# Patient Record
Sex: MALE | Race: WHITE | NOT HISPANIC OR LATINO | Employment: OTHER | URBAN - NONMETROPOLITAN AREA
[De-identification: names, ages, dates, MRNs, and addresses within clinical notes are randomized per-mention and may not be internally consistent; named-entity substitution may affect disease eponyms.]

---

## 2018-04-21 ENCOUNTER — HOSPITAL ENCOUNTER (EMERGENCY)
Facility: HOSPITAL | Age: 48
Discharge: HOME | End: 2018-04-21
Attending: EMERGENCY MEDICINE
Payer: COMMERCIAL

## 2018-04-21 VITALS
BODY MASS INDEX: 22.07 KG/M2 | DIASTOLIC BLOOD PRESSURE: 88 MMHG | RESPIRATION RATE: 18 BRPM | WEIGHT: 162.7 LBS | OXYGEN SATURATION: 100 % | TEMPERATURE: 97.5 F | SYSTOLIC BLOOD PRESSURE: 150 MMHG | HEART RATE: 81 BPM

## 2018-04-21 DIAGNOSIS — S61.219A FINGER LACERATION INVOLVING TENDON: Primary | ICD-10-CM

## 2018-04-21 PROCEDURE — 90471 IMMUNIZATION ADMIN: CPT

## 2018-04-21 PROCEDURE — 99283 EMERGENCY DEPT VISIT LOW MDM: CPT

## 2018-04-21 PROCEDURE — 90715 TDAP VACCINE 7 YRS/> IM: CPT | Performed by: EMERGENCY MEDICINE

## 2018-04-21 RX ORDER — CEPHALEXIN 250 MG/1
500 CAPSULE ORAL ONCE
Status: COMPLETED | OUTPATIENT
Start: 2018-04-21 | End: 2018-04-21

## 2018-04-21 RX ORDER — CEPHALEXIN 500 MG/1
500 CAPSULE ORAL EVERY 6 HOURS SCHEDULED
Qty: 40 CAPSULE | Refills: 0 | Status: SHIPPED | OUTPATIENT
Start: 2018-04-21 | End: 2018-05-01

## 2018-04-21 RX ADMIN — TETANUS TOXOID, REDUCED DIPHTHERIA TOXOID AND ACELLULAR PERTUSSIS VACCINE, ADSORBED 0.5 ML: 5; 2.5; 8; 8; 2.5 SUSPENSION INTRAMUSCULAR at 07:19

## 2018-04-21 RX ADMIN — CEPHALEXIN 500 MG: 250 CAPSULE ORAL at 07:19

## 2018-04-21 NOTE — DISCHARGE INSTRUCTIONS
Finger Laceration   WHAT YOU NEED TO KNOW:   A finger laceration is a deep cut in your skin  It is often caused by a sharp object, such as a knife, or blunt force to your finger  Your blood vessels, bones, joints, tendons, or nerves may also be injured  DISCHARGE INSTRUCTIONS:   Return to the emergency department if:   · Your wound comes apart  · Blood soaks through your bandage  · You have severe pain in your finger or hand  · Your finger is pale and cold  · You have sudden trouble moving your finger  · Your swelling suddenly gets worse  · You have red streaks on your skin coming from your wound  Contact your healthcare provider or hand specialist if:   · You have new numbness or tingling  · Your finger feels warm, looks swollen or red, and is draining pus  · You have a fever  · You have questions or concerns about your condition or care  Medicines: You may  need any of the following:  · Antibiotics  help prevent a bacterial infection  · Acetaminophen  decreases pain and fever  It is available without a doctor's order  Ask how much to take and how often to take it  Follow directions  Read the labels of all other medicines you are using to see if they also contain acetaminophen, or ask your doctor or pharmacist  Acetaminophen can cause liver damage if not taken correctly  Do not use more than 4 grams (4,000 milligrams) total of acetaminophen in one day  · Prescription pain medicine  may be given  Ask your healthcare provider how to take this medicine safely  Some prescription pain medicines contain acetaminophen  Do not take other medicines that contain acetaminophen without talking to your healthcare provider  Too much acetaminophen may cause liver damage  Prescription pain medicine may cause constipation  Ask your healthcare provider how to prevent or treat constipation  · Take your medicine as directed    Contact your healthcare provider if you think your medicine is not helping or if you have side effects  Tell him or her if you are allergic to any medicine  Keep a list of the medicines, vitamins, and herbs you take  Include the amounts, and when and why you take them  Bring the list or the pill bottles to follow-up visits  Carry your medicine list with you in case of an emergency  Self-care:   · Apply ice  on your finger for 15 to 20 minutes every hour or as directed  Use an ice pack, or put crushed ice in a plastic bag  Cover it with a towel before you apply it to your skin  Ice helps prevent tissue damage and decreases swelling and pain  · Elevate  your hand above the level of your heart as often as you can  This will help decrease swelling and pain  Prop your hand on pillows or blankets to keep it elevated comfortably  · Wear your splint as directed  A splint will decrease movement and stress on your wound  The splint may help your wound heal faster  Ask your healthcare provider how to apply and remove a splint  · Apply ointments to decrease scarring  Do not apply ointments until your healthcare provider says it is okay  You may need to wait until your wound is healed  Ask which ointment to buy and how often to use it  Wound care:   · Do not get your wound wet until your healthcare provider says it is okay  Do not soak your hand in water  Do not go swimming until your healthcare provider says it is okay  When your healthcare provider says it is okay, carefully wash around the wound with soap and water  Let soap and water run over your wound  Gently pat the area dry or allow it to air dry  · Change your bandages when they get wet, dirty, or after washing  Apply new, clean bandages as directed  Do not apply elastic bandages or tape too tightly  Do not put powders or lotions on your wound  · Apply antibiotic ointment as directed  Your healthcare provider may give you antibiotic ointment to put over your wound if you have stitches   If you have Strips-Strips over your wound, let them dry up and fall off on their own  If they do not fall off within 14 days, gently remove them  If you have glue over your wound, do not remove or pick at it  If your glue comes off, do not replace it with glue that you have at home  · Check your wound every day for signs of infection  Signs of infection include swelling, redness, or pus  Follow up with your healthcare provider or hand specialist in 2 days:  Write down your questions so you remember to ask them during your visits  © 2017 [facility] Information is for End User's use only and may not be sold, redistributed or otherwise used for commercial purposes  All illustrations and images included in CareNotes® are the copyrighted property of [employer]  or Anam [employer]  The above information is an  only  It is not intended as medical advice for individual conditions or treatments  Talk to your doctor, nurse or pharmacist before following any medical regimen to see if it is safe and effective for you  Keep wound clean and dry  Wear splint to rest joint at finger tip to allow tendon to heal  5 stitches out in 10 days    Topical ointment twice daily  Return with any increased redness pain swelling or drainage

## 2018-04-21 NOTE — ED PROVIDER NOTES
History  Chief Complaint   Patient presents with    Finger Injury     rt ring finger injury injury , lacerated with a  around 2 a m      49-year-old right-hand-dominant male was sharpening some tools when his right ring finger hit the edge of the  wheel he sustained a laceration to the dorsum of his right ring finger he does have a prior surgery for a forearm fractre  but has noted no change in strength of the hand movement  He has had no numbness or paresthesias  He is not sure of his last tetanus  Bleeding was controlled with direct pressure  Prior to Admission Medications   Prescriptions Last Dose Informant Patient Reported? Taking? LORazepam (ATIVAN) 0 5 mg tablet   Yes No   Sig: Take 0 5 mg by mouth 3 (three) times a day  LOSARTAN POTASSIUM PO   Yes No   Sig: Take 1 tablet by mouth daily  TOOK 1 1/2 TABLETS TODAY   buPROPion (WELLBUTRIN) 100 mg tablet   Yes No   Sig: Take 100 mg by mouth daily  fentaNYL (DURAGESIC) 25 mcg/hr   Yes No   Sig: Place 1 patch on the skin every 3 (three) days  oxyCODONE (ROXICODONE) 30 MG immediate release tablet   Yes No   Sig: Take 30 mg by mouth every 6 (six) hours as needed for moderate pain  traZODone (DESYREL) 100 mg tablet   Yes No   Sig: Take 100 mg by mouth daily at bedtime  Facility-Administered Medications: None       Past Medical History:   Diagnosis Date    Chronic pain     BACK    Diabetes mellitus ([facility] [address] )     Hypertension     Psychiatric disorder     ANXIETY       Past Surgical History:   Procedure Laterality Date    BARIATRIC SURGERY      ORIF FOREARM FRACTURE Left        Family History   Problem Relation Age of Onset    Stroke Mother     Diabetes Mother     Heart disease Mother     Hypertension Mother     Diabetes Father     Heart disease Father     Hypertension Father      I have reviewed and agree with the history as documented      Social History   Substance Use Topics    Smoking status: Former Smoker    Smokeless tobacco: Not on file    Alcohol use Yes      Comment: OCCASSIONAL        Review of Systems   Skin: Positive for wound (dorsum rt ring finger)  Neurological: Negative for weakness and numbness  All other systems reviewed and are negative  Physical Exam  ED Triage Vitals [04/21/18 0619]   Temperature Pulse Respirations Blood Pressure SpO2   97 5 °F (36 4 °C) 81 18 150/88 100 %      Temp Source Heart Rate Source Patient Position - Orthostatic VS BP Location FiO2 (%)   Temporal -- -- -- --      Pain Score       No Pain           Orthostatic Vital Signs  Vitals:    04/21/18 0619   BP: 150/88   Pulse: 81       Physical Exam   Constitutional: He is oriented to person, place, and time  He appears well-developed  No distress  Musculoskeletal:        Hands:  Rt hand: 2+ radial pulse, able to oppose thumb to right ring finger; lumbricals are intact patient is able to extend the digit against resistance FDP and FDS were isolated found to be intact against resistance  there is no evidence of subungual hematoma less than 4 millimeter two-point discrimination is intact to the radial ulnar aspect of the distal phalanx  Exploring the wound there is partial tear of extensor tendon at level of DIP;    Neurological: He is alert and oriented to person, place, and time  No cranial nerve deficit or sensory deficit  He exhibits normal muscle tone  Coordination normal    Gait steady   Skin: Skin is warm and dry  He is not diaphoretic  Psychiatric: He has a normal mood and affect  Vitals reviewed        ED Medications  Medications   cephalexin (KEFLEX) capsule 500 mg (500 mg Oral Given 4/21/18 0719)   tetanus-diphtheria-acellular pertussis (BOOSTRIX) IM injection 0 5 mL (0 5 mL Intramuscular Given 4/21/18 0719)       Diagnostic Studies  Results Reviewed     None                 No orders to display              Procedures  Lac Repair  Date/Time: 4/21/2018 6:40 AM  Performed by: Narcisa Deal  Authorized by: Rolo Hogan   Consent: Verbal consent obtained  Risks and benefits: risks, benefits and alternatives were discussed  Consent given by: patient  Patient understanding: patient states understanding of the procedure being performed  Patient identity confirmed: verbally with patient  Body area: upper extremity  Location details: right ring finger  Laceration length: 4 cm  Contamination: The wound is contaminated  (graphite material)  Tendon involvement: superficial (partial tear of extensor tendon )  Nerve involvement: none  Vascular damage: no  Anesthesia: digital block    Anesthesia:  Local Anesthetic: bupivacaine 0 5% without epinephrine  Anesthetic total: 5 mL    Wound Dehiscence:  Superficial Wound Dehiscence: simple closure      Procedure Details:  Preparation: Patient was prepped and draped in the usual sterile fashion  Irrigation solution: saline  Irrigation method: syringe  Amount of cleaning: extensive (scrubbed with chlorohexidine brush and irrigated with 500ml NS)  Debridement: none  Degree of undermining: none  Skin closure: 5-0 nylon  Number of sutures: 5  Technique: simple and horizontal mattress  Approximation: close  Approximation difficulty: simple  Dressing: xeroform dressing  Patient tolerance: Patient tolerated the procedure well with no immediate complications  Comments: u shaped aluminum splint fashioned to place DIP in extension   CMS intact after placement             Phone Contacts  ED Phone Contact    ED Course  ED Course                                MDM  Number of Diagnoses or Management Options  Finger laceration involving tendon:   Diagnosis management comments: Mdm: reviewed possibility of fracture, bony involvement with , patient declines and just wants to be stitched up    CritCare Time    Disposition  Final diagnoses:   Finger laceration involving tendon - rt ring dorsum involving partial tear of extensor tendon DIP, 4cm simple repair     Time reflects when diagnosis was documented in both MDM as applicable and the Disposition within this note     Time User Action Codes Description Comment    4/21/2018  7:05 AM Zoieross Agrawal Add [X51 058I,  M32 618V] Finger laceration involving tendon     4/21/2018  7:06 AM Zoie Tanja Modify [R26 700R,  B37 724K] Finger laceration involving tendon rt ring dorsum involving partial tear of extensor tendon DIP, 4cm simple repair      ED Disposition     ED Disposition Condition Comment    Discharge  Cameron Richards III discharge to home/self care  Condition at discharge: Good        Follow-up Information     Follow up With Specialties Details Why Contact Info    Kita Bach MD Orthopedic Surgery Call in 2 days recheck of wound next week [address] [facility] [address]  [phone]          Discharge Medication List as of 4/21/2018  7:09 AM      START taking these medications    Details   cephalexin (KEFLEX) 500 mg capsule Take 1 capsule (500 mg total) by mouth every 6 (six) hours for 10 days, Starting Sat 4/21/2018, Until Tue 5/1/2018, Print         CONTINUE these medications which have NOT CHANGED    Details   buPROPion (WELLBUTRIN) 100 mg tablet Take 100 mg by mouth daily  , Until Discontinued, Historical Med      fentaNYL (DURAGESIC) 25 mcg/hr Place 1 patch on the skin every 3 (three) days  , Until Discontinued, Historical Med      LORazepam (ATIVAN) 0 5 mg tablet Take 0 5 mg by mouth 3 (three) times a day , Until Discontinued, Historical Med      LOSARTAN POTASSIUM PO Take 1 tablet by mouth daily  TOOK 1 1/2 TABLETS TODAY, Until Discontinued, Historical Med      oxyCODONE (ROXICODONE) 30 MG immediate release tablet Take 30 mg by mouth every 6 (six) hours as needed for moderate pain , Until Discontinued, Historical Med      traZODone (DESYREL) 100 mg tablet Take 100 mg by mouth daily at bedtime  , Until Discontinued, Historical Med           No discharge procedures on file      ED Provider  Electronically Signed by           David Hawthorne Albino Estimable, MD  04/21/18 0956

## 2018-05-08 ENCOUNTER — TELEPHONE (OUTPATIENT)
Dept: INTERNAL MEDICINE CLINIC | Facility: CLINIC | Age: 48
End: 2018-05-08

## 2018-05-08 DIAGNOSIS — L81.9 DISCOLORATION OF SKIN OF HAND: Primary | ICD-10-CM

## 2018-05-08 DIAGNOSIS — E78.5 HYPERLIPIDEMIA, UNSPECIFIED HYPERLIPIDEMIA TYPE: ICD-10-CM

## 2018-05-08 NOTE — TELEPHONE ENCOUNTER
His visit should be half hour since not here in extended time - not sure what time he is scheduled but can be 7 30 or moved to end of session if not half hour now    Cbc,iron  b12,c reactive protein  Lipid panel,cmp  Sed rate  cpk  tsh

## 2018-05-15 RX ORDER — BIOTIN 1 MG
TABLET ORAL
COMMUNITY
End: 2019-03-01 | Stop reason: ALTCHOICE

## 2018-05-15 RX ORDER — TADALAFIL 20 MG/1
1 TABLET ORAL
COMMUNITY
Start: 2016-07-01 | End: 2018-05-19 | Stop reason: SDDI

## 2018-05-16 ENCOUNTER — TRANSCRIBE ORDERS (OUTPATIENT)
Dept: ADMINISTRATIVE | Facility: HOSPITAL | Age: 48
End: 2018-05-16

## 2018-05-16 ENCOUNTER — APPOINTMENT (OUTPATIENT)
Dept: LAB | Facility: MEDICAL CENTER | Age: 48
End: 2018-05-16
Payer: COMMERCIAL

## 2018-05-16 DIAGNOSIS — E78.5 HYPERLIPIDEMIA, UNSPECIFIED HYPERLIPIDEMIA TYPE: ICD-10-CM

## 2018-05-16 DIAGNOSIS — L81.9 DISCOLORATION OF SKIN OF HAND: ICD-10-CM

## 2018-05-16 LAB
ALBUMIN SERPL BCP-MCNC: 3.4 G/DL (ref 3.5–5)
ALP SERPL-CCNC: 131 U/L (ref 46–116)
ALT SERPL W P-5'-P-CCNC: 36 U/L (ref 12–78)
ANION GAP SERPL CALCULATED.3IONS-SCNC: 5 MMOL/L (ref 4–13)
AST SERPL W P-5'-P-CCNC: 33 U/L (ref 5–45)
BASOPHILS # BLD AUTO: 0.13 THOUSANDS/ΜL (ref 0–0.1)
BASOPHILS NFR BLD AUTO: 2 % (ref 0–1)
BILIRUB SERPL-MCNC: 0.67 MG/DL (ref 0.2–1)
BUN SERPL-MCNC: 19 MG/DL (ref 5–25)
CALCIUM SERPL-MCNC: 8.6 MG/DL (ref 8.3–10.1)
CHLORIDE SERPL-SCNC: 106 MMOL/L (ref 100–108)
CHOLEST SERPL-MCNC: 176 MG/DL (ref 50–200)
CK SERPL-CCNC: 116 U/L (ref 39–308)
CO2 SERPL-SCNC: 30 MMOL/L (ref 21–32)
CREAT SERPL-MCNC: 0.63 MG/DL (ref 0.6–1.3)
CRP SERPL QL: 5.6 MG/L
EOSINOPHIL # BLD AUTO: 0.19 THOUSAND/ΜL (ref 0–0.61)
EOSINOPHIL NFR BLD AUTO: 2 % (ref 0–6)
ERYTHROCYTE [DISTWIDTH] IN BLOOD BY AUTOMATED COUNT: 18.8 % (ref 11.6–15.1)
ERYTHROCYTE [SEDIMENTATION RATE] IN BLOOD: 28 MM/HOUR (ref 0–10)
GFR SERPL CREATININE-BSD FRML MDRD: 116 ML/MIN/1.73SQ M
GLUCOSE P FAST SERPL-MCNC: 92 MG/DL (ref 65–99)
HCT VFR BLD AUTO: 34 % (ref 36.5–49.3)
HDLC SERPL-MCNC: 52 MG/DL (ref 40–60)
HGB BLD-MCNC: 9.8 G/DL (ref 12–17)
IMM GRANULOCYTES # BLD AUTO: 0.02 THOUSAND/UL (ref 0–0.2)
IMM GRANULOCYTES NFR BLD AUTO: 0 % (ref 0–2)
IRON SERPL-MCNC: 26 UG/DL (ref 65–175)
LDLC SERPL CALC-MCNC: 111 MG/DL (ref 0–100)
LYMPHOCYTES # BLD AUTO: 1.55 THOUSANDS/ΜL (ref 0.6–4.47)
LYMPHOCYTES NFR BLD AUTO: 19 % (ref 14–44)
MCH RBC QN AUTO: 20.5 PG (ref 26.8–34.3)
MCHC RBC AUTO-ENTMCNC: 28.8 G/DL (ref 31.4–37.4)
MCV RBC AUTO: 71 FL (ref 82–98)
MONOCYTES # BLD AUTO: 0.96 THOUSAND/ΜL (ref 0.17–1.22)
MONOCYTES NFR BLD AUTO: 12 % (ref 4–12)
NEUTROPHILS # BLD AUTO: 5.21 THOUSANDS/ΜL (ref 1.85–7.62)
NEUTS SEG NFR BLD AUTO: 65 % (ref 43–75)
NONHDLC SERPL-MCNC: 124 MG/DL
NRBC BLD AUTO-RTO: 0 /100 WBCS
PLATELET # BLD AUTO: 546 THOUSANDS/UL (ref 149–390)
PMV BLD AUTO: 8.2 FL (ref 8.9–12.7)
POTASSIUM SERPL-SCNC: 4.6 MMOL/L (ref 3.5–5.3)
PROT SERPL-MCNC: 6.9 G/DL (ref 6.4–8.2)
RBC # BLD AUTO: 4.78 MILLION/UL (ref 3.88–5.62)
SODIUM SERPL-SCNC: 141 MMOL/L (ref 136–145)
TRIGL SERPL-MCNC: 63 MG/DL
TSH SERPL DL<=0.05 MIU/L-ACNC: 1.02 UIU/ML (ref 0.36–3.74)
VIT B12 SERPL-MCNC: 587 PG/ML (ref 100–900)
WBC # BLD AUTO: 8.06 THOUSAND/UL (ref 4.31–10.16)

## 2018-05-16 PROCEDURE — 36415 COLL VENOUS BLD VENIPUNCTURE: CPT

## 2018-05-16 PROCEDURE — 82550 ASSAY OF CK (CPK): CPT

## 2018-05-16 PROCEDURE — 83540 ASSAY OF IRON: CPT

## 2018-05-16 PROCEDURE — 80061 LIPID PANEL: CPT

## 2018-05-16 PROCEDURE — 84443 ASSAY THYROID STIM HORMONE: CPT

## 2018-05-16 PROCEDURE — 85025 COMPLETE CBC W/AUTO DIFF WBC: CPT

## 2018-05-16 PROCEDURE — 85652 RBC SED RATE AUTOMATED: CPT

## 2018-05-16 PROCEDURE — 82607 VITAMIN B-12: CPT

## 2018-05-16 PROCEDURE — 86140 C-REACTIVE PROTEIN: CPT

## 2018-05-16 PROCEDURE — 80053 COMPREHEN METABOLIC PANEL: CPT

## 2018-05-19 ENCOUNTER — OFFICE VISIT (OUTPATIENT)
Dept: INTERNAL MEDICINE CLINIC | Facility: CLINIC | Age: 48
End: 2018-05-19
Payer: COMMERCIAL

## 2018-05-19 VITALS
BODY MASS INDEX: 23.08 KG/M2 | DIASTOLIC BLOOD PRESSURE: 78 MMHG | TEMPERATURE: 98.8 F | HEIGHT: 72 IN | WEIGHT: 170.4 LBS | SYSTOLIC BLOOD PRESSURE: 122 MMHG

## 2018-05-19 DIAGNOSIS — E29.1 HYPOGONADISM MALE: ICD-10-CM

## 2018-05-19 DIAGNOSIS — F41.9 ANXIETY: ICD-10-CM

## 2018-05-19 DIAGNOSIS — K21.9 GASTROESOPHAGEAL REFLUX DISEASE, ESOPHAGITIS PRESENCE NOT SPECIFIED: ICD-10-CM

## 2018-05-19 DIAGNOSIS — F51.01 PRIMARY INSOMNIA: ICD-10-CM

## 2018-05-19 DIAGNOSIS — D50.8 OTHER IRON DEFICIENCY ANEMIA: Primary | ICD-10-CM

## 2018-05-19 PROCEDURE — 3008F BODY MASS INDEX DOCD: CPT | Performed by: INTERNAL MEDICINE

## 2018-05-19 PROCEDURE — 99215 OFFICE O/P EST HI 40 MIN: CPT | Performed by: INTERNAL MEDICINE

## 2018-05-19 PROCEDURE — 3725F SCREEN DEPRESSION PERFORMED: CPT | Performed by: INTERNAL MEDICINE

## 2018-05-19 RX ORDER — DULOXETIN HYDROCHLORIDE 30 MG/1
30 CAPSULE, DELAYED RELEASE ORAL DAILY
Qty: 30 CAPSULE | Refills: 2 | Status: SHIPPED | OUTPATIENT
Start: 2018-05-19 | End: 2018-06-18 | Stop reason: SDUPTHER

## 2018-05-19 RX ORDER — OMEPRAZOLE 40 MG/1
40 CAPSULE, DELAYED RELEASE ORAL DAILY
Qty: 30 CAPSULE | Refills: 3 | Status: SHIPPED | OUTPATIENT
Start: 2018-05-19 | End: 2018-06-18 | Stop reason: SDUPTHER

## 2018-05-19 RX ORDER — TRAZODONE HYDROCHLORIDE 50 MG/1
50 TABLET ORAL
Qty: 30 TABLET | Refills: 0 | Status: SHIPPED | OUTPATIENT
Start: 2018-05-19 | End: 2018-06-18 | Stop reason: SDUPTHER

## 2018-05-19 NOTE — PROGRESS NOTES
Assessment/Plan:         Diagnoses and all orders for this visit:    Hypogonadism male  -     Testosterone; Future  Pt weill get testosterone rechecked and consider urology reeval    Gastroesophageal reflux disease, esophagitis presence not specified  -     omeprazole (PriLOSEC) 40 MG capsule; Take 1 capsule (40 mg total) by mouth daily  -     Ambulatory referral to Bariatric Surgery; Future  Sxs supsicious for gerd and JORJE as etiology    Other iron deficiency anemia  -     Ambulatory referral to Bariatric Surgery; Future  Needs reeval and probable EGD ? iron infusion due to lack of absorption  Increase iron rich foods, supplement and add PPI    Primary insomnia  -     traZODone (DESYREL) 50 mg tablet; Take 1 tablet (50 mg total) by mouth daily at bedtime for 30 days  Pt agrees to trial lower dose as he did benefit from this in the past  He is also limiting etoh and may stop completely as well as caffeinated beverages and increase water intake    Anxiety  -     DULoxetine (CYMBALTA) 30 mg delayed release capsule; Take 1 capsule (30 mg total) by mouth daily for 30 days  Pt agreeable to start lower dose cymbalta and did mention he should look into counselling options thru his provider    Other orders  -     aspirin 81 MG tablet; Take 1 tablet by mouth daily  -     Discontinue: tadalafil (CIALIS) 20 MG tablet; Take 1 tablet by mouth  -     Multiple Vitamins-Minerals (MULTI COMPLETE) CAPS; Take by mouth  -     Cholecalciferol (VITAMIN D3) 1000 units CAPS; Take by mouth  -     CALCIUM-MAGNESIUM-ZINC PO; Take by mouth  -     b complex vitamins tablet; Take 1 tablet by mouth daily    Pt will call his insurance for Rheumatology options since Dr Adi Ibarra not in his netowrk and she had wanted him to start embrel a few years ago   He is now agreeable to consider this  - he states was dx with Ankylosing spndylitis    Pt counselled onm medications as well as diet to increase iron and protein supplementation  Also counselled about etoh and caffeine cessation and the sxs they may be contributing including stomach issues and depression    Rto 1 month which patient is agreeable to   Patient ID: Jaye Goodman III is a 50 y o  male  HPI   Pt returns after lapse in care mainly due to lack of coverage  He stopped all meds in and around January for unlcear reason  He was to start embrel per rheum a couple years ago but he never followed thru  Now has increasing joint sxs and back pain and is interested but prior rheumatogist is not in network for him  He no longer takes pain meds  He had been drinking more but now is lessening, He has increased fatigue but was sleeping a lot more a couple months ago , He tires easily  He has heartburn and started otc prilosec with some relief  He has not had bariatric follow up since postop  He did have colonoscopy 3 years ramirez and due in 2020  His appetite is a bit better but not great, Not sleeping well and does admit to being depressed,He denies harmful thoughts but said in January he was not david  Good place and was angry at times with family  Stress with his son and family he said was a factor  The past month he feels he is a little better and does want to feel better in general and get help    The following portions of the patient's history were reviewed and updated as appropriate:    Allergies   Allergen Reactions    Penicillins Hives     Past Medical History:   Diagnosis Date    Chronic pain     BACK    Diabetes mellitus ([facility] [address]     Diverticulitis of colon     Hypertension     resolved 2/17/16    Obesity     Pneumonia     Psychiatric disorder     ANXIETY    Rectal lesion     last assessed 1/12/15     Past Surgical History:   Procedure Laterality Date    BARIATRIC SURGERY  08/2011    ORIF FOREARM FRACTURE Left     excision     Family History   Problem Relation Age of Onset    Stroke Mother     Diabetes Mother     Heart disease Mother     Hypertension Mother     Diabetes Father      mellitus    Heart disease Father     Hypertension Father     Coronary artery disease Father     Lung cancer Family      Social History     Social History    Marital status: /Civil Union     Spouse name: N/A    Number of children: N/A    Years of education: N/A     Occupational History    Not on file  Social History Main Topics    Smoking status: Former Smoker    Smokeless tobacco: Never Used    Alcohol use Yes      Comment: OCCASSIONAL    Drug use: No    Sexual activity: Not on file     Other Topics Concern    Not on file     Social History Narrative    Caffeine use    Uses safety equip - seat belt         Review of Systems   Constitutional: Positive for unexpected weight change  HENT: Negative  Eyes: Negative  Respiratory: Negative  Cardiovascular: Positive for chest pain  Gastrointestinal: Positive for abdominal pain  Endocrine: Negative  Genitourinary: Negative  Musculoskeletal: Positive for arthralgias, back pain and joint swelling  Skin: Positive for color change  Fingertips turn white   Allergic/Immunologic: Negative  Neurological: Positive for dizziness and weakness  Hematological: Negative  Psychiatric/Behavioral: Positive for sleep disturbance  The patient is nervous/anxious  /78   Temp 98 8 °F (37 1 °C) (Tympanic)   Ht 6' (1 829 m)   Wt 77 3 kg (170 lb 6 4 oz)   BMI 23 11 kg/m²          Physical Exam   Constitutional: He is oriented to person, place, and time  No distress  Frail appearing   HENT:   Head: Normocephalic and atraumatic  Right Ear: External ear normal    Left Ear: External ear normal    Nose: Nose normal    Mouth/Throat: Oropharynx is clear and moist  No oropharyngeal exudate  Eyes: EOM are normal  Pupils are equal, round, and reactive to light  No scleral icterus  pallor   Neck: Normal range of motion  Neck supple  No JVD present  No tracheal deviation present  No thyromegaly present     Cardiovascular: Normal rate, regular rhythm, normal heart sounds and intact distal pulses  No murmur heard  Pulmonary/Chest: Effort normal and breath sounds normal  No respiratory distress  He has no wheezes  He has no rales  He exhibits no tenderness  Abdominal: Soft  Bowel sounds are normal  He exhibits no distension and no mass  There is tenderness  There is no rebound and no guarding  Mild midepigastric ttp   Musculoskeletal: Normal range of motion  He exhibits deformity  He exhibits no edema or tenderness  Decrease muscle tone   Lymphadenopathy:     He has no cervical adenopathy  Neurological: He is alert and oriented to person, place, and time  He has normal reflexes  He displays normal reflexes  No cranial nerve deficit  He exhibits abnormal muscle tone  Coordination normal    Skin: Skin is warm and dry  He is not diaphoretic  Psychiatric: His behavior is normal  Judgment and thought content normal    Flat affect  No harmful thoughts  Improved eye contact as visit went on and when speaking about his daughter/granddaughter   Nursing note and vitals reviewed      cn 2-12 intact  No focal deficit

## 2018-05-22 ENCOUNTER — TELEPHONE (OUTPATIENT)
Dept: INTERNAL MEDICINE CLINIC | Facility: CLINIC | Age: 48
End: 2018-05-22

## 2018-05-22 DIAGNOSIS — R09.89 CHEST CONGESTION: Primary | ICD-10-CM

## 2018-05-22 RX ORDER — AZITHROMYCIN 250 MG/1
TABLET, FILM COATED ORAL
Qty: 6 TABLET | Refills: 0 | Status: SHIPPED | OUTPATIENT
Start: 2018-05-22 | End: 2018-05-27

## 2018-06-01 ENCOUNTER — OFFICE VISIT (OUTPATIENT)
Dept: BARIATRICS | Facility: CLINIC | Age: 48
End: 2018-06-01

## 2018-06-01 ENCOUNTER — TELEPHONE (OUTPATIENT)
Dept: BARIATRICS | Facility: CLINIC | Age: 48
End: 2018-06-01

## 2018-06-01 VITALS
HEIGHT: 72 IN | TEMPERATURE: 98.3 F | SYSTOLIC BLOOD PRESSURE: 140 MMHG | BODY MASS INDEX: 23.03 KG/M2 | WEIGHT: 170 LBS | DIASTOLIC BLOOD PRESSURE: 80 MMHG | HEART RATE: 72 BPM

## 2018-06-01 DIAGNOSIS — K91.2 POSTSURGICAL MALABSORPTION: ICD-10-CM

## 2018-06-01 DIAGNOSIS — Z98.84 BARIATRIC SURGERY STATUS: Primary | ICD-10-CM

## 2018-06-01 PROCEDURE — RECHECK: Performed by: SURGERY

## 2018-06-01 NOTE — PROGRESS NOTES
FOLLOW UP VISIT - BARIATRIC SURGERY  Milla Hunt III 50 y o  male MRN: [id number]  Unit/Bed#:  Encounter: [id number]      HPI:  Milla Hunt III is a 50 y o  male who presents with a history of Piper-en-Y gastric bypass  Here today for scheduled follow-up  Review of Systems    Historical Information   Past Medical History:   Diagnosis Date    Chronic pain     BACK    Diabetes mellitus ([address] )     Diverticulitis of colon     Hypertension     resolved 2/17/16    Obesity     Pneumonia     Psychiatric disorder     ANXIETY    Rectal lesion     last assessed 1/12/15     Past Surgical History:   Procedure Laterality Date    BARIATRIC SURGERY  08/2011    ORIF FOREARM FRACTURE Left     excision     Social History   History   Alcohol Use    Yes     Comment: OCCASSIONAL     History   Drug Use No     History   Smoking Status    Former Smoker   Smokeless Tobacco    Never Used     Family History: non-contributory    Meds/Allergies   all medications and allergies reviewed  Allergies   Allergen Reactions    Penicillins Hives       Objective   First Vitals:   @VSFIRST2(5,8,6,7,9,11,14,10:FIRST)@    Current Vitals:   Blood Pressure: 140/80 (06/01/18 1121)  Pulse: 72 (06/01/18 1121)  Temperature: 98 3 °F (36 8 °C) (06/01/18 1121)  Height: 6' (182 9 cm) (06/01/18 1121)  Weight - Scale: 77 1 kg (170 lb) (06/01/18 1121)        Invasive Devices          No matching active lines, drains, or airways          Physical Exam    Lab Results: I have personally reviewed pertinent lab results  Imaging: I have personally reviewed pertinent reports  EKG, Pathology, and Other Studies: I have personally reviewed pertinent reports  Assessment/PLAN:    50 y o  yo male with a history of laparoscopic Piper-en-Y gastric bypass in August of 2011  He has lost 118% excess weight loss and 36% total body weight  He is almost 7 years out and still maintaining    I had an opportunity of reviewing his nutritional labs and he iron deficient anemic  Upon reviewing his information I went to see him but the patient left the office without giving an explanation  We tried calling him to his cell phone with no answering  My office staff will reach out to him to reschedule the office us I was not able to see him today        Valerio Her MD  6/1/2018  11:49 AM

## 2018-06-11 ENCOUNTER — LAB (OUTPATIENT)
Dept: LAB | Facility: MEDICAL CENTER | Age: 48
End: 2018-06-11
Payer: COMMERCIAL

## 2018-06-11 ENCOUNTER — TRANSCRIBE ORDERS (OUTPATIENT)
Dept: RADIOLOGY | Facility: MEDICAL CENTER | Age: 48
End: 2018-06-11

## 2018-06-11 ENCOUNTER — TELEPHONE (OUTPATIENT)
Dept: INTERNAL MEDICINE CLINIC | Facility: CLINIC | Age: 48
End: 2018-06-11

## 2018-06-11 DIAGNOSIS — D50.9 IRON DEFICIENCY ANEMIA, UNSPECIFIED IRON DEFICIENCY ANEMIA TYPE: ICD-10-CM

## 2018-06-11 DIAGNOSIS — E29.1 HYPOGONADISM MALE: ICD-10-CM

## 2018-06-11 DIAGNOSIS — D50.9 IRON DEFICIENCY ANEMIA, UNSPECIFIED IRON DEFICIENCY ANEMIA TYPE: Primary | ICD-10-CM

## 2018-06-11 LAB
IRON SERPL-MCNC: 31 UG/DL (ref 65–175)
TESTOST SERPL-MCNC: 499 NG/DL (ref 113–1065)

## 2018-06-11 PROCEDURE — 84403 ASSAY OF TOTAL TESTOSTERONE: CPT

## 2018-06-11 PROCEDURE — 36415 COLL VENOUS BLD VENIPUNCTURE: CPT

## 2018-06-11 PROCEDURE — 83540 ASSAY OF IRON: CPT

## 2018-06-18 DIAGNOSIS — K21.9 GASTROESOPHAGEAL REFLUX DISEASE, ESOPHAGITIS PRESENCE NOT SPECIFIED: ICD-10-CM

## 2018-06-18 DIAGNOSIS — F41.9 ANXIETY: ICD-10-CM

## 2018-06-18 DIAGNOSIS — F51.01 PRIMARY INSOMNIA: ICD-10-CM

## 2018-06-18 RX ORDER — OMEPRAZOLE 40 MG/1
40 CAPSULE, DELAYED RELEASE ORAL DAILY
Qty: 90 CAPSULE | Refills: 3 | Status: SHIPPED | OUTPATIENT
Start: 2018-06-18 | End: 2018-12-18 | Stop reason: SDUPTHER

## 2018-06-18 RX ORDER — TRAZODONE HYDROCHLORIDE 50 MG/1
50 TABLET ORAL
Qty: 90 TABLET | Refills: 3 | Status: SHIPPED | OUTPATIENT
Start: 2018-06-18 | End: 2018-12-18 | Stop reason: SDUPTHER

## 2018-06-18 RX ORDER — DULOXETIN HYDROCHLORIDE 30 MG/1
30 CAPSULE, DELAYED RELEASE ORAL DAILY
Qty: 90 CAPSULE | Refills: 3 | Status: SHIPPED | OUTPATIENT
Start: 2018-06-18 | End: 2018-12-18 | Stop reason: SDUPTHER

## 2018-09-26 ENCOUNTER — TELEPHONE (OUTPATIENT)
Dept: INTERNAL MEDICINE CLINIC | Facility: CLINIC | Age: 48
End: 2018-09-26

## 2018-09-26 NOTE — TELEPHONE ENCOUNTER
I spoke with his wife and she will be calling for appt  For Gregyamilet Olivares - I told her it would not be until next week  She will probably call this afternoon or tomorrow to setup

## 2018-12-18 DIAGNOSIS — F51.01 PRIMARY INSOMNIA: ICD-10-CM

## 2018-12-18 DIAGNOSIS — K21.9 GASTROESOPHAGEAL REFLUX DISEASE, ESOPHAGITIS PRESENCE NOT SPECIFIED: ICD-10-CM

## 2018-12-18 DIAGNOSIS — F41.9 ANXIETY: ICD-10-CM

## 2018-12-18 RX ORDER — OMEPRAZOLE 40 MG/1
40 CAPSULE, DELAYED RELEASE ORAL DAILY
Qty: 90 CAPSULE | Refills: 3 | Status: SHIPPED | OUTPATIENT
Start: 2018-12-18 | End: 2019-11-12 | Stop reason: SDUPTHER

## 2018-12-18 RX ORDER — TRAZODONE HYDROCHLORIDE 50 MG/1
50 TABLET ORAL
Qty: 90 TABLET | Refills: 3 | Status: SHIPPED | OUTPATIENT
Start: 2018-12-18 | End: 2019-09-30 | Stop reason: SDUPTHER

## 2018-12-18 RX ORDER — DULOXETIN HYDROCHLORIDE 30 MG/1
30 CAPSULE, DELAYED RELEASE ORAL DAILY
Qty: 90 CAPSULE | Refills: 3 | Status: SHIPPED | OUTPATIENT
Start: 2018-12-18 | End: 2019-03-01 | Stop reason: ALTCHOICE

## 2019-03-01 ENCOUNTER — OFFICE VISIT (OUTPATIENT)
Dept: INTERNAL MEDICINE CLINIC | Facility: CLINIC | Age: 49
End: 2019-03-01
Payer: COMMERCIAL

## 2019-03-01 VITALS
BODY MASS INDEX: 23.33 KG/M2 | OXYGEN SATURATION: 99 % | SYSTOLIC BLOOD PRESSURE: 124 MMHG | WEIGHT: 172.25 LBS | DIASTOLIC BLOOD PRESSURE: 70 MMHG | HEIGHT: 72 IN | TEMPERATURE: 98.3 F | HEART RATE: 63 BPM

## 2019-03-01 DIAGNOSIS — J32.0 MAXILLARY SINUSITIS, UNSPECIFIED CHRONICITY: ICD-10-CM

## 2019-03-01 DIAGNOSIS — Z12.11 COLON CANCER SCREENING: Primary | ICD-10-CM

## 2019-03-01 DIAGNOSIS — R53.83 OTHER FATIGUE: ICD-10-CM

## 2019-03-01 DIAGNOSIS — E78.2 MIXED HYPERLIPIDEMIA: ICD-10-CM

## 2019-03-01 DIAGNOSIS — I10 ESSENTIAL HYPERTENSION: ICD-10-CM

## 2019-03-01 DIAGNOSIS — R91.1 LUNG NODULE: ICD-10-CM

## 2019-03-01 DIAGNOSIS — R22.0 FACIAL MASS: ICD-10-CM

## 2019-03-01 DIAGNOSIS — F41.9 ANXIETY: ICD-10-CM

## 2019-03-01 PROCEDURE — 4004F PT TOBACCO SCREEN RCVD TLK: CPT | Performed by: INTERNAL MEDICINE

## 2019-03-01 PROCEDURE — 3078F DIAST BP <80 MM HG: CPT | Performed by: INTERNAL MEDICINE

## 2019-03-01 PROCEDURE — 3074F SYST BP LT 130 MM HG: CPT | Performed by: INTERNAL MEDICINE

## 2019-03-01 PROCEDURE — 99213 OFFICE O/P EST LOW 20 MIN: CPT | Performed by: INTERNAL MEDICINE

## 2019-03-01 PROCEDURE — 3008F BODY MASS INDEX DOCD: CPT | Performed by: INTERNAL MEDICINE

## 2019-03-01 RX ORDER — BUPROPION HYDROCHLORIDE 150 MG/1
150 TABLET ORAL DAILY
Qty: 30 TABLET | Refills: 5 | Status: SHIPPED | OUTPATIENT
Start: 2019-03-01 | End: 2019-09-30 | Stop reason: SDUPTHER

## 2019-03-01 RX ORDER — AZITHROMYCIN 250 MG/1
500 TABLET, FILM COATED ORAL EVERY 24 HOURS
Qty: 10 TABLET | Refills: 0 | Status: SHIPPED | OUTPATIENT
Start: 2019-03-01 | End: 2019-03-06

## 2019-03-01 NOTE — PROGRESS NOTES
Assessment/Plan:         Diagnoses and all orders for this visit:    Colon cancer screening  Pt had colonoscopy but cannot find report - will check with medical records Thinks about 2016    Facial mass  Ct sinus and will trial zpack to see if any change    Hx lung nodule  He is overdue for followup screen and has restarted smoking so Ct chest ordered as well    Essential hypertension  -     Comprehensive metabolic panel; Future  -     CK (with reflex to MB); Future  BP stable off meds  Encouraged smoking cessation    Mixed hyperlipidemia  -     Lipid panel; Future  Recheck levels - no recent lab    Other fatigue  -     CBC and differential; Future  -     Testosterone; Future  -     CK (with reflex to MB); Future    Other orders  -     Cancel: Occult Blood, Fecal Immunochemical; Future    Followup once testing completed       Patient ID: Jayjay Hobbs III is a 46 y o  male  HPI   Pt has felt a lump on the right side of his face adjacent to the nose/cheek It has been painful and has been present for about 2 months  Some nasal congestion and hx of sinusitis  Had some vague vision changes   He does not feel cymbalta is helpful He found out today his Mom is not doing well - cardiac issues increasing  He has resumed smoking for several months and has dry cough He states he is drinking less now and things are better at home  He did talk aboiut his granddaughter and how much he enjoys her  No trauma to his face - originally he thought there was a pimple but no drainage or open areas or visible lesions  He has a hx of lung nodule -cannot find specific Ct but patient was aware he was to have followup and never did  Review of Systems   Constitutional: Negative  HENT: Positive for congestion  Eyes: Negative  Respiratory: Positive for cough  Cardiovascular: Negative  Gastrointestinal: Negative  Genitourinary: Negative  Musculoskeletal: Positive for arthralgias and back pain  Allergic/Immunologic: Negative  Neurological: Negative  Hematological: Negative  Psychiatric/Behavioral: The patient is nervous/anxious        Past Medical History:   Diagnosis Date    Chronic pain     BACK    Diabetes mellitus ([address] )     Diverticulitis of colon     Hypertension     resolved 2/17/16    Obesity     Pneumonia     Psychiatric disorder     ANXIETY    Rectal lesion     last assessed 1/12/15     Past Surgical History:   Procedure Laterality Date    BARIATRIC SURGERY  08/2011    ORIF FOREARM FRACTURE Left     excision     Social History     Socioeconomic History    Marital status: /Civil Union     Spouse name: Not on file    Number of children: Not on file    Years of education: Not on file    Highest education level: Not on file   Occupational History    Not on file   Social Needs    Financial resource strain: Not on file    Food insecurity:     Worry: Never true     Inability: Never true   [employer] needs:     Medical: No     Non-medical: No   Tobacco Use    Smoking status: Current Every Day Smoker    Smokeless tobacco: Never Used   Substance and Sexual Activity    Alcohol use: Yes     Comment: OCCASSIONAL    Drug use: No    Sexual activity: Not on file   Lifestyle    Physical activity:     Days per week: Not on file     Minutes per session: Not on file    Stress: Not on file   Relationships    Social connections:     Talks on phone: Not on file     Gets together: Not on file     Attends Islam service: Not on file     Active member of club or organization: Not on file     Attends meetings of clubs or organizations: Not on file     Relationship status: Not on file    Intimate partner violence:     Fear of current or ex partner: No     Emotionally abused: No     Physically abused: No     Forced sexual activity: No   Other Topics Concern    Not on file   Social History Narrative    Caffeine use    Uses safety equip - seat belt     Allergies   Allergen Reactions    Penicillins Hives         /70   Pulse 63   Temp 98 3 °F (36 8 °C) (Tympanic)   Ht 6' (1 829 m)   Wt 78 1 kg (172 lb 4 oz)   SpO2 99%   BMI 23 36 kg/m²          Physical Exam   Constitutional: He is oriented to person, place, and time  He appears well-developed and well-nourished  No distress  HENT:   Head: Normocephalic and atraumatic  Right Ear: External ear normal    Left Ear: External ear normal    Nose: Nose normal    Mouth/Throat: Oropharyngeal exudate present  rasied area under skin next to right nasal passage near sinus and cheek no redness firm to touch   Eyes: Pupils are equal, round, and reactive to light  Conjunctivae and EOM are normal  No scleral icterus  Neck: Normal range of motion  Neck supple  No JVD present  No thyromegaly present  Cardiovascular: Normal rate, regular rhythm, normal heart sounds and intact distal pulses  No murmur heard  Pulmonary/Chest: Effort normal and breath sounds normal    Abdominal: Soft  Bowel sounds are normal    Musculoskeletal: Normal range of motion  Lymphadenopathy:     He has no cervical adenopathy  Neurological: He is alert and oriented to person, place, and time  He displays normal reflexes  No cranial nerve deficit or sensory deficit  He exhibits normal muscle tone  Coordination normal    Skin: Skin is warm and dry  Capillary refill takes less than 2 seconds  He is not diaphoretic  Psychiatric: He has a normal mood and affect   His behavior is normal  Judgment and thought content normal

## 2019-03-05 ENCOUNTER — APPOINTMENT (OUTPATIENT)
Dept: LAB | Facility: MEDICAL CENTER | Age: 49
End: 2019-03-05
Payer: COMMERCIAL

## 2019-03-05 DIAGNOSIS — R53.83 OTHER FATIGUE: ICD-10-CM

## 2019-03-05 DIAGNOSIS — I10 ESSENTIAL HYPERTENSION: ICD-10-CM

## 2019-03-05 DIAGNOSIS — E78.2 MIXED HYPERLIPIDEMIA: ICD-10-CM

## 2019-03-05 LAB
ALBUMIN SERPL BCP-MCNC: 3.7 G/DL (ref 3.5–5)
ALP SERPL-CCNC: 73 U/L (ref 46–116)
ALT SERPL W P-5'-P-CCNC: 18 U/L (ref 12–78)
ANION GAP SERPL CALCULATED.3IONS-SCNC: 6 MMOL/L (ref 4–13)
AST SERPL W P-5'-P-CCNC: 17 U/L (ref 5–45)
BASOPHILS # BLD AUTO: 0.15 THOUSANDS/ΜL (ref 0–0.1)
BASOPHILS NFR BLD AUTO: 2 % (ref 0–1)
BILIRUB SERPL-MCNC: 0.39 MG/DL (ref 0.2–1)
BUN SERPL-MCNC: 16 MG/DL (ref 5–25)
CALCIUM SERPL-MCNC: 8.8 MG/DL (ref 8.3–10.1)
CHLORIDE SERPL-SCNC: 107 MMOL/L (ref 100–108)
CHOLEST SERPL-MCNC: 181 MG/DL (ref 50–200)
CK SERPL-CCNC: 99 U/L (ref 39–308)
CO2 SERPL-SCNC: 30 MMOL/L (ref 21–32)
CREAT SERPL-MCNC: 0.66 MG/DL (ref 0.6–1.3)
EOSINOPHIL # BLD AUTO: 0.28 THOUSAND/ΜL (ref 0–0.61)
EOSINOPHIL NFR BLD AUTO: 4 % (ref 0–6)
ERYTHROCYTE [DISTWIDTH] IN BLOOD BY AUTOMATED COUNT: 17.2 % (ref 11.6–15.1)
GFR SERPL CREATININE-BSD FRML MDRD: 113 ML/MIN/1.73SQ M
GLUCOSE P FAST SERPL-MCNC: 81 MG/DL (ref 65–99)
HCT VFR BLD AUTO: 41.7 % (ref 36.5–49.3)
HDLC SERPL-MCNC: 45 MG/DL (ref 40–60)
HGB BLD-MCNC: 13.3 G/DL (ref 12–17)
IMM GRANULOCYTES # BLD AUTO: 0.03 THOUSAND/UL (ref 0–0.2)
IMM GRANULOCYTES NFR BLD AUTO: 0 % (ref 0–2)
LDLC SERPL CALC-MCNC: 118 MG/DL (ref 0–100)
LYMPHOCYTES # BLD AUTO: 1.69 THOUSANDS/ΜL (ref 0.6–4.47)
LYMPHOCYTES NFR BLD AUTO: 21 % (ref 14–44)
MCH RBC QN AUTO: 27.6 PG (ref 26.8–34.3)
MCHC RBC AUTO-ENTMCNC: 31.9 G/DL (ref 31.4–37.4)
MCV RBC AUTO: 87 FL (ref 82–98)
MONOCYTES # BLD AUTO: 0.58 THOUSAND/ΜL (ref 0.17–1.22)
MONOCYTES NFR BLD AUTO: 7 % (ref 4–12)
NEUTROPHILS # BLD AUTO: 5.31 THOUSANDS/ΜL (ref 1.85–7.62)
NEUTS SEG NFR BLD AUTO: 66 % (ref 43–75)
NONHDLC SERPL-MCNC: 136 MG/DL
NRBC BLD AUTO-RTO: 0 /100 WBCS
PLATELET # BLD AUTO: 369 THOUSANDS/UL (ref 149–390)
PMV BLD AUTO: 9.1 FL (ref 8.9–12.7)
POTASSIUM SERPL-SCNC: 4 MMOL/L (ref 3.5–5.3)
PROT SERPL-MCNC: 6.9 G/DL (ref 6.4–8.2)
RBC # BLD AUTO: 4.82 MILLION/UL (ref 3.88–5.62)
SODIUM SERPL-SCNC: 143 MMOL/L (ref 136–145)
TESTOST SERPL-MCNC: 515 NG/DL (ref 113–1065)
TRIGL SERPL-MCNC: 89 MG/DL
WBC # BLD AUTO: 8.04 THOUSAND/UL (ref 4.31–10.16)

## 2019-03-05 PROCEDURE — 84403 ASSAY OF TOTAL TESTOSTERONE: CPT

## 2019-03-05 PROCEDURE — 85025 COMPLETE CBC W/AUTO DIFF WBC: CPT

## 2019-03-05 PROCEDURE — 82550 ASSAY OF CK (CPK): CPT

## 2019-03-05 PROCEDURE — 80053 COMPREHEN METABOLIC PANEL: CPT

## 2019-03-05 PROCEDURE — 36415 COLL VENOUS BLD VENIPUNCTURE: CPT

## 2019-03-05 PROCEDURE — 80061 LIPID PANEL: CPT

## 2019-03-08 ENCOUNTER — HOSPITAL ENCOUNTER (OUTPATIENT)
Dept: CT IMAGING | Facility: HOSPITAL | Age: 49
Discharge: HOME | End: 2019-03-08
Attending: INTERNAL MEDICINE
Payer: COMMERCIAL

## 2019-03-08 DIAGNOSIS — R91.1 LUNG NODULE: ICD-10-CM

## 2019-03-08 DIAGNOSIS — R22.0 FACIAL MASS: ICD-10-CM

## 2019-03-08 PROCEDURE — 70486 CT MAXILLOFACIAL W/O DYE: CPT

## 2019-03-08 PROCEDURE — 71250 CT THORAX DX C-: CPT

## 2019-03-14 DIAGNOSIS — R91.8 LUNG MASS: Primary | ICD-10-CM

## 2019-03-14 DIAGNOSIS — R91.1 LUNG NODULE: ICD-10-CM

## 2019-03-21 ENCOUNTER — HOSPITAL ENCOUNTER (OUTPATIENT)
Dept: RADIOLOGY | Age: 49
Discharge: HOME | End: 2019-03-21
Payer: COMMERCIAL

## 2019-03-21 ENCOUNTER — TELEPHONE (OUTPATIENT)
Dept: INTERNAL MEDICINE CLINIC | Facility: CLINIC | Age: 49
End: 2019-03-21

## 2019-03-21 DIAGNOSIS — R91.1 LUNG NODULE: ICD-10-CM

## 2019-03-21 LAB — GLUCOSE SERPL-MCNC: 86 MG/DL (ref 65–140)

## 2019-03-21 PROCEDURE — 78815 PET IMAGE W/CT SKULL-THIGH: CPT

## 2019-03-21 PROCEDURE — 82948 REAGENT STRIP/BLOOD GLUCOSE: CPT

## 2019-03-21 PROCEDURE — A9552 F18 FDG: HCPCS

## 2019-03-22 DIAGNOSIS — K21.9 GASTROESOPHAGEAL REFLUX DISEASE, ESOPHAGITIS PRESENCE NOT SPECIFIED: Primary | ICD-10-CM

## 2019-03-22 DIAGNOSIS — R93.89 ABNORMAL FINDING OF DIAGNOSTIC IMAGING: ICD-10-CM

## 2019-03-25 ENCOUNTER — TELEPHONE (OUTPATIENT)
Dept: GASTROENTEROLOGY | Facility: CLINIC | Age: 49
End: 2019-03-25

## 2019-03-25 NOTE — TELEPHONE ENCOUNTER
New pt called to sched an appt in the [place] office for Gastroesophageal reflux disease, esophagitis presence not specified/Abnormal finding of diagnostic imaging as per referral  Pt can be reached at [phone]

## 2019-03-26 DIAGNOSIS — J06.9 UPPER RESPIRATORY TRACT INFECTION, UNSPECIFIED TYPE: Primary | ICD-10-CM

## 2019-03-26 RX ORDER — AZITHROMYCIN 250 MG/1
TABLET, FILM COATED ORAL
Qty: 6 TABLET | Refills: 0 | Status: SHIPPED | OUTPATIENT
Start: 2019-03-26 | End: 2019-03-30

## 2019-04-12 ENCOUNTER — CONSULT (OUTPATIENT)
Dept: GASTROENTEROLOGY | Facility: HOSPITAL | Age: 49
End: 2019-04-12
Attending: INTERNAL MEDICINE
Payer: COMMERCIAL

## 2019-04-12 VITALS
TEMPERATURE: 99.7 F | HEART RATE: 69 BPM | HEIGHT: 72 IN | WEIGHT: 173.2 LBS | SYSTOLIC BLOOD PRESSURE: 154 MMHG | BODY MASS INDEX: 23.46 KG/M2 | DIASTOLIC BLOOD PRESSURE: 77 MMHG

## 2019-04-12 DIAGNOSIS — Z86.010 HISTORY OF COLON POLYPS: ICD-10-CM

## 2019-04-12 DIAGNOSIS — R10.13 EPIGASTRIC PAIN: ICD-10-CM

## 2019-04-12 DIAGNOSIS — R93.89 ABNORMAL FINDING OF DIAGNOSTIC IMAGING: Primary | ICD-10-CM

## 2019-04-12 DIAGNOSIS — Z87.19 HISTORY OF RECTAL BLEEDING: ICD-10-CM

## 2019-04-12 DIAGNOSIS — K21.9 GASTROESOPHAGEAL REFLUX DISEASE, ESOPHAGITIS PRESENCE NOT SPECIFIED: ICD-10-CM

## 2019-04-12 PROCEDURE — 99204 OFFICE O/P NEW MOD 45 MIN: CPT | Performed by: INTERNAL MEDICINE

## 2019-04-15 PROBLEM — K21.9 GASTROESOPHAGEAL REFLUX DISEASE: Status: ACTIVE | Noted: 2019-04-15

## 2019-04-15 PROBLEM — R93.89 ABNORMAL FINDING OF DIAGNOSTIC IMAGING: Status: ACTIVE | Noted: 2019-04-15

## 2019-04-15 PROBLEM — Z86.010 HISTORY OF COLON POLYPS: Status: ACTIVE | Noted: 2019-04-15

## 2019-04-15 PROBLEM — Z86.0100 HISTORY OF COLON POLYPS: Status: ACTIVE | Noted: 2019-04-15

## 2019-04-19 ENCOUNTER — OFFICE VISIT (OUTPATIENT)
Dept: OTOLARYNGOLOGY | Facility: CLINIC | Age: 49
End: 2019-04-19
Payer: COMMERCIAL

## 2019-04-19 VITALS
WEIGHT: 170 LBS | BODY MASS INDEX: 23.03 KG/M2 | HEIGHT: 72 IN | DIASTOLIC BLOOD PRESSURE: 78 MMHG | HEART RATE: 70 BPM | SYSTOLIC BLOOD PRESSURE: 150 MMHG

## 2019-04-19 DIAGNOSIS — J39.2 OROPHARYNGEAL MASS: Primary | ICD-10-CM

## 2019-04-19 DIAGNOSIS — Z72.0 TOBACCO ABUSE: ICD-10-CM

## 2019-04-19 PROCEDURE — 99203 OFFICE O/P NEW LOW 30 MIN: CPT | Performed by: OTOLARYNGOLOGY

## 2019-04-19 PROCEDURE — 99406 BEHAV CHNG SMOKING 3-10 MIN: CPT | Performed by: OTOLARYNGOLOGY

## 2019-04-19 PROCEDURE — 31575 DIAGNOSTIC LARYNGOSCOPY: CPT | Performed by: OTOLARYNGOLOGY

## 2019-04-23 ENCOUNTER — ANESTHESIA EVENT (OUTPATIENT)
Dept: PERIOP | Facility: HOSPITAL | Age: 49
End: 2019-04-23
Payer: COMMERCIAL

## 2019-04-24 ENCOUNTER — ANESTHESIA (OUTPATIENT)
Dept: PERIOP | Facility: HOSPITAL | Age: 49
End: 2019-04-24
Payer: COMMERCIAL

## 2019-04-24 ENCOUNTER — HOSPITAL ENCOUNTER (OUTPATIENT)
Facility: HOSPITAL | Age: 49
Setting detail: OUTPATIENT SURGERY
Discharge: HOME | End: 2019-04-24
Attending: INTERNAL MEDICINE | Admitting: INTERNAL MEDICINE
Payer: COMMERCIAL

## 2019-04-24 VITALS
OXYGEN SATURATION: 99 % | TEMPERATURE: 97.8 F | HEART RATE: 58 BPM | WEIGHT: 170 LBS | HEIGHT: 72 IN | SYSTOLIC BLOOD PRESSURE: 138 MMHG | DIASTOLIC BLOOD PRESSURE: 77 MMHG | BODY MASS INDEX: 23.03 KG/M2 | RESPIRATION RATE: 18 BRPM

## 2019-04-24 DIAGNOSIS — Z86.010 HISTORY OF COLON POLYPS: ICD-10-CM

## 2019-04-24 DIAGNOSIS — K21.9 GASTROESOPHAGEAL REFLUX DISEASE, ESOPHAGITIS PRESENCE NOT SPECIFIED: ICD-10-CM

## 2019-04-24 DIAGNOSIS — R93.89 ABNORMAL FINDING OF DIAGNOSTIC IMAGING: ICD-10-CM

## 2019-04-24 LAB — GLUCOSE SERPL-MCNC: 83 MG/DL (ref 65–140)

## 2019-04-24 PROCEDURE — 82948 REAGENT STRIP/BLOOD GLUCOSE: CPT

## 2019-04-24 PROCEDURE — 88305 TISSUE EXAM BY PATHOLOGIST: CPT | Performed by: PATHOLOGY

## 2019-04-24 PROCEDURE — 88313 SPECIAL STAINS GROUP 2: CPT | Performed by: PATHOLOGY

## 2019-04-24 RX ORDER — PROPOFOL 10 MG/ML
INJECTION, EMULSION INTRAVENOUS AS NEEDED
Status: DISCONTINUED | OUTPATIENT
Start: 2019-04-24 | End: 2019-04-24 | Stop reason: SURG

## 2019-04-24 RX ORDER — LIDOCAINE HYDROCHLORIDE 10 MG/ML
INJECTION, SOLUTION INFILTRATION; PERINEURAL AS NEEDED
Status: DISCONTINUED | OUTPATIENT
Start: 2019-04-24 | End: 2019-04-24 | Stop reason: SURG

## 2019-04-24 RX ORDER — SODIUM CHLORIDE, SODIUM LACTATE, POTASSIUM CHLORIDE, CALCIUM CHLORIDE 600; 310; 30; 20 MG/100ML; MG/100ML; MG/100ML; MG/100ML
100 INJECTION, SOLUTION INTRAVENOUS CONTINUOUS
Status: DISCONTINUED | OUTPATIENT
Start: 2019-04-24 | End: 2019-04-24 | Stop reason: HOSPADM

## 2019-04-24 RX ORDER — METOCLOPRAMIDE HYDROCHLORIDE 5 MG/ML
10 INJECTION INTRAMUSCULAR; INTRAVENOUS ONCE AS NEEDED
Status: DISCONTINUED | OUTPATIENT
Start: 2019-04-24 | End: 2019-04-24 | Stop reason: HOSPADM

## 2019-04-24 RX ORDER — PROPOFOL 10 MG/ML
INJECTION, EMULSION INTRAVENOUS CONTINUOUS PRN
Status: DISCONTINUED | OUTPATIENT
Start: 2019-04-24 | End: 2019-04-24 | Stop reason: SURG

## 2019-04-24 RX ORDER — SODIUM CHLORIDE, SODIUM LACTATE, POTASSIUM CHLORIDE, CALCIUM CHLORIDE 600; 310; 30; 20 MG/100ML; MG/100ML; MG/100ML; MG/100ML
125 INJECTION, SOLUTION INTRAVENOUS CONTINUOUS
Status: DISCONTINUED | OUTPATIENT
Start: 2019-04-24 | End: 2019-04-24 | Stop reason: HOSPADM

## 2019-04-24 RX ORDER — ONDANSETRON 2 MG/ML
4 INJECTION INTRAMUSCULAR; INTRAVENOUS ONCE AS NEEDED
Status: DISCONTINUED | OUTPATIENT
Start: 2019-04-24 | End: 2019-04-24 | Stop reason: HOSPADM

## 2019-04-24 RX ADMIN — SODIUM CHLORIDE, SODIUM LACTATE, POTASSIUM CHLORIDE, AND CALCIUM CHLORIDE 125 ML/HR: .6; .31; .03; .02 INJECTION, SOLUTION INTRAVENOUS at 09:29

## 2019-04-24 RX ADMIN — SODIUM CHLORIDE, SODIUM LACTATE, POTASSIUM CHLORIDE, AND CALCIUM CHLORIDE: .6; .31; .03; .02 INJECTION, SOLUTION INTRAVENOUS at 10:09

## 2019-04-24 RX ADMIN — PROPOFOL 100 MG: 10 INJECTION, EMULSION INTRAVENOUS at 09:40

## 2019-04-24 RX ADMIN — PROPOFOL 100 MCG/KG/MIN: 10 INJECTION, EMULSION INTRAVENOUS at 09:55

## 2019-04-24 RX ADMIN — PROPOFOL 100 MG: 10 INJECTION, EMULSION INTRAVENOUS at 09:51

## 2019-04-24 RX ADMIN — PROPOFOL 100 MG: 10 INJECTION, EMULSION INTRAVENOUS at 09:44

## 2019-04-24 RX ADMIN — LIDOCAINE HYDROCHLORIDE 50 MG: 10 INJECTION, SOLUTION INFILTRATION; PERINEURAL at 09:40

## 2019-05-06 ENCOUNTER — TELEPHONE (OUTPATIENT)
Dept: GASTROENTEROLOGY | Facility: MEDICAL CENTER | Age: 49
End: 2019-05-06

## 2019-05-06 DIAGNOSIS — R93.3 ABNORMAL FINDING ON GI TRACT IMAGING: Primary | ICD-10-CM

## 2019-05-07 ENCOUNTER — TELEPHONE (OUTPATIENT)
Dept: OTOLARYNGOLOGY | Facility: CLINIC | Age: 49
End: 2019-05-07

## 2019-05-09 ENCOUNTER — HOSPITAL ENCOUNTER (OUTPATIENT)
Dept: NON INVASIVE DIAGNOSTICS | Facility: HOSPITAL | Age: 49
Discharge: HOME | End: 2019-05-09
Attending: OTOLARYNGOLOGY
Payer: COMMERCIAL

## 2019-05-09 ENCOUNTER — APPOINTMENT (OUTPATIENT)
Dept: LAB | Facility: MEDICAL CENTER | Age: 49
End: 2019-05-09
Payer: COMMERCIAL

## 2019-05-09 DIAGNOSIS — J39.2 OROPHARYNGEAL MASS: ICD-10-CM

## 2019-05-09 LAB
ANION GAP SERPL CALCULATED.3IONS-SCNC: 5 MMOL/L (ref 4–13)
ATRIAL RATE: 63 BPM
BASOPHILS # BLD AUTO: 0.13 THOUSANDS/ΜL (ref 0–0.1)
BASOPHILS NFR BLD AUTO: 1 % (ref 0–1)
BUN SERPL-MCNC: 19 MG/DL (ref 5–25)
CALCIUM SERPL-MCNC: 9.2 MG/DL (ref 8.3–10.1)
CHLORIDE SERPL-SCNC: 105 MMOL/L (ref 100–108)
CO2 SERPL-SCNC: 28 MMOL/L (ref 21–32)
CREAT SERPL-MCNC: 0.8 MG/DL (ref 0.6–1.3)
EOSINOPHIL # BLD AUTO: 0.2 THOUSAND/ΜL (ref 0–0.61)
EOSINOPHIL NFR BLD AUTO: 2 % (ref 0–6)
ERYTHROCYTE [DISTWIDTH] IN BLOOD BY AUTOMATED COUNT: 17.5 % (ref 11.6–15.1)
GFR SERPL CREATININE-BSD FRML MDRD: 104 ML/MIN/1.73SQ M
GLUCOSE SERPL-MCNC: 97 MG/DL (ref 65–140)
HCT VFR BLD AUTO: 42.9 % (ref 36.5–49.3)
HGB BLD-MCNC: 13.5 G/DL (ref 12–17)
IMM GRANULOCYTES # BLD AUTO: 0.02 THOUSAND/UL (ref 0–0.2)
IMM GRANULOCYTES NFR BLD AUTO: 0 % (ref 0–2)
LYMPHOCYTES # BLD AUTO: 1.24 THOUSANDS/ΜL (ref 0.6–4.47)
LYMPHOCYTES NFR BLD AUTO: 13 % (ref 14–44)
MCH RBC QN AUTO: 27.9 PG (ref 26.8–34.3)
MCHC RBC AUTO-ENTMCNC: 31.5 G/DL (ref 31.4–37.4)
MCV RBC AUTO: 89 FL (ref 82–98)
MONOCYTES # BLD AUTO: 0.8 THOUSAND/ΜL (ref 0.17–1.22)
MONOCYTES NFR BLD AUTO: 8 % (ref 4–12)
NEUTROPHILS # BLD AUTO: 7.09 THOUSANDS/ΜL (ref 1.85–7.62)
NEUTS SEG NFR BLD AUTO: 76 % (ref 43–75)
NRBC BLD AUTO-RTO: 0 /100 WBCS
P AXIS: 76 DEGREES
PLATELET # BLD AUTO: 405 THOUSANDS/UL (ref 149–390)
PMV BLD AUTO: 8.9 FL (ref 8.9–12.7)
POTASSIUM SERPL-SCNC: 4 MMOL/L (ref 3.5–5.3)
PR INTERVAL: 138 MS
QRS AXIS: -43 DEGREES
QRSD INTERVAL: 144 MS
QT INTERVAL: 426 MS
QTC INTERVAL: 435 MS
RBC # BLD AUTO: 4.84 MILLION/UL (ref 3.88–5.62)
SODIUM SERPL-SCNC: 138 MMOL/L (ref 136–145)
T WAVE AXIS: 32 DEGREES
VENTRICULAR RATE: 63 BPM
WBC # BLD AUTO: 9.48 THOUSAND/UL (ref 4.31–10.16)

## 2019-05-09 PROCEDURE — 85025 COMPLETE CBC W/AUTO DIFF WBC: CPT

## 2019-05-09 PROCEDURE — 93005 ELECTROCARDIOGRAM TRACING: CPT

## 2019-05-09 PROCEDURE — 80048 BASIC METABOLIC PNL TOTAL CA: CPT

## 2019-05-09 PROCEDURE — 93010 ELECTROCARDIOGRAM REPORT: CPT | Performed by: INTERNAL MEDICINE

## 2019-05-09 PROCEDURE — 36415 COLL VENOUS BLD VENIPUNCTURE: CPT

## 2019-05-10 ENCOUNTER — CONSULT (OUTPATIENT)
Dept: INTERNAL MEDICINE CLINIC | Facility: CLINIC | Age: 49
End: 2019-05-10
Payer: COMMERCIAL

## 2019-05-10 VITALS
TEMPERATURE: 99.2 F | BODY MASS INDEX: 23.06 KG/M2 | DIASTOLIC BLOOD PRESSURE: 78 MMHG | WEIGHT: 170.25 LBS | OXYGEN SATURATION: 98 % | HEIGHT: 72 IN | HEART RATE: 80 BPM | SYSTOLIC BLOOD PRESSURE: 124 MMHG

## 2019-05-10 DIAGNOSIS — R91.1 LUNG NODULE: ICD-10-CM

## 2019-05-10 DIAGNOSIS — K21.9 GASTROESOPHAGEAL REFLUX DISEASE, ESOPHAGITIS PRESENCE NOT SPECIFIED: ICD-10-CM

## 2019-05-10 DIAGNOSIS — Z72.0 TOBACCO ABUSE: ICD-10-CM

## 2019-05-10 DIAGNOSIS — J39.2 OROPHARYNGEAL MASS: Primary | ICD-10-CM

## 2019-05-10 PROBLEM — K91.2 POSTSURGICAL MALABSORPTION: Status: RESOLVED | Noted: 2018-06-01 | Resolved: 2019-05-10

## 2019-05-10 PROBLEM — Z98.84 BARIATRIC SURGERY STATUS: Status: RESOLVED | Noted: 2018-06-01 | Resolved: 2019-05-10

## 2019-05-10 PROCEDURE — 99214 OFFICE O/P EST MOD 30 MIN: CPT | Performed by: INTERNAL MEDICINE

## 2019-05-15 ENCOUNTER — ANESTHESIA EVENT (OUTPATIENT)
Dept: PERIOP | Facility: HOSPITAL | Age: 49
End: 2019-05-15
Payer: COMMERCIAL

## 2019-05-15 RX ORDER — SODIUM CHLORIDE, SODIUM LACTATE, POTASSIUM CHLORIDE, CALCIUM CHLORIDE 600; 310; 30; 20 MG/100ML; MG/100ML; MG/100ML; MG/100ML
125 INJECTION, SOLUTION INTRAVENOUS CONTINUOUS
Status: CANCELLED | OUTPATIENT
Start: 2019-05-15

## 2019-05-15 RX ORDER — LIDOCAINE HYDROCHLORIDE 10 MG/ML
0.5 INJECTION, SOLUTION EPIDURAL; INFILTRATION; INTRACAUDAL; PERINEURAL ONCE AS NEEDED
Status: CANCELLED | OUTPATIENT
Start: 2019-05-15

## 2019-05-16 ENCOUNTER — ANESTHESIA (OUTPATIENT)
Dept: PERIOP | Facility: HOSPITAL | Age: 49
End: 2019-05-16
Payer: COMMERCIAL

## 2019-05-16 ENCOUNTER — HOSPITAL ENCOUNTER (OUTPATIENT)
Facility: HOSPITAL | Age: 49
Setting detail: OUTPATIENT SURGERY
Discharge: HOME | End: 2019-05-16
Attending: OTOLARYNGOLOGY | Admitting: OTOLARYNGOLOGY
Payer: COMMERCIAL

## 2019-05-16 VITALS
OXYGEN SATURATION: 100 % | HEIGHT: 72 IN | SYSTOLIC BLOOD PRESSURE: 155 MMHG | WEIGHT: 170 LBS | TEMPERATURE: 97 F | BODY MASS INDEX: 23.03 KG/M2 | RESPIRATION RATE: 17 BRPM | DIASTOLIC BLOOD PRESSURE: 81 MMHG | HEART RATE: 63 BPM

## 2019-05-16 DIAGNOSIS — J39.2 OROPHARYNGEAL MASS: ICD-10-CM

## 2019-05-16 LAB — GLUCOSE SERPL-MCNC: 92 MG/DL (ref 65–140)

## 2019-05-16 PROCEDURE — 82948 REAGENT STRIP/BLOOD GLUCOSE: CPT

## 2019-05-16 PROCEDURE — 31622 DX BRONCHOSCOPE/WASH: CPT | Performed by: OTOLARYNGOLOGY

## 2019-05-16 PROCEDURE — 88305 TISSUE EXAM BY PATHOLOGIST: CPT | Performed by: PATHOLOGY

## 2019-05-16 PROCEDURE — 31536 LARYNGOSCOPY W/BX & OP SCOPE: CPT | Performed by: OTOLARYNGOLOGY

## 2019-05-16 RX ORDER — METOCLOPRAMIDE HYDROCHLORIDE 5 MG/ML
10 INJECTION INTRAMUSCULAR; INTRAVENOUS ONCE AS NEEDED
Status: DISCONTINUED | OUTPATIENT
Start: 2019-05-16 | End: 2019-05-16 | Stop reason: HOSPADM

## 2019-05-16 RX ORDER — EPHEDRINE SULFATE 50 MG/ML
INJECTION INTRAVENOUS AS NEEDED
Status: DISCONTINUED | OUTPATIENT
Start: 2019-05-16 | End: 2019-05-16 | Stop reason: SURG

## 2019-05-16 RX ORDER — FENTANYL CITRATE 50 UG/ML
INJECTION, SOLUTION INTRAMUSCULAR; INTRAVENOUS AS NEEDED
Status: DISCONTINUED | OUTPATIENT
Start: 2019-05-16 | End: 2019-05-16 | Stop reason: SURG

## 2019-05-16 RX ORDER — MAGNESIUM HYDROXIDE 1200 MG/15ML
LIQUID ORAL AS NEEDED
Status: DISCONTINUED | OUTPATIENT
Start: 2019-05-16 | End: 2019-05-16 | Stop reason: HOSPADM

## 2019-05-16 RX ORDER — SODIUM CHLORIDE, SODIUM LACTATE, POTASSIUM CHLORIDE, CALCIUM CHLORIDE 600; 310; 30; 20 MG/100ML; MG/100ML; MG/100ML; MG/100ML
100 INJECTION, SOLUTION INTRAVENOUS CONTINUOUS
Status: DISCONTINUED | OUTPATIENT
Start: 2019-05-16 | End: 2019-05-16 | Stop reason: HOSPADM

## 2019-05-16 RX ORDER — OXYMETAZOLINE HYDROCHLORIDE 0.05 G/100ML
SPRAY NASAL AS NEEDED
Status: DISCONTINUED | OUTPATIENT
Start: 2019-05-16 | End: 2019-05-16 | Stop reason: HOSPADM

## 2019-05-16 RX ORDER — GLYCOPYRROLATE 0.2 MG/ML
INJECTION INTRAMUSCULAR; INTRAVENOUS AS NEEDED
Status: DISCONTINUED | OUTPATIENT
Start: 2019-05-16 | End: 2019-05-16 | Stop reason: SURG

## 2019-05-16 RX ORDER — OXYCODONE HCL 5 MG/5 ML
5 SOLUTION, ORAL ORAL EVERY 4 HOURS PRN
Status: DISCONTINUED | OUTPATIENT
Start: 2019-05-16 | End: 2019-05-16 | Stop reason: HOSPADM

## 2019-05-16 RX ORDER — FENTANYL CITRATE/PF 50 MCG/ML
25 SYRINGE (ML) INJECTION
Status: DISCONTINUED | OUTPATIENT
Start: 2019-05-16 | End: 2019-05-16 | Stop reason: HOSPADM

## 2019-05-16 RX ORDER — SODIUM CHLORIDE, SODIUM LACTATE, POTASSIUM CHLORIDE, CALCIUM CHLORIDE 600; 310; 30; 20 MG/100ML; MG/100ML; MG/100ML; MG/100ML
INJECTION, SOLUTION INTRAVENOUS CONTINUOUS PRN
Status: DISCONTINUED | OUTPATIENT
Start: 2019-05-16 | End: 2019-05-16 | Stop reason: SURG

## 2019-05-16 RX ORDER — ROCURONIUM BROMIDE 10 MG/ML
INJECTION, SOLUTION INTRAVENOUS AS NEEDED
Status: DISCONTINUED | OUTPATIENT
Start: 2019-05-16 | End: 2019-05-16 | Stop reason: SURG

## 2019-05-16 RX ORDER — PROMETHAZINE HYDROCHLORIDE 25 MG/ML
12.5 INJECTION, SOLUTION INTRAMUSCULAR; INTRAVENOUS ONCE AS NEEDED
Status: DISCONTINUED | OUTPATIENT
Start: 2019-05-16 | End: 2019-05-16 | Stop reason: HOSPADM

## 2019-05-16 RX ORDER — HYDROMORPHONE HCL/PF 1 MG/ML
0.4 SYRINGE (ML) INJECTION
Status: DISCONTINUED | OUTPATIENT
Start: 2019-05-16 | End: 2019-05-16 | Stop reason: HOSPADM

## 2019-05-16 RX ORDER — MIDAZOLAM HYDROCHLORIDE 1 MG/ML
INJECTION INTRAMUSCULAR; INTRAVENOUS AS NEEDED
Status: DISCONTINUED | OUTPATIENT
Start: 2019-05-16 | End: 2019-05-16 | Stop reason: SURG

## 2019-05-16 RX ORDER — ONDANSETRON 2 MG/ML
4 INJECTION INTRAMUSCULAR; INTRAVENOUS ONCE AS NEEDED
Status: DISCONTINUED | OUTPATIENT
Start: 2019-05-16 | End: 2019-05-16 | Stop reason: HOSPADM

## 2019-05-16 RX ORDER — ACETAMINOPHEN 160 MG/5ML
160 SUSPENSION, ORAL (FINAL DOSE FORM) ORAL EVERY 6 HOURS PRN
Status: DISCONTINUED | OUTPATIENT
Start: 2019-05-16 | End: 2019-05-16 | Stop reason: HOSPADM

## 2019-05-16 RX ORDER — NEOSTIGMINE METHYLSULFATE 1 MG/ML
INJECTION INTRAVENOUS AS NEEDED
Status: DISCONTINUED | OUTPATIENT
Start: 2019-05-16 | End: 2019-05-16 | Stop reason: SURG

## 2019-05-16 RX ORDER — PROPOFOL 10 MG/ML
INJECTION, EMULSION INTRAVENOUS AS NEEDED
Status: DISCONTINUED | OUTPATIENT
Start: 2019-05-16 | End: 2019-05-16 | Stop reason: SURG

## 2019-05-16 RX ORDER — ONDANSETRON 2 MG/ML
INJECTION INTRAMUSCULAR; INTRAVENOUS AS NEEDED
Status: DISCONTINUED | OUTPATIENT
Start: 2019-05-16 | End: 2019-05-16 | Stop reason: SURG

## 2019-05-16 RX ADMIN — OXYCODONE HYDROCHLORIDE 5 MG: 5 SOLUTION ORAL at 11:49

## 2019-05-16 RX ADMIN — ROCURONIUM BROMIDE 10 MG: 10 INJECTION, SOLUTION INTRAVENOUS at 10:41

## 2019-05-16 RX ADMIN — SODIUM CHLORIDE, SODIUM LACTATE, POTASSIUM CHLORIDE, AND CALCIUM CHLORIDE: .6; .31; .03; .02 INJECTION, SOLUTION INTRAVENOUS at 10:16

## 2019-05-16 RX ADMIN — EPHEDRINE SULFATE 10 MG: 50 INJECTION, SOLUTION INTRAVENOUS at 10:46

## 2019-05-16 RX ADMIN — GLYCOPYRROLATE 0.4 MG: 0.2 INJECTION, SOLUTION INTRAMUSCULAR; INTRAVENOUS at 10:49

## 2019-05-16 RX ADMIN — NEOSTIGMINE METHYLSULFATE 3 MG: 1 INJECTION INTRAVENOUS at 10:49

## 2019-05-16 RX ADMIN — PROPOFOL 50 MG: 10 INJECTION, EMULSION INTRAVENOUS at 10:41

## 2019-05-16 RX ADMIN — FENTANYL CITRATE 50 MCG: 50 INJECTION INTRAMUSCULAR; INTRAVENOUS at 10:41

## 2019-05-16 RX ADMIN — MIDAZOLAM HYDROCHLORIDE 2 MG: 1 INJECTION, SOLUTION INTRAMUSCULAR; INTRAVENOUS at 10:22

## 2019-05-16 RX ADMIN — ONDANSETRON 4 MG: 2 INJECTION INTRAMUSCULAR; INTRAVENOUS at 10:22

## 2019-06-03 ENCOUNTER — TELEPHONE (OUTPATIENT)
Dept: GASTROENTEROLOGY | Facility: HOSPITAL | Age: 49
End: 2019-06-03

## 2019-06-03 ENCOUNTER — HOSPITAL ENCOUNTER (OUTPATIENT)
Dept: CT IMAGING | Facility: HOSPITAL | Age: 49
Discharge: HOME | End: 2019-06-03
Attending: INTERNAL MEDICINE
Payer: COMMERCIAL

## 2019-06-03 DIAGNOSIS — R91.1 LUNG NODULE: ICD-10-CM

## 2019-06-03 PROCEDURE — 71260 CT THORAX DX C+: CPT

## 2019-06-03 RX ADMIN — IOHEXOL 85 ML: 350 INJECTION, SOLUTION INTRAVENOUS at 08:13

## 2019-06-04 ENCOUNTER — TELEPHONE (OUTPATIENT)
Dept: GASTROENTEROLOGY | Facility: HOSPITAL | Age: 49
End: 2019-06-04

## 2019-06-04 ENCOUNTER — TELEPHONE (OUTPATIENT)
Dept: SURGERY | Facility: HOSPITAL | Age: 49
End: 2019-06-04

## 2019-07-25 ENCOUNTER — HOSPITAL ENCOUNTER (OUTPATIENT)
Dept: GASTROENTEROLOGY | Facility: HOSPITAL | Age: 49
Setting detail: OUTPATIENT SURGERY
Discharge: HOME | End: 2019-07-25
Attending: INTERNAL MEDICINE

## 2019-09-30 ENCOUNTER — OFFICE VISIT (OUTPATIENT)
Dept: INTERNAL MEDICINE CLINIC | Facility: CLINIC | Age: 49
End: 2019-09-30
Payer: COMMERCIAL

## 2019-09-30 VITALS
BODY MASS INDEX: 22.75 KG/M2 | HEART RATE: 84 BPM | TEMPERATURE: 98.9 F | OXYGEN SATURATION: 98 % | WEIGHT: 168 LBS | HEIGHT: 72 IN | DIASTOLIC BLOOD PRESSURE: 70 MMHG | SYSTOLIC BLOOD PRESSURE: 124 MMHG

## 2019-09-30 DIAGNOSIS — Z23 NEEDS FLU SHOT: ICD-10-CM

## 2019-09-30 DIAGNOSIS — F51.01 PRIMARY INSOMNIA: ICD-10-CM

## 2019-09-30 DIAGNOSIS — F41.9 ANXIETY: ICD-10-CM

## 2019-09-30 DIAGNOSIS — R91.1 LUNG NODULE: ICD-10-CM

## 2019-09-30 DIAGNOSIS — Z00.00 MEDICARE ANNUAL WELLNESS VISIT, SUBSEQUENT: Primary | ICD-10-CM

## 2019-09-30 PROCEDURE — 3008F BODY MASS INDEX DOCD: CPT | Performed by: INTERNAL MEDICINE

## 2019-09-30 PROCEDURE — 90471 IMMUNIZATION ADMIN: CPT

## 2019-09-30 PROCEDURE — G0439 PPPS, SUBSEQ VISIT: HCPCS | Performed by: INTERNAL MEDICINE

## 2019-09-30 PROCEDURE — 90682 RIV4 VACC RECOMBINANT DNA IM: CPT

## 2019-09-30 RX ORDER — TRAZODONE HYDROCHLORIDE 50 MG/1
100 TABLET ORAL
Qty: 90 TABLET | Refills: 3 | Status: SHIPPED | OUTPATIENT
Start: 2019-09-30 | End: 2020-07-08 | Stop reason: SDUPTHER

## 2019-09-30 RX ORDER — BUPROPION HYDROCHLORIDE 150 MG/1
150 TABLET ORAL DAILY
Qty: 30 TABLET | Refills: 5 | Status: SHIPPED | OUTPATIENT
Start: 2019-09-30 | End: 2020-02-25 | Stop reason: ALTCHOICE
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
## 2019-09-30 NOTE — PATIENT INSTRUCTIONS

## 2019-11-12 DIAGNOSIS — J06.9 UPPER RESPIRATORY TRACT INFECTION, UNSPECIFIED TYPE: Primary | ICD-10-CM

## 2019-11-12 DIAGNOSIS — K21.9 GASTROESOPHAGEAL REFLUX DISEASE, ESOPHAGITIS PRESENCE NOT SPECIFIED: ICD-10-CM

## 2019-11-12 RX ORDER — OMEPRAZOLE 40 MG/1
CAPSULE, DELAYED RELEASE ORAL
Qty: 90 CAPSULE | Refills: 0 | Status: SHIPPED | OUTPATIENT
Start: 2019-11-12 | End: 2020-05-27

## 2019-11-12 RX ORDER — AZITHROMYCIN 250 MG/1
TABLET, FILM COATED ORAL
Qty: 6 TABLET | Refills: 0 | Status: SHIPPED | OUTPATIENT
Start: 2019-11-12 | End: 2019-11-16

## 2019-11-12 NOTE — TELEPHONE ENCOUNTER
Pt called that he isn't feeling well and is asking for a z-sandrita for chest congestion and coughing up mucous

## 2019-11-19 ENCOUNTER — OFFICE VISIT (OUTPATIENT)
Dept: INTERNAL MEDICINE CLINIC | Facility: CLINIC | Age: 49
End: 2019-11-19
Payer: COMMERCIAL

## 2019-11-19 VITALS
DIASTOLIC BLOOD PRESSURE: 70 MMHG | WEIGHT: 176.38 LBS | SYSTOLIC BLOOD PRESSURE: 118 MMHG | HEART RATE: 75 BPM | HEIGHT: 72 IN | OXYGEN SATURATION: 98 % | TEMPERATURE: 98.9 F | BODY MASS INDEX: 23.89 KG/M2

## 2019-11-19 DIAGNOSIS — J01.00 SUBACUTE MAXILLARY SINUSITIS: Primary | ICD-10-CM

## 2019-11-19 DIAGNOSIS — Z72.0 TOBACCO ABUSE: ICD-10-CM

## 2019-11-19 DIAGNOSIS — R10.9 FLANK PAIN: Primary | ICD-10-CM

## 2019-11-19 DIAGNOSIS — K21.9 GASTROESOPHAGEAL REFLUX DISEASE, ESOPHAGITIS PRESENCE NOT SPECIFIED: ICD-10-CM

## 2019-11-19 DIAGNOSIS — R10.9 FLANK PAIN: ICD-10-CM

## 2019-11-19 DIAGNOSIS — I10 ESSENTIAL HYPERTENSION: ICD-10-CM

## 2019-11-19 PROCEDURE — 99214 OFFICE O/P EST MOD 30 MIN: CPT | Performed by: INTERNAL MEDICINE

## 2019-11-19 PROCEDURE — 4004F PT TOBACCO SCREEN RCVD TLK: CPT | Performed by: INTERNAL MEDICINE

## 2019-11-19 RX ORDER — METHYLPREDNISOLONE 4 MG/1
TABLET ORAL
Qty: 21 EACH | Refills: 0 | Status: SHIPPED | OUTPATIENT
Start: 2019-11-19 | End: 2020-02-25 | Stop reason: ALTCHOICE

## 2019-11-19 RX ORDER — BENZONATATE 100 MG/1
100 CAPSULE ORAL 3 TIMES DAILY PRN
Qty: 20 CAPSULE | Refills: 0 | Status: SHIPPED | OUTPATIENT
Start: 2019-11-19 | End: 2019-11-26

## 2019-11-19 RX ORDER — DOXYCYCLINE HYCLATE 100 MG/1
100 CAPSULE ORAL EVERY 12 HOURS SCHEDULED
Qty: 14 CAPSULE | Refills: 0 | Status: SHIPPED | OUTPATIENT
Start: 2019-11-19 | End: 2019-11-26
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
## 2020-01-14 ENCOUNTER — HOSPITAL ENCOUNTER (OUTPATIENT)
Dept: CT IMAGING | Facility: HOSPITAL | Age: 50
Discharge: HOME | End: 2020-01-14
Attending: INTERNAL MEDICINE
Payer: COMMERCIAL

## 2020-01-14 DIAGNOSIS — R91.1 LUNG NODULE: ICD-10-CM

## 2020-01-14 PROCEDURE — 71260 CT THORAX DX C+: CPT

## 2020-01-14 RX ADMIN — IOHEXOL 85 ML: 350 INJECTION, SOLUTION INTRAVENOUS at 10:21

## 2020-01-17 DIAGNOSIS — R91.1 LUNG NODULE, SOLITARY: Primary | ICD-10-CM

## 2020-01-21 ENCOUNTER — TELEPHONE (OUTPATIENT)
Dept: INTERVENTIONAL RADIOLOGY/VASCULAR | Facility: HOSPITAL | Age: 50
End: 2020-01-21

## 2020-01-21 RX ORDER — SODIUM CHLORIDE, SODIUM LACTATE, POTASSIUM CHLORIDE, CALCIUM CHLORIDE 600; 310; 30; 20 MG/100ML; MG/100ML; MG/100ML; MG/100ML
50 INJECTION, SOLUTION INTRAVENOUS CONTINUOUS
Status: CANCELLED | OUTPATIENT
Start: 2020-01-21

## 2020-01-21 NOTE — TELEPHONE ENCOUNTER
Called patient to schedule lung biopsy  Patient schedule for 1/27/20 at 0830  Patient to arrive at 0800 for prep  Patient states ride to and from, NPO midnight the night before, and is aware of 2 hours post observation  Consult complete

## 2020-01-22 DIAGNOSIS — R91.1 LUNG NODULE, SOLITARY: Primary | ICD-10-CM

## 2020-01-22 DIAGNOSIS — J06.9 UPPER RESPIRATORY TRACT INFECTION, UNSPECIFIED TYPE: Primary | ICD-10-CM

## 2020-01-22 RX ORDER — AZITHROMYCIN 250 MG/1
TABLET, FILM COATED ORAL
Qty: 6 TABLET | Refills: 0 | Status: SHIPPED | OUTPATIENT
Start: 2020-01-22 | End: 2020-01-26

## 2020-01-22 NOTE — TELEPHONE ENCOUNTER
Pt is requesting a zpack  Pt states that he has a really bad chest cold  Pls advise            All-penicillins

## 2020-01-22 NOTE — PROGRESS NOTES
Spoke with patient after reaching out to IR  Dr Lance Weaver felt lesion is small and getting adequate sample thru bx may be limited He did not feel there was much increase from prior imaging and felt that watching the lesion still was an option  I spoke with patient and for now we will cancel IR procedure  Patient did question whether the lesion could just be removed  I placed referral to Pulmonary for their opinion regarding possible change in lesion, biopsy option and/ or possibility of removal   Patient is agreeable  Dr Lance Weaver aware

## 2020-01-23 ENCOUNTER — TELEPHONE (OUTPATIENT)
Dept: INTERVENTIONAL RADIOLOGY/VASCULAR | Facility: HOSPITAL | Age: 50
End: 2020-01-23

## 2020-01-23 NOTE — TELEPHONE ENCOUNTER
Called patient to confirm canceled appointment  Patient aware of plan to meet with pulmonary  Biopsy canceled

## 2020-01-27 ENCOUNTER — HOSPITAL ENCOUNTER (OUTPATIENT)
Dept: CT IMAGING | Facility: HOSPITAL | Age: 50
Discharge: HOME | End: 2020-01-27
Attending: INTERNAL MEDICINE

## 2020-02-12 ENCOUNTER — TELEPHONE (OUTPATIENT)
Dept: OTHER | Facility: OTHER | Age: 50
End: 2020-02-12

## 2020-02-25 ENCOUNTER — OFFICE VISIT (OUTPATIENT)
Dept: PULMONOLOGY | Facility: CLINIC | Age: 50
End: 2020-02-25
Payer: COMMERCIAL

## 2020-02-25 VITALS
WEIGHT: 165 LBS | OXYGEN SATURATION: 96 % | HEIGHT: 72 IN | SYSTOLIC BLOOD PRESSURE: 128 MMHG | HEART RATE: 67 BPM | BODY MASS INDEX: 22.35 KG/M2 | DIASTOLIC BLOOD PRESSURE: 72 MMHG

## 2020-02-25 DIAGNOSIS — R91.1 LUNG NODULE: Primary | ICD-10-CM

## 2020-02-25 DIAGNOSIS — R91.1 LUNG NODULE, SOLITARY: ICD-10-CM

## 2020-02-25 PROBLEM — F17.210 CIGARETTE NICOTINE DEPENDENCE WITHOUT COMPLICATION: Status: ACTIVE | Noted: 2019-04-19

## 2020-02-25 PROCEDURE — 99406 BEHAV CHNG SMOKING 3-10 MIN: CPT | Performed by: INTERNAL MEDICINE

## 2020-02-25 PROCEDURE — 99204 OFFICE O/P NEW MOD 45 MIN: CPT | Performed by: INTERNAL MEDICINE

## 2020-02-25 NOTE — ASSESSMENT & PLAN NOTE
· Right upper lobe findings are 9 mm in size but do have some spiculation and are associated with a peribronchial distribution  · Agree with interventional Radiology that at this point a percutaneous biopsy is likely low yield  · Navigational bronchoscopy could be a possibility but again this lesion is quite small  He will need a high-resolution CT scan to clarify whether this lesion can be mapped for navigational purposes  · I have recommended we perform a high-resolution CT scan in 6 months to evaluate for possible navigational bronchoscopy  This will be performed in approximately July 2020  · I did explain to the patient and his wife today that this could potentially be a malignant process but currently does not show uptake on PET scan and growth from six-month scan is essentially minimal  · At this point surveillance is the best option
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
## 2020-02-25 NOTE — ASSESSMENT & PLAN NOTE
· Patient and his wife are both smokers  · We discussed tobacco history with him  He previously quit for period of 12 years before returning to smoking  Previously quit cold turkey  · I have counseled him about tobacco cessation and have recommended a significant quit effort  He does seem interested and willing    · Tobacco cessation counseling time 4 minutes

## 2020-03-01 ENCOUNTER — NURSE TRIAGE (OUTPATIENT)
Dept: OTHER | Facility: OTHER | Age: 50
End: 2020-03-01

## 2020-03-01 NOTE — TELEPHONE ENCOUNTER
Reason for Disposition   [1] Sinus congestion as part of a cold AND [2] present < 10 days   [1] Continuous (nonstop) coughing interferes with work or school AND [2] no improvement using cough treatment per Care Advice    Answer Assessment - Initial Assessment Questions  1  LOCATION: "Where does it hurt?"       Sinus pressure  2  ONSET: "When did the sinus pain start?"  (e g , hours, days)       4 - 5 days ago  3  SEVERITY: "How bad is the pain?"   (Scale 1-10; mild, moderate or severe)    - MILD (1-3): doesn't interfere with normal activities     - MODERATE (4-7): interferes with normal activities (e g , work or school) or awakens from sleep    - SEVERE (8-10): excruciating pain and patient unable to do any normal activities         Mild  4  RECURRENT SYMPTOM: "Have you ever had sinus problems before?" If so, ask: "When was the last time?" and "What happened that time?"       No  5  NASAL CONGESTION: "Is the nose blocked?" If so, ask, "Can you open it or must you breathe through the mouth?"      Yes but can breathe through nose  6  NASAL DISCHARGE: "Do you have discharge from your nose?" If so ask, "What color?"     Clear if any  7  FEVER: "Do you have a fever?" If so, ask: "What is it, how was it measured, and when did it start?"       Denies  8  OTHER SYMPTOMS: "Do you have any other symptoms?" (e g , sore throat, cough, earache, difficulty breathing)      Productive, frequent  Cough  Denies difficulty breathing or chest pain  9  PREGNANCY: "Is there any chance you are pregnant?" "When was your last menstrual period?"      N/A    Answer Assessment - Initial Assessment Questions  1  ONSET: "When did the cough begin?"       4 - 5 days ago  2  SEVERITY: "How bad is the cough today?"       Frequent and productive, keeping patient up at night and waking him during sleep  3  RESPIRATORY DISTRESS: "Describe your breathing "       Denies difficulty but does have chest congestion  4   FEVER: "Do you have a fever?" If so, ask: "What is your temperature, how was it measured, and when did it start?"      Denies  5  SPUTUM: "Describe the color of your sputum" (clear, white, yellow, green)      Dark yellow, no blood noticed  6  HEMOPTYSIS: "Are you coughing up any blood?" If so ask: "How much?" (flecks, streaks, tablespoons, etc )     None  7  CARDIAC HISTORY: "Do you have any history of heart disease?" (e g , heart attack, congestive heart failure)       Denies  8  LUNG HISTORY: "Do you have any history of lung disease?"  (e g , pulmonary embolus, asthma, emphysema)      Hx  Of smoking  9  PE RISK FACTORS: "Do you have a history of blood clots?" (or: recent major surgery, recent prolonged travel, bedridden)      Denies  10  OTHER SYMPTOMS: "Do you have any other symptoms?" (e g , runny nose, wheezing, chest pain)       Nasal congestion, post nasal congestion  11  PREGNANCY: "Is there any chance you are pregnant?" "When was your last menstrual period?"        N/A  12   TRAVEL: "Have you traveled out of the country in the last month?" (e g , travel history, exposures)        None    Protocols used: SINUS PAIN OR CONGESTION-ADULT-AH, COUGH - ACUTE PRODUCTIVE-ADULT-AH

## 2020-03-01 NOTE — TELEPHONE ENCOUNTER
Regarding: Congestion  ----- Message from Rachana Pillai sent at 3/1/2020 10:33 AM EST -----  "I am very congested "

## 2020-03-02 ENCOUNTER — TELEPHONE (OUTPATIENT)
Dept: INTERNAL MEDICINE CLINIC | Facility: CLINIC | Age: 50
End: 2020-03-02

## 2020-03-02 ENCOUNTER — OFFICE VISIT (OUTPATIENT)
Dept: INTERNAL MEDICINE CLINIC | Facility: CLINIC | Age: 50
End: 2020-03-02
Payer: COMMERCIAL

## 2020-03-02 VITALS
SYSTOLIC BLOOD PRESSURE: 118 MMHG | HEIGHT: 72 IN | WEIGHT: 166.38 LBS | BODY MASS INDEX: 22.54 KG/M2 | TEMPERATURE: 98.8 F | OXYGEN SATURATION: 99 % | HEART RATE: 69 BPM | DIASTOLIC BLOOD PRESSURE: 70 MMHG

## 2020-03-02 DIAGNOSIS — J01.00 SUBACUTE MAXILLARY SINUSITIS: Primary | ICD-10-CM

## 2020-03-02 PROCEDURE — 4004F PT TOBACCO SCREEN RCVD TLK: CPT | Performed by: INTERNAL MEDICINE

## 2020-03-02 PROCEDURE — 99213 OFFICE O/P EST LOW 20 MIN: CPT | Performed by: INTERNAL MEDICINE

## 2020-03-02 PROCEDURE — 3074F SYST BP LT 130 MM HG: CPT | Performed by: INTERNAL MEDICINE

## 2020-03-02 PROCEDURE — 3008F BODY MASS INDEX DOCD: CPT | Performed by: INTERNAL MEDICINE

## 2020-03-02 PROCEDURE — 3078F DIAST BP <80 MM HG: CPT | Performed by: INTERNAL MEDICINE

## 2020-03-02 RX ORDER — AZITHROMYCIN 250 MG/1
250 TABLET, FILM COATED ORAL DAILY
Qty: 6 TABLET | Refills: 0 | Status: SHIPPED | OUTPATIENT
Start: 2020-03-02 | End: 2020-03-07
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
## 2020-03-27 ENCOUNTER — TELEPHONE (OUTPATIENT)
Dept: INTERNAL MEDICINE CLINIC | Facility: CLINIC | Age: 50
End: 2020-03-27

## 2020-03-27 ENCOUNTER — TELEMEDICINE (OUTPATIENT)
Dept: INTERNAL MEDICINE CLINIC | Facility: CLINIC | Age: 50
End: 2020-03-27
Payer: COMMERCIAL

## 2020-03-27 DIAGNOSIS — R50.9 FEVER, UNSPECIFIED FEVER CAUSE: Primary | ICD-10-CM

## 2020-03-27 DIAGNOSIS — Z20.828 EXPOSURE TO SARS-ASSOCIATED CORONAVIRUS: ICD-10-CM

## 2020-03-27 PROCEDURE — 99213 OFFICE O/P EST LOW 20 MIN: CPT | Performed by: INTERNAL MEDICINE

## 2020-03-27 PROCEDURE — 87635 SARS-COV-2 COVID-19 AMP PRB: CPT

## 2020-03-27 NOTE — TELEPHONE ENCOUNTER
Temp yesterday 100 4-100 6, today down to 98 6  Slight cough  No sore throat  Patient with sinus pressure      No travel  Daughter/son in law quarantined-last around her last Friday
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
## 2020-04-01 LAB — SARS-COV-2 RNA SPEC QL NAA+PROBE: NOT DETECTED

## 2020-05-15 ENCOUNTER — ANESTHESIA EVENT (OUTPATIENT)
Dept: PERIOP | Facility: HOSPITAL | Age: 50
End: 2020-05-15
Payer: COMMERCIAL

## 2020-05-15 ENCOUNTER — ANESTHESIA (OUTPATIENT)
Dept: PERIOP | Facility: HOSPITAL | Age: 50
End: 2020-05-15
Payer: COMMERCIAL

## 2020-05-15 ENCOUNTER — APPOINTMENT (EMERGENCY)
Dept: ULTRASOUND IMAGING | Facility: HOSPITAL | Age: 50
End: 2020-05-15
Payer: COMMERCIAL

## 2020-05-15 ENCOUNTER — APPOINTMENT (EMERGENCY)
Dept: CT IMAGING | Facility: HOSPITAL | Age: 50
End: 2020-05-15
Payer: COMMERCIAL

## 2020-05-15 ENCOUNTER — HOSPITAL ENCOUNTER (OUTPATIENT)
Facility: HOSPITAL | Age: 50
Setting detail: OBSERVATION
Discharge: HOME | End: 2020-05-16
Attending: EMERGENCY MEDICINE | Admitting: SURGERY
Payer: COMMERCIAL

## 2020-05-15 ENCOUNTER — APPOINTMENT (OUTPATIENT)
Dept: RADIOLOGY | Facility: HOSPITAL | Age: 50
End: 2020-05-15
Payer: COMMERCIAL

## 2020-05-15 DIAGNOSIS — R10.9 ABDOMINAL PAIN: ICD-10-CM

## 2020-05-15 DIAGNOSIS — R74.01 TRANSAMINITIS: Primary | ICD-10-CM

## 2020-05-15 DIAGNOSIS — K81.0 ACUTE CHOLECYSTITIS: ICD-10-CM

## 2020-05-15 DIAGNOSIS — K80.12 CALCULUS OF GALLBLADDER WITH ACUTE AND CHRONIC CHOLECYSTITIS WITHOUT OBSTRUCTION: ICD-10-CM

## 2020-05-15 LAB
ALBUMIN SERPL BCP-MCNC: 3.8 G/DL (ref 3.5–5)
ALP SERPL-CCNC: 145 U/L (ref 46–116)
ALT SERPL W P-5'-P-CCNC: 162 U/L (ref 12–78)
ANION GAP SERPL CALCULATED.3IONS-SCNC: 6 MMOL/L (ref 4–13)
APTT PPP: 28 SECONDS (ref 23–37)
AST SERPL W P-5'-P-CCNC: 234 U/L (ref 5–45)
BASOPHILS # BLD AUTO: 0.08 THOUSANDS/ΜL (ref 0–0.1)
BASOPHILS NFR BLD AUTO: 1 % (ref 0–1)
BILIRUB SERPL-MCNC: 1.6 MG/DL (ref 0.2–1)
BUN SERPL-MCNC: 12 MG/DL (ref 5–25)
CALCIUM SERPL-MCNC: 9.1 MG/DL (ref 8.3–10.1)
CHLORIDE SERPL-SCNC: 100 MMOL/L (ref 100–108)
CO2 SERPL-SCNC: 30 MMOL/L (ref 21–32)
CREAT SERPL-MCNC: 0.75 MG/DL (ref 0.6–1.3)
D DIMER PPP FEU-MCNC: 0.46 UG/ML FEU
EOSINOPHIL # BLD AUTO: 0.08 THOUSAND/ΜL (ref 0–0.61)
EOSINOPHIL NFR BLD AUTO: 1 % (ref 0–6)
ERYTHROCYTE [DISTWIDTH] IN BLOOD BY AUTOMATED COUNT: 15.2 % (ref 11.6–15.1)
GFR SERPL CREATININE-BSD FRML MDRD: 106 ML/MIN/1.73SQ M
GLUCOSE SERPL-MCNC: 98 MG/DL (ref 65–140)
HCT VFR BLD AUTO: 44.2 % (ref 36.5–49.3)
HGB BLD-MCNC: 14.7 G/DL (ref 12–17)
IMM GRANULOCYTES # BLD AUTO: 0.03 THOUSAND/UL (ref 0–0.2)
IMM GRANULOCYTES NFR BLD AUTO: 0 % (ref 0–2)
INR PPP: 0.96 (ref 0.84–1.19)
LACTATE SERPL-SCNC: 0.8 MMOL/L (ref 0.5–2)
LIPASE SERPL-CCNC: 142 U/L (ref 73–393)
LYMPHOCYTES # BLD AUTO: 0.6 THOUSANDS/ΜL (ref 0.6–4.47)
LYMPHOCYTES NFR BLD AUTO: 5 % (ref 14–44)
MAGNESIUM SERPL-MCNC: 1.9 MG/DL (ref 1.6–2.6)
MCH RBC QN AUTO: 31.3 PG (ref 26.8–34.3)
MCHC RBC AUTO-ENTMCNC: 33.3 G/DL (ref 31.4–37.4)
MCV RBC AUTO: 94 FL (ref 82–98)
MONOCYTES # BLD AUTO: 0.25 THOUSAND/ΜL (ref 0.17–1.22)
MONOCYTES NFR BLD AUTO: 2 % (ref 4–12)
NEUTROPHILS # BLD AUTO: 10.12 THOUSANDS/ΜL (ref 1.85–7.62)
NEUTS SEG NFR BLD AUTO: 91 % (ref 43–75)
NRBC BLD AUTO-RTO: 0 /100 WBCS
PLATELET # BLD AUTO: 327 THOUSANDS/UL (ref 149–390)
PMV BLD AUTO: 8.3 FL (ref 8.9–12.7)
POTASSIUM SERPL-SCNC: 4.1 MMOL/L (ref 3.5–5.3)
PROT SERPL-MCNC: 7.2 G/DL (ref 6.4–8.2)
PROTHROMBIN TIME: 12.8 SECONDS (ref 11.6–14.5)
RBC # BLD AUTO: 4.69 MILLION/UL (ref 3.88–5.62)
SARS-COV-2 RNA RESP QL NAA+PROBE: NEGATIVE
SODIUM SERPL-SCNC: 136 MMOL/L (ref 136–145)
TROPONIN I SERPL-MCNC: <0.02 NG/ML
WBC # BLD AUTO: 11.16 THOUSAND/UL (ref 4.31–10.16)

## 2020-05-15 PROCEDURE — 47563 LAPARO CHOLECYSTECTOMY/GRAPH: CPT | Performed by: PHYSICIAN ASSISTANT

## 2020-05-15 PROCEDURE — 88304 TISSUE EXAM BY PATHOLOGIST: CPT | Performed by: PATHOLOGY

## 2020-05-15 PROCEDURE — 71260 CT THORAX DX C+: CPT

## 2020-05-15 PROCEDURE — 96365 THER/PROPH/DIAG IV INF INIT: CPT

## 2020-05-15 PROCEDURE — 93005 ELECTROCARDIOGRAM TRACING: CPT

## 2020-05-15 PROCEDURE — 85610 PROTHROMBIN TIME: CPT | Performed by: PHYSICIAN ASSISTANT

## 2020-05-15 PROCEDURE — 85379 FIBRIN DEGRADATION QUANT: CPT | Performed by: PHYSICIAN ASSISTANT

## 2020-05-15 PROCEDURE — 84484 ASSAY OF TROPONIN QUANT: CPT | Performed by: PHYSICIAN ASSISTANT

## 2020-05-15 PROCEDURE — NC001 PR NO CHARGE: Performed by: SURGERY

## 2020-05-15 PROCEDURE — 47563 LAPARO CHOLECYSTECTOMY/GRAPH: CPT | Performed by: SURGERY

## 2020-05-15 PROCEDURE — 96361 HYDRATE IV INFUSION ADD-ON: CPT

## 2020-05-15 PROCEDURE — 76705 ECHO EXAM OF ABDOMEN: CPT

## 2020-05-15 PROCEDURE — 74300 X-RAY BILE DUCTS/PANCREAS: CPT

## 2020-05-15 PROCEDURE — 99219 PR INITIAL OBSERVATION CARE/DAY 50 MINUTES: CPT | Performed by: SURGERY

## 2020-05-15 PROCEDURE — 96376 TX/PRO/DX INJ SAME DRUG ADON: CPT

## 2020-05-15 PROCEDURE — 80053 COMPREHEN METABOLIC PANEL: CPT | Performed by: PHYSICIAN ASSISTANT

## 2020-05-15 PROCEDURE — 85025 COMPLETE CBC W/AUTO DIFF WBC: CPT | Performed by: PHYSICIAN ASSISTANT

## 2020-05-15 PROCEDURE — 83605 ASSAY OF LACTIC ACID: CPT | Performed by: PHYSICIAN ASSISTANT

## 2020-05-15 PROCEDURE — 87635 SARS-COV-2 COVID-19 AMP PRB: CPT | Performed by: PHYSICIAN ASSISTANT

## 2020-05-15 PROCEDURE — 99285 EMERGENCY DEPT VISIT HI MDM: CPT | Performed by: PHYSICIAN ASSISTANT

## 2020-05-15 PROCEDURE — 83690 ASSAY OF LIPASE: CPT | Performed by: PHYSICIAN ASSISTANT

## 2020-05-15 PROCEDURE — 74177 CT ABD & PELVIS W/CONTRAST: CPT

## 2020-05-15 PROCEDURE — 83735 ASSAY OF MAGNESIUM: CPT | Performed by: PHYSICIAN ASSISTANT

## 2020-05-15 PROCEDURE — 96375 TX/PRO/DX INJ NEW DRUG ADDON: CPT

## 2020-05-15 PROCEDURE — 85730 THROMBOPLASTIN TIME PARTIAL: CPT | Performed by: PHYSICIAN ASSISTANT

## 2020-05-15 PROCEDURE — 99285 EMERGENCY DEPT VISIT HI MDM: CPT

## 2020-05-15 PROCEDURE — 36415 COLL VENOUS BLD VENIPUNCTURE: CPT | Performed by: PHYSICIAN ASSISTANT

## 2020-05-15 RX ORDER — BUPIVACAINE HYDROCHLORIDE 5 MG/ML
INJECTION, SOLUTION EPIDURAL; INTRACAUDAL AS NEEDED
Status: DISCONTINUED | OUTPATIENT
Start: 2020-05-15 | End: 2020-05-15 | Stop reason: HOSPADM

## 2020-05-15 RX ORDER — CEFAZOLIN SODIUM 2 G/50ML
2000 SOLUTION INTRAVENOUS ONCE
Status: COMPLETED | OUTPATIENT
Start: 2020-05-15 | End: 2020-05-15

## 2020-05-15 RX ORDER — SODIUM CHLORIDE, SODIUM LACTATE, POTASSIUM CHLORIDE, CALCIUM CHLORIDE 600; 310; 30; 20 MG/100ML; MG/100ML; MG/100ML; MG/100ML
INJECTION, SOLUTION INTRAVENOUS CONTINUOUS PRN
Status: DISCONTINUED | OUTPATIENT
Start: 2020-05-15 | End: 2020-05-15 | Stop reason: SURG

## 2020-05-15 RX ORDER — FENTANYL CITRATE 50 UG/ML
INJECTION, SOLUTION INTRAMUSCULAR; INTRAVENOUS AS NEEDED
Status: DISCONTINUED | OUTPATIENT
Start: 2020-05-15 | End: 2020-05-15 | Stop reason: SURG

## 2020-05-15 RX ORDER — ACETAMINOPHEN 325 MG/1
650 TABLET ORAL EVERY 6 HOURS PRN
Status: DISCONTINUED | OUTPATIENT
Start: 2020-05-15 | End: 2020-05-16 | Stop reason: HOSPADM

## 2020-05-15 RX ORDER — ONDANSETRON 2 MG/ML
4 INJECTION INTRAMUSCULAR; INTRAVENOUS ONCE AS NEEDED
Status: DISCONTINUED | OUTPATIENT
Start: 2020-05-15 | End: 2020-05-15 | Stop reason: HOSPADM

## 2020-05-15 RX ORDER — LORAZEPAM 2 MG/ML
1 INJECTION INTRAMUSCULAR ONCE
Status: COMPLETED | OUTPATIENT
Start: 2020-05-15 | End: 2020-05-15

## 2020-05-15 RX ORDER — SUCCINYLCHOLINE/SOD CL,ISO/PF 100 MG/5ML
SYRINGE (ML) INTRAVENOUS AS NEEDED
Status: DISCONTINUED | OUTPATIENT
Start: 2020-05-15 | End: 2020-05-15 | Stop reason: SURG

## 2020-05-15 RX ORDER — FENTANYL CITRATE 50 UG/ML
INJECTION, SOLUTION INTRAMUSCULAR; INTRAVENOUS
Status: COMPLETED
Start: 2020-05-15 | End: 2020-05-15

## 2020-05-15 RX ORDER — OXYCODONE HYDROCHLORIDE AND ACETAMINOPHEN 5; 325 MG/1; MG/1
1 TABLET ORAL EVERY 6 HOURS PRN
Status: DISCONTINUED | OUTPATIENT
Start: 2020-05-15 | End: 2020-05-16 | Stop reason: HOSPADM

## 2020-05-15 RX ORDER — ALBUTEROL SULFATE 2.5 MG/3ML
2.5 SOLUTION RESPIRATORY (INHALATION) ONCE AS NEEDED
Status: DISCONTINUED | OUTPATIENT
Start: 2020-05-15 | End: 2020-05-15 | Stop reason: HOSPADM

## 2020-05-15 RX ORDER — KETAMINE HYDROCHLORIDE 50 MG/ML
INJECTION, SOLUTION, CONCENTRATE INTRAMUSCULAR; INTRAVENOUS AS NEEDED
Status: DISCONTINUED | OUTPATIENT
Start: 2020-05-15 | End: 2020-05-15 | Stop reason: SURG

## 2020-05-15 RX ORDER — FENTANYL CITRATE/PF 50 MCG/ML
25 SYRINGE (ML) INJECTION
Status: COMPLETED | OUTPATIENT
Start: 2020-05-15 | End: 2020-05-15

## 2020-05-15 RX ORDER — SODIUM CHLORIDE, SODIUM LACTATE, POTASSIUM CHLORIDE, CALCIUM CHLORIDE 600; 310; 30; 20 MG/100ML; MG/100ML; MG/100ML; MG/100ML
125 INJECTION, SOLUTION INTRAVENOUS CONTINUOUS
Status: DISCONTINUED | OUTPATIENT
Start: 2020-05-15 | End: 2020-05-16

## 2020-05-15 RX ORDER — FAMOTIDINE 20 MG/1
20 TABLET, FILM COATED ORAL 2 TIMES DAILY
Status: DISCONTINUED | OUTPATIENT
Start: 2020-05-15 | End: 2020-05-16 | Stop reason: HOSPADM

## 2020-05-15 RX ORDER — EPHEDRINE SULFATE 50 MG/ML
INJECTION INTRAVENOUS AS NEEDED
Status: DISCONTINUED | OUTPATIENT
Start: 2020-05-15 | End: 2020-05-15 | Stop reason: SURG

## 2020-05-15 RX ORDER — NICOTINE 21 MG/24HR
21 PATCH, TRANSDERMAL 24 HOURS TRANSDERMAL ONCE
Status: DISCONTINUED | OUTPATIENT
Start: 2020-05-15 | End: 2020-05-16 | Stop reason: HOSPADM

## 2020-05-15 RX ORDER — CEFAZOLIN SODIUM 1 G/3ML
INJECTION, POWDER, FOR SOLUTION INTRAMUSCULAR; INTRAVENOUS AS NEEDED
Status: DISCONTINUED | OUTPATIENT
Start: 2020-05-15 | End: 2020-05-15 | Stop reason: SURG

## 2020-05-15 RX ORDER — CIPROFLOXACIN 2 MG/ML
400 INJECTION, SOLUTION INTRAVENOUS ONCE
Status: COMPLETED | OUTPATIENT
Start: 2020-05-15 | End: 2020-05-15

## 2020-05-15 RX ORDER — HYDROMORPHONE HCL/PF 1 MG/ML
0.5 SYRINGE (ML) INJECTION
Status: DISCONTINUED | OUTPATIENT
Start: 2020-05-15 | End: 2020-05-15 | Stop reason: HOSPADM

## 2020-05-15 RX ORDER — HYDROMORPHONE HCL/PF 1 MG/ML
0.5 SYRINGE (ML) INJECTION
Status: DISCONTINUED | OUTPATIENT
Start: 2020-05-15 | End: 2020-05-16 | Stop reason: HOSPADM

## 2020-05-15 RX ORDER — MORPHINE SULFATE 4 MG/ML
4 INJECTION, SOLUTION INTRAMUSCULAR; INTRAVENOUS ONCE
Status: COMPLETED | OUTPATIENT
Start: 2020-05-15 | End: 2020-05-15

## 2020-05-15 RX ORDER — DEXAMETHASONE SODIUM PHOSPHATE 10 MG/ML
INJECTION, SOLUTION INTRAMUSCULAR; INTRAVENOUS AS NEEDED
Status: DISCONTINUED | OUTPATIENT
Start: 2020-05-15 | End: 2020-05-15 | Stop reason: SURG

## 2020-05-15 RX ORDER — LIDOCAINE HYDROCHLORIDE 10 MG/ML
INJECTION, SOLUTION EPIDURAL; INFILTRATION; INTRACAUDAL; PERINEURAL AS NEEDED
Status: DISCONTINUED | OUTPATIENT
Start: 2020-05-15 | End: 2020-05-15 | Stop reason: SURG

## 2020-05-15 RX ORDER — PROPOFOL 10 MG/ML
INJECTION, EMULSION INTRAVENOUS AS NEEDED
Status: DISCONTINUED | OUTPATIENT
Start: 2020-05-15 | End: 2020-05-15 | Stop reason: SURG

## 2020-05-15 RX ORDER — ONDANSETRON 2 MG/ML
INJECTION INTRAMUSCULAR; INTRAVENOUS AS NEEDED
Status: DISCONTINUED | OUTPATIENT
Start: 2020-05-15 | End: 2020-05-15 | Stop reason: SURG

## 2020-05-15 RX ORDER — OXYCODONE HYDROCHLORIDE AND ACETAMINOPHEN 5; 325 MG/1; MG/1
2 TABLET ORAL EVERY 6 HOURS PRN
Status: DISCONTINUED | OUTPATIENT
Start: 2020-05-15 | End: 2020-05-16 | Stop reason: HOSPADM

## 2020-05-15 RX ADMIN — FENTANYL CITRATE 25 MCG: 50 INJECTION INTRAMUSCULAR; INTRAVENOUS at 20:58

## 2020-05-15 RX ADMIN — PHENYLEPHRINE HYDROCHLORIDE 200 MCG: 10 INJECTION INTRAVENOUS at 19:56

## 2020-05-15 RX ADMIN — SODIUM CHLORIDE, SODIUM LACTATE, POTASSIUM CHLORIDE, AND CALCIUM CHLORIDE: .6; .31; .03; .02 INJECTION, SOLUTION INTRAVENOUS at 20:19

## 2020-05-15 RX ADMIN — EPHEDRINE SULFATE 10 MG: 50 INJECTION, SOLUTION INTRAVENOUS at 19:47

## 2020-05-15 RX ADMIN — HYDROMORPHONE HYDROCHLORIDE 0.5 MG: 1 INJECTION, SOLUTION INTRAMUSCULAR; INTRAVENOUS; SUBCUTANEOUS at 21:45

## 2020-05-15 RX ADMIN — SODIUM CHLORIDE, SODIUM LACTATE, POTASSIUM CHLORIDE, AND CALCIUM CHLORIDE 125 ML/HR: .6; .31; .03; .02 INJECTION, SOLUTION INTRAVENOUS at 20:44

## 2020-05-15 RX ADMIN — HYDROMORPHONE HYDROCHLORIDE 0.5 MG: 1 INJECTION, SOLUTION INTRAMUSCULAR; INTRAVENOUS; SUBCUTANEOUS at 21:15

## 2020-05-15 RX ADMIN — SODIUM CHLORIDE, SODIUM LACTATE, POTASSIUM CHLORIDE, AND CALCIUM CHLORIDE: .6; .31; .03; .02 INJECTION, SOLUTION INTRAVENOUS at 19:33

## 2020-05-15 RX ADMIN — SODIUM CHLORIDE 1000 ML: 0.9 INJECTION, SOLUTION INTRAVENOUS at 13:22

## 2020-05-15 RX ADMIN — FENTANYL CITRATE 25 MCG: 50 INJECTION INTRAMUSCULAR; INTRAVENOUS at 20:52

## 2020-05-15 RX ADMIN — CEFAZOLIN SODIUM 2000 MG: 2 SOLUTION INTRAVENOUS at 18:42

## 2020-05-15 RX ADMIN — SUGAMMADEX 200 MG: 100 INJECTION, SOLUTION INTRAVENOUS at 20:28

## 2020-05-15 RX ADMIN — FENTANYL CITRATE 100 MCG: 50 INJECTION, SOLUTION INTRAMUSCULAR; INTRAVENOUS at 19:37

## 2020-05-15 RX ADMIN — PROPOFOL 200 MG: 10 INJECTION, EMULSION INTRAVENOUS at 19:37

## 2020-05-15 RX ADMIN — MORPHINE SULFATE 4 MG: 4 INJECTION INTRAVENOUS at 13:46

## 2020-05-15 RX ADMIN — HYDROMORPHONE HYDROCHLORIDE 0.5 MG: 1 INJECTION, SOLUTION INTRAMUSCULAR; INTRAVENOUS; SUBCUTANEOUS at 21:29

## 2020-05-15 RX ADMIN — FAMOTIDINE 20 MG: 20 TABLET, FILM COATED ORAL at 22:53

## 2020-05-15 RX ADMIN — FENTANYL CITRATE 25 MCG: 50 INJECTION INTRAMUSCULAR; INTRAVENOUS at 21:11

## 2020-05-15 RX ADMIN — IOHEXOL 100 ML: 350 INJECTION, SOLUTION INTRAVENOUS at 14:17

## 2020-05-15 RX ADMIN — Medication 100 MG: at 19:37

## 2020-05-15 RX ADMIN — MORPHINE SULFATE 4 MG: 4 INJECTION INTRAVENOUS at 16:41

## 2020-05-15 RX ADMIN — EPHEDRINE SULFATE 5 MG: 50 INJECTION, SOLUTION INTRAVENOUS at 19:42

## 2020-05-15 RX ADMIN — LIDOCAINE HYDROCHLORIDE 50 MG: 10 INJECTION, SOLUTION EPIDURAL; INFILTRATION; INTRACAUDAL; PERINEURAL at 19:37

## 2020-05-15 RX ADMIN — PHENYLEPHRINE HYDROCHLORIDE 100 MCG: 10 INJECTION INTRAVENOUS at 19:54

## 2020-05-15 RX ADMIN — FENTANYL CITRATE 25 MCG: 50 INJECTION INTRAMUSCULAR; INTRAVENOUS at 21:04

## 2020-05-15 RX ADMIN — CIPROFLOXACIN 400 MG: 2 INJECTION, SOLUTION INTRAVENOUS at 16:25

## 2020-05-15 RX ADMIN — OXYCODONE HYDROCHLORIDE AND ACETAMINOPHEN 2 TABLET: 5; 325 TABLET ORAL at 23:34

## 2020-05-15 RX ADMIN — EPHEDRINE SULFATE 5 MG: 50 INJECTION, SOLUTION INTRAVENOUS at 19:44

## 2020-05-15 RX ADMIN — LORAZEPAM 1 MG: 2 INJECTION INTRAMUSCULAR; INTRAVENOUS at 14:32

## 2020-05-15 RX ADMIN — ONDANSETRON HYDROCHLORIDE 4 MG: 2 INJECTION, SOLUTION INTRAVENOUS at 20:19

## 2020-05-15 RX ADMIN — NICOTINE 21 MG: 21 PATCH TRANSDERMAL at 17:09

## 2020-05-15 RX ADMIN — KETAMINE HYDROCHLORIDE 30 MG: 50 INJECTION, SOLUTION INTRAMUSCULAR; INTRAVENOUS at 20:03

## 2020-05-15 RX ADMIN — CEFAZOLIN 2000 MG: 1 INJECTION, POWDER, FOR SOLUTION INTRAVENOUS at 19:44

## 2020-05-15 RX ADMIN — DEXAMETHASONE SODIUM PHOSPHATE 8 MG: 10 INJECTION, SOLUTION INTRAMUSCULAR; INTRAVENOUS at 19:55

## 2020-05-15 RX ADMIN — Medication 25 MG: at 19:47

## 2020-05-16 VITALS
WEIGHT: 169.97 LBS | HEIGHT: 72 IN | DIASTOLIC BLOOD PRESSURE: 64 MMHG | RESPIRATION RATE: 18 BRPM | BODY MASS INDEX: 23.02 KG/M2 | OXYGEN SATURATION: 98 % | TEMPERATURE: 98.4 F | HEART RATE: 74 BPM | SYSTOLIC BLOOD PRESSURE: 115 MMHG

## 2020-05-16 PROBLEM — K80.12 CALCULUS OF GALLBLADDER WITH ACUTE AND CHRONIC CHOLECYSTITIS WITHOUT OBSTRUCTION: Status: RESOLVED | Noted: 2020-05-15 | Resolved: 2020-05-16

## 2020-05-16 LAB
ALBUMIN SERPL BCP-MCNC: 3 G/DL (ref 3.5–5)
ALP SERPL-CCNC: 169 U/L (ref 46–116)
ALT SERPL W P-5'-P-CCNC: 278 U/L (ref 12–78)
ANION GAP SERPL CALCULATED.3IONS-SCNC: 4 MMOL/L (ref 4–13)
AST SERPL W P-5'-P-CCNC: 268 U/L (ref 5–45)
ATRIAL RATE: 176 BPM
BILIRUB SERPL-MCNC: 3.2 MG/DL (ref 0.2–1)
BUN SERPL-MCNC: 7 MG/DL (ref 5–25)
CALCIUM SERPL-MCNC: 8.6 MG/DL (ref 8.3–10.1)
CHLORIDE SERPL-SCNC: 104 MMOL/L (ref 100–108)
CO2 SERPL-SCNC: 30 MMOL/L (ref 21–32)
CREAT SERPL-MCNC: 0.74 MG/DL (ref 0.6–1.3)
ERYTHROCYTE [DISTWIDTH] IN BLOOD BY AUTOMATED COUNT: 15.4 % (ref 11.6–15.1)
GFR SERPL CREATININE-BSD FRML MDRD: 107 ML/MIN/1.73SQ M
GLUCOSE SERPL-MCNC: 158 MG/DL (ref 65–140)
HCT VFR BLD AUTO: 41.1 % (ref 36.5–49.3)
HGB BLD-MCNC: 13.4 G/DL (ref 12–17)
MCH RBC QN AUTO: 30.9 PG (ref 26.8–34.3)
MCHC RBC AUTO-ENTMCNC: 32.6 G/DL (ref 31.4–37.4)
MCV RBC AUTO: 95 FL (ref 82–98)
P AXIS: 71 DEGREES
PLATELET # BLD AUTO: 258 THOUSANDS/UL (ref 149–390)
PMV BLD AUTO: 8.7 FL (ref 8.9–12.7)
POTASSIUM SERPL-SCNC: 4 MMOL/L (ref 3.5–5.3)
PROT SERPL-MCNC: 6.3 G/DL (ref 6.4–8.2)
QRS AXIS: -55 DEGREES
QRSD INTERVAL: 136 MS
QT INTERVAL: 392 MS
QTC INTERVAL: 479 MS
RBC # BLD AUTO: 4.34 MILLION/UL (ref 3.88–5.62)
SODIUM SERPL-SCNC: 138 MMOL/L (ref 136–145)
T WAVE AXIS: 26 DEGREES
VENTRICULAR RATE: 90 BPM
WBC # BLD AUTO: 13.03 THOUSAND/UL (ref 4.31–10.16)

## 2020-05-16 PROCEDURE — 93010 ELECTROCARDIOGRAM REPORT: CPT | Performed by: INTERNAL MEDICINE

## 2020-05-16 PROCEDURE — 80053 COMPREHEN METABOLIC PANEL: CPT | Performed by: PHYSICIAN ASSISTANT

## 2020-05-16 PROCEDURE — 85027 COMPLETE CBC AUTOMATED: CPT | Performed by: PHYSICIAN ASSISTANT

## 2020-05-16 PROCEDURE — 99024 POSTOP FOLLOW-UP VISIT: CPT | Performed by: PHYSICIAN ASSISTANT

## 2020-05-16 PROCEDURE — NC001 PR NO CHARGE: Performed by: PHYSICIAN ASSISTANT

## 2020-05-16 RX ORDER — OXYCODONE HYDROCHLORIDE AND ACETAMINOPHEN 5; 325 MG/1; MG/1
1 TABLET ORAL EVERY 6 HOURS PRN
Qty: 12 TABLET | Refills: 0 | Status: SHIPPED | OUTPATIENT
Start: 2020-05-16 | End: 2020-05-26

## 2020-05-16 RX ADMIN — SODIUM CHLORIDE, SODIUM LACTATE, POTASSIUM CHLORIDE, AND CALCIUM CHLORIDE 125 ML/HR: .6; .31; .03; .02 INJECTION, SOLUTION INTRAVENOUS at 00:28

## 2020-05-16 RX ADMIN — OXYCODONE HYDROCHLORIDE AND ACETAMINOPHEN 2 TABLET: 5; 325 TABLET ORAL at 06:20

## 2020-05-16 RX ADMIN — HYDROMORPHONE HYDROCHLORIDE 0.5 MG: 1 INJECTION, SOLUTION INTRAMUSCULAR; INTRAVENOUS; SUBCUTANEOUS at 08:26

## 2020-05-16 RX ADMIN — SODIUM CHLORIDE 1000 ML: 0.9 INJECTION, SOLUTION INTRAVENOUS at 10:07

## 2020-05-16 RX ADMIN — HYDROMORPHONE HYDROCHLORIDE 0.5 MG: 1 INJECTION, SOLUTION INTRAMUSCULAR; INTRAVENOUS; SUBCUTANEOUS at 02:54

## 2020-05-16 RX ADMIN — FAMOTIDINE 20 MG: 20 TABLET, FILM COATED ORAL at 08:26

## 2020-05-18 ENCOUNTER — TRANSITIONAL CARE MANAGEMENT (OUTPATIENT)
Dept: INTERNAL MEDICINE CLINIC | Facility: CLINIC | Age: 50
End: 2020-05-18

## 2020-05-18 ENCOUNTER — OFFICE VISIT (OUTPATIENT)
Dept: INTERNAL MEDICINE CLINIC | Facility: CLINIC | Age: 50
End: 2020-05-18
Payer: COMMERCIAL

## 2020-05-18 VITALS
SYSTOLIC BLOOD PRESSURE: 118 MMHG | BODY MASS INDEX: 22.08 KG/M2 | DIASTOLIC BLOOD PRESSURE: 60 MMHG | TEMPERATURE: 100.3 F | OXYGEN SATURATION: 97 % | HEART RATE: 67 BPM | HEIGHT: 72 IN | WEIGHT: 163 LBS

## 2020-05-18 DIAGNOSIS — Z72.0 TOBACCO USE: ICD-10-CM

## 2020-05-18 DIAGNOSIS — R91.1 LUNG NODULE: ICD-10-CM

## 2020-05-18 DIAGNOSIS — K81.9 CHOLECYSTITIS: Primary | ICD-10-CM

## 2020-05-18 PROBLEM — R53.83 OTHER FATIGUE: Status: RESOLVED | Noted: 2019-03-01 | Resolved: 2020-05-18

## 2020-05-18 PROBLEM — J01.00 SUBACUTE MAXILLARY SINUSITIS: Status: RESOLVED | Noted: 2019-11-19 | Resolved: 2020-05-18

## 2020-05-18 PROCEDURE — 99496 TRANSJ CARE MGMT HIGH F2F 7D: CPT | Performed by: INTERNAL MEDICINE

## 2020-05-18 PROCEDURE — 3008F BODY MASS INDEX DOCD: CPT | Performed by: INTERNAL MEDICINE

## 2020-05-18 PROCEDURE — 1111F DSCHRG MED/CURRENT MED MERGE: CPT | Performed by: INTERNAL MEDICINE

## 2020-05-18 RX ORDER — NICOTINE 21 MG/24HR
1 PATCH, TRANSDERMAL 24 HOURS TRANSDERMAL EVERY 24 HOURS
Qty: 28 PATCH | Refills: 0 | Status: SHIPPED | OUTPATIENT
Start: 2020-05-18 | End: 2021-08-10 | Stop reason: ALTCHOICE

## 2020-05-19 ENCOUNTER — APPOINTMENT (OUTPATIENT)
Dept: LAB | Facility: MEDICAL CENTER | Age: 50
End: 2020-05-19
Payer: COMMERCIAL

## 2020-05-19 DIAGNOSIS — K80.12 CALCULUS OF GALLBLADDER WITH ACUTE AND CHRONIC CHOLECYSTITIS WITHOUT OBSTRUCTION: ICD-10-CM

## 2020-05-19 DIAGNOSIS — R74.01 TRANSAMINITIS: ICD-10-CM

## 2020-05-19 LAB
ALBUMIN SERPL BCP-MCNC: 3.3 G/DL (ref 3.5–5)
ALP SERPL-CCNC: 184 U/L (ref 46–116)
ALT SERPL W P-5'-P-CCNC: 121 U/L (ref 12–78)
ANION GAP SERPL CALCULATED.3IONS-SCNC: 4 MMOL/L (ref 4–13)
AST SERPL W P-5'-P-CCNC: 33 U/L (ref 5–45)
BILIRUB SERPL-MCNC: 0.85 MG/DL (ref 0.2–1)
BUN SERPL-MCNC: 10 MG/DL (ref 5–25)
CALCIUM SERPL-MCNC: 8.9 MG/DL (ref 8.3–10.1)
CHLORIDE SERPL-SCNC: 106 MMOL/L (ref 100–108)
CO2 SERPL-SCNC: 28 MMOL/L (ref 21–32)
CREAT SERPL-MCNC: 0.89 MG/DL (ref 0.6–1.3)
GFR SERPL CREATININE-BSD FRML MDRD: 99 ML/MIN/1.73SQ M
GLUCOSE SERPL-MCNC: 128 MG/DL (ref 65–140)
POTASSIUM SERPL-SCNC: 4.1 MMOL/L (ref 3.5–5.3)
PROT SERPL-MCNC: 7.2 G/DL (ref 6.4–8.2)
SODIUM SERPL-SCNC: 138 MMOL/L (ref 136–145)

## 2020-05-19 PROCEDURE — 36415 COLL VENOUS BLD VENIPUNCTURE: CPT

## 2020-05-19 PROCEDURE — 80053 COMPREHEN METABOLIC PANEL: CPT

## 2020-05-20 ENCOUNTER — TELEPHONE (OUTPATIENT)
Dept: INTERNAL MEDICINE CLINIC | Facility: CLINIC | Age: 50
End: 2020-05-20

## 2020-05-20 DIAGNOSIS — K81.9 CHOLECYSTITIS: Primary | ICD-10-CM

## 2020-05-27 DIAGNOSIS — K21.9 GASTROESOPHAGEAL REFLUX DISEASE, ESOPHAGITIS PRESENCE NOT SPECIFIED: ICD-10-CM

## 2020-05-27 RX ORDER — OMEPRAZOLE 40 MG/1
CAPSULE, DELAYED RELEASE ORAL
Qty: 90 CAPSULE | Refills: 0 | Status: SHIPPED | OUTPATIENT
Start: 2020-05-27 | End: 2020-11-12 | Stop reason: SDUPTHER

## 2020-07-08 DIAGNOSIS — F51.01 PRIMARY INSOMNIA: ICD-10-CM

## 2020-07-08 RX ORDER — TRAZODONE HYDROCHLORIDE 50 MG/1
100 TABLET ORAL
Qty: 90 TABLET | Refills: 3 | Status: SHIPPED | OUTPATIENT
Start: 2020-07-08 | End: 2021-01-19 | Stop reason: SDUPTHER

## 2020-08-10 ENCOUNTER — HOSPITAL ENCOUNTER (OUTPATIENT)
Dept: CT IMAGING | Facility: HOSPITAL | Age: 50
Discharge: HOME | End: 2020-08-10
Attending: INTERNAL MEDICINE
Payer: COMMERCIAL

## 2020-08-10 DIAGNOSIS — R91.1 LUNG NODULE, SOLITARY: ICD-10-CM

## 2020-08-10 PROCEDURE — 71250 CT THORAX DX C-: CPT

## 2020-08-21 NOTE — RESULT ENCOUNTER NOTE
Reviewed  Missed/cancelled 8/14 20 appointment  [facility]   Messaged [facility] staff for contact to reschedule

## 2020-08-27 ENCOUNTER — TELEPHONE (OUTPATIENT)
Dept: INTERNAL MEDICINE CLINIC | Facility: CLINIC | Age: 50
End: 2020-08-27

## 2020-08-27 DIAGNOSIS — I10 ESSENTIAL HYPERTENSION: Primary | ICD-10-CM

## 2020-08-27 RX ORDER — AMLODIPINE BESYLATE 5 MG/1
5 TABLET ORAL DAILY
Qty: 30 TABLET | Refills: 5 | Status: SHIPPED | OUTPATIENT
Start: 2020-08-27 | End: 2021-04-21 | Stop reason: SDUPTHER

## 2020-08-27 NOTE — TELEPHONE ENCOUNTER
bp last night 180/104 and today 142/90, last week has a ringing in his ear  Patient no longer on BP medication, asking if he should resume? Next appt with you is oct

## 2020-08-31 RX ORDER — IBUPROFEN 800 MG/1
TABLET ORAL EVERY 6 HOURS PRN
Status: ON HOLD | COMMUNITY
End: 2021-06-10 | Stop reason: SDUPTHER

## 2020-09-02 ENCOUNTER — OFFICE VISIT (OUTPATIENT)
Dept: PULMONOLOGY | Facility: CLINIC | Age: 50
End: 2020-09-02
Payer: COMMERCIAL

## 2020-09-02 VITALS
OXYGEN SATURATION: 95 % | WEIGHT: 161 LBS | SYSTOLIC BLOOD PRESSURE: 140 MMHG | BODY MASS INDEX: 21.81 KG/M2 | DIASTOLIC BLOOD PRESSURE: 82 MMHG | HEIGHT: 72 IN | HEART RATE: 71 BPM

## 2020-09-02 DIAGNOSIS — R91.8 MULTIPLE LUNG NODULES: ICD-10-CM

## 2020-09-02 DIAGNOSIS — F17.210 CIGARETTE NICOTINE DEPENDENCE WITHOUT COMPLICATION: ICD-10-CM

## 2020-09-02 DIAGNOSIS — R91.1 LUNG NODULE: Primary | ICD-10-CM

## 2020-09-02 PROCEDURE — 99214 OFFICE O/P EST MOD 30 MIN: CPT | Performed by: INTERNAL MEDICINE

## 2020-09-02 PROCEDURE — 99406 BEHAV CHNG SMOKING 3-10 MIN: CPT | Performed by: INTERNAL MEDICINE

## 2020-09-02 PROCEDURE — 3079F DIAST BP 80-89 MM HG: CPT | Performed by: INTERNAL MEDICINE

## 2020-09-02 NOTE — PROGRESS NOTES
Progress note - Pulmonary Medicine   Andressa Leavitt III 44 y o  male MRN: [id number]       Impression & Plan:     Multiple lung nodules  · He has waxing and waning pulmonary nodules  The most concerning nodules in the right upper lobe and is predominantly ground-glass density  It is unchanged in the interval since his last 2 CT scans  · Recommended six-month interval follow-up  Cigarette nicotine dependence without complication  · Continues to be a heavy smoker  · We discussed his tobacco use  It is essential that he quits smoking  · He has quit for an extended period of time in the past   Currently does not have the desire to quit  · Tobacco cessation counseling time 3 minutes    I will see Ary Megn back in the office in 6 months  This will be after the follow-up CT scan in mid February  ______________________________________________________________________    HPI:    Andressa Leavitt III presents today for follow-up of abnormal CT scan of the chest and ongoing tobacco abuse  He continues to smoke 1 and half packs per day of cigarettes  He recognizes that he needs to quit smoking but has not felt ready to quit  He still enjoys smoking  He has quit in the past for an extended period of time, up to 12 years  He return to smoking about 2 years ago  Currently he has follow-up for abnormal CT scan of the chest   He recently had a scan in August   He had had a previous scan in May during about of cholecystitis  He also had previous six-month CT scan in January 2020  He has had waxing and waning changes on this  No appetite or weight changes  No increasing cough  No wheezing  No fever, chills, or infectious symptoms  No sick contacts  No headache, lightheadedness, or dizziness  No chest pain, pleurisy, or cardiac complaints  He is currently on disability  Aside from his tobacco use, he does not report any additional exposures    He did have rate on in his home which was identified at the time of construction  A Radon rim ED a shin system was installed immediately  He does not have substantial Radon exposure  Current Medications:    Current Outpatient Medications:     amLODIPine (NORVASC) 5 mg tablet, Take 1 tablet (5 mg total) by mouth daily, Disp: 30 tablet, Rfl: 5    ibuprofen (MOTRIN) 800 mg tablet, Take by mouth, Disp: , Rfl:     Multiple Vitamins-Minerals (MULTI COMPLETE) CAPS, Take by mouth, Disp: , Rfl:     nicotine (NICODERM CQ) 21 mg/24 hr TD 24 hr patch, Place 1 patch on the skin every 24 hours, Disp: 28 patch, Rfl: 0    omeprazole (PriLOSEC) 40 MG capsule, Take 1 capsule (40 mg total) by mouth daily, Disp: 90 capsule, Rfl: 0    traZODone (DESYREL) 50 mg tablet, Take 2 tablets (100 mg total) by mouth daily at bedtime, Disp: 90 tablet, Rfl: 3    Review of Systems:  Aside from what is mentioned in the HPI, the review of systems is otherwise negative    Past medical history, surgical history, and family history were reviewed and updated as appropriate    Social history updates:  Social History     Tobacco Use   Smoking Status Current Every Day Smoker    Packs/day: 1 50    Years: 17 00    Pack years: 25 50    Types: Cigarettes   Smokeless Tobacco Never Used   Tobacco Comment    currently for 2 years, had quit for 12 years prior       PhysicalExamination:  Vitals:   /82 (BP Location: Left arm, Patient Position: Sitting)   Pulse 71   Ht 6' (1 829 m)   Wt 73 kg (161 lb)   SpO2 95%   BMI 21 84 kg/m²   Gen: Comfortable on room air  Non-labored breathing  HEENT: PERRL  O/P: clear  moist  Neck: Trachea is midline  No JVD  No adenopathy  Chest:  Symmetric chest wall excursion with slightly distant breath sounds  Otherwise clear to auscultation  Cardiac:  Regular  no murmur  Abdomen: Soft and nontender  Bowel sounds are present  Extremities: No edema    Diagnostic Data:  Labs:   I personally reviewed the most recent laboratory data pertinent to today's visit    Lab Results Component Value Date    WBC 13 03 (H) 05/16/2020    HGB 13 4 05/16/2020    HCT 41 1 05/16/2020    MCV 95 05/16/2020     05/16/2020     Lab Results   Component Value Date    SODIUM 138 05/19/2020    K 4 1 05/19/2020    CO2 28 05/19/2020     05/19/2020    BUN 10 05/19/2020    CREATININE 0 89 05/19/2020    CALCIUM 8 9 05/19/2020       Imaging:  I personally reviewed the images on the [facility] system pertinent to today's visit  CT scan of the chest from August 2020 was compared with a study from May 2020 and prior from January 2020  He does have waxing and waning lung nodules  There are a few small new lung nodules  Other nodules are stable, resolved    The largest nodule is in the right upper lobe which is a ground-glass predominant nodule which has not changed in 6 months      Carrie Cancino MD

## 2020-09-02 NOTE — ASSESSMENT & PLAN NOTE
· Continues to be a heavy smoker  · We discussed his tobacco use  It is essential that he quits smoking    · He has quit for an extended period of time in the past   Currently does not have the desire to quit  · Tobacco cessation counseling time 3 minutes

## 2020-09-02 NOTE — ASSESSMENT & PLAN NOTE
· He has waxing and waning pulmonary nodules  The most concerning nodules in the right upper lobe and is predominantly ground-glass density  It is unchanged in the interval since his last 2 CT scans  · Recommended six-month interval follow-up

## 2020-10-05 ENCOUNTER — OFFICE VISIT (OUTPATIENT)
Dept: INTERNAL MEDICINE CLINIC | Facility: CLINIC | Age: 50
End: 2020-10-05
Payer: COMMERCIAL

## 2020-10-05 VITALS
OXYGEN SATURATION: 98 % | DIASTOLIC BLOOD PRESSURE: 78 MMHG | BODY MASS INDEX: 22.09 KG/M2 | WEIGHT: 163.13 LBS | SYSTOLIC BLOOD PRESSURE: 124 MMHG | HEIGHT: 72 IN | HEART RATE: 80 BPM | TEMPERATURE: 98.5 F

## 2020-10-05 DIAGNOSIS — Z72.0 TOBACCO USE: ICD-10-CM

## 2020-10-05 DIAGNOSIS — R91.8 MULTIPLE LUNG NODULES: ICD-10-CM

## 2020-10-05 DIAGNOSIS — Z00.00 MEDICARE ANNUAL WELLNESS VISIT, SUBSEQUENT: Primary | ICD-10-CM

## 2020-10-05 DIAGNOSIS — Z12.5 SCREENING FOR PROSTATE CANCER: ICD-10-CM

## 2020-10-05 DIAGNOSIS — E78.2 MIXED HYPERLIPIDEMIA: ICD-10-CM

## 2020-10-05 PROCEDURE — G0439 PPPS, SUBSEQ VISIT: HCPCS | Performed by: INTERNAL MEDICINE

## 2020-10-05 PROCEDURE — 4004F PT TOBACCO SCREEN RCVD TLK: CPT | Performed by: INTERNAL MEDICINE

## 2020-10-05 PROCEDURE — 3725F SCREEN DEPRESSION PERFORMED: CPT | Performed by: INTERNAL MEDICINE

## 2020-11-12 DIAGNOSIS — K21.9 GASTROESOPHAGEAL REFLUX DISEASE: ICD-10-CM

## 2020-11-12 RX ORDER — OMEPRAZOLE 40 MG/1
40 CAPSULE, DELAYED RELEASE ORAL DAILY
Qty: 90 CAPSULE | Refills: 3 | Status: SHIPPED | OUTPATIENT
Start: 2020-11-12 | End: 2021-04-21 | Stop reason: SDUPTHER

## 2020-12-31 ENCOUNTER — TELEPHONE (OUTPATIENT)
Dept: SURGERY | Facility: CLINIC | Age: 50
End: 2020-12-31

## 2021-01-19 DIAGNOSIS — F51.01 PRIMARY INSOMNIA: ICD-10-CM

## 2021-01-19 RX ORDER — TRAZODONE HYDROCHLORIDE 50 MG/1
100 TABLET ORAL
Qty: 90 TABLET | Refills: 3 | Status: SHIPPED | OUTPATIENT
Start: 2021-01-19 | End: 2021-12-22 | Stop reason: SDUPTHER

## 2021-02-11 ENCOUNTER — HOSPITAL ENCOUNTER (OUTPATIENT)
Dept: CT IMAGING | Facility: HOSPITAL | Age: 51
Discharge: HOME | End: 2021-02-11
Attending: INTERNAL MEDICINE
Payer: COMMERCIAL

## 2021-02-11 DIAGNOSIS — R91.1 LUNG NODULE: ICD-10-CM

## 2021-02-11 PROCEDURE — G1004 CDSM NDSC: HCPCS

## 2021-02-11 PROCEDURE — 71250 CT THORAX DX C-: CPT

## 2021-02-17 ENCOUNTER — TELEPHONE (OUTPATIENT)
Dept: PULMONOLOGY | Facility: CLINIC | Age: 51
End: 2021-02-17

## 2021-02-22 ENCOUNTER — TELEPHONE (OUTPATIENT)
Dept: INTERNAL MEDICINE CLINIC | Facility: CLINIC | Age: 51
End: 2021-02-22

## 2021-02-22 DIAGNOSIS — F10.11 H/O ETOH ABUSE: Primary | ICD-10-CM

## 2021-02-22 RX ORDER — DISULFIRAM 250 MG/1
250 TABLET ORAL DAILY
Qty: 14 TABLET | Refills: 0 | Status: SHIPPED | OUTPATIENT
Start: 2021-02-22 | End: 2021-04-05 | Stop reason: SINTOL

## 2021-02-22 NOTE — PROGRESS NOTES
Spoke with pt He requested Rx for Antabuse - he did drink in recent past and does not want to resume so is requesting Antabuse at least for couple weeks to deter him from drinking    Rx sent to [facility] per pt request

## 2021-02-22 NOTE — TELEPHONE ENCOUNTER
Pt called and LMOM asking if Dr can please call him today  [phone]  Pt did not specify why or what he would like to discuss

## 2021-02-27 ENCOUNTER — HOSPITAL ENCOUNTER (EMERGENCY)
Facility: HOSPITAL | Age: 51
End: 2021-02-28
Attending: FAMILY MEDICINE
Payer: COMMERCIAL

## 2021-02-27 DIAGNOSIS — Z78.9 WITHDRAWN FROM ALCOHOL DETOXIFICATION PROGRAM: ICD-10-CM

## 2021-02-27 DIAGNOSIS — F10.929 ALCOHOL INTOXICATION (HCC): Primary | ICD-10-CM

## 2021-02-27 LAB
AMPHETAMINES SERPL QL SCN: NEGATIVE
BARBITURATES UR QL: NEGATIVE
BENZODIAZ UR QL: NEGATIVE
BILIRUB UR QL STRIP: NEGATIVE
CLARITY UR: CLEAR
COCAINE UR QL: NEGATIVE
COLOR UR: YELLOW
ETHANOL SERPL-MCNC: 227.8 MG/DL
FLUAV RNA RESP QL NAA+PROBE: NEGATIVE
FLUBV RNA RESP QL NAA+PROBE: NEGATIVE
GLUCOSE UR STRIP-MCNC: NEGATIVE MG/DL
HGB UR QL STRIP.AUTO: NEGATIVE
KETONES UR STRIP-MCNC: NEGATIVE MG/DL
LEUKOCYTE ESTERASE UR QL STRIP: NEGATIVE
METHADONE UR QL: NEGATIVE
NITRITE UR QL STRIP: NEGATIVE
OPIATES UR QL SCN: NEGATIVE
OXYCODONE+OXYMORPHONE UR QL SCN: NEGATIVE
PCP UR QL: NEGATIVE
PH UR STRIP.AUTO: 7 [PH]
PROT UR STRIP-MCNC: NEGATIVE MG/DL
RSV RNA RESP QL NAA+PROBE: NEGATIVE
SARS-COV-2 RNA RESP QL NAA+PROBE: POSITIVE
SP GR UR STRIP.AUTO: <=1.005 (ref 1–1.03)
THC UR QL: NEGATIVE
UROBILINOGEN UR QL STRIP.AUTO: 0.2 E.U./DL

## 2021-02-27 PROCEDURE — 99285 EMERGENCY DEPT VISIT HI MDM: CPT | Performed by: FAMILY MEDICINE

## 2021-02-27 PROCEDURE — 36415 COLL VENOUS BLD VENIPUNCTURE: CPT | Performed by: FAMILY MEDICINE

## 2021-02-27 PROCEDURE — 0241U HB NFCT DS VIR RESP RNA 4 TRGT: CPT | Performed by: FAMILY MEDICINE

## 2021-02-27 PROCEDURE — 80307 DRUG TEST PRSMV CHEM ANLYZR: CPT | Performed by: FAMILY MEDICINE

## 2021-02-27 PROCEDURE — 82077 ASSAY SPEC XCP UR&BREATH IA: CPT | Performed by: FAMILY MEDICINE

## 2021-02-27 PROCEDURE — 96374 THER/PROPH/DIAG INJ IV PUSH: CPT

## 2021-02-27 PROCEDURE — 96376 TX/PRO/DX INJ SAME DRUG ADON: CPT

## 2021-02-27 PROCEDURE — 99285 EMERGENCY DEPT VISIT HI MDM: CPT

## 2021-02-27 PROCEDURE — 81003 URINALYSIS AUTO W/O SCOPE: CPT | Performed by: FAMILY MEDICINE

## 2021-02-27 PROCEDURE — 96361 HYDRATE IV INFUSION ADD-ON: CPT

## 2021-02-27 RX ORDER — LORAZEPAM 2 MG/ML
1 INJECTION INTRAMUSCULAR ONCE
Status: COMPLETED | OUTPATIENT
Start: 2021-02-27 | End: 2021-02-27

## 2021-02-27 RX ORDER — CHLORDIAZEPOXIDE HYDROCHLORIDE 25 MG/1
25 CAPSULE, GELATIN COATED ORAL ONCE
Status: COMPLETED | OUTPATIENT
Start: 2021-02-27 | End: 2021-02-27

## 2021-02-27 RX ORDER — LORAZEPAM 2 MG/ML
2 INJECTION INTRAMUSCULAR ONCE
Status: COMPLETED | OUTPATIENT
Start: 2021-02-27 | End: 2021-02-27

## 2021-02-27 RX ORDER — LORAZEPAM 2 MG/ML
1 INJECTION INTRAMUSCULAR ONCE
Status: DISCONTINUED | OUTPATIENT
Start: 2021-02-27 | End: 2021-02-27

## 2021-02-27 RX ORDER — NICOTINE 21 MG/24HR
21 PATCH, TRANSDERMAL 24 HOURS TRANSDERMAL ONCE
Status: DISCONTINUED | OUTPATIENT
Start: 2021-02-27 | End: 2021-02-28 | Stop reason: HOSPADM

## 2021-02-27 RX ORDER — LORAZEPAM 1 MG/1
1 TABLET ORAL ONCE
Status: COMPLETED | OUTPATIENT
Start: 2021-02-27 | End: 2021-02-27

## 2021-02-27 RX ADMIN — LORAZEPAM 2 MG: 2 INJECTION INTRAMUSCULAR; INTRAVENOUS at 17:26

## 2021-02-27 RX ADMIN — LORAZEPAM 1 MG: 1 TABLET ORAL at 22:11

## 2021-02-27 RX ADMIN — CHLORDIAZEPOXIDE HYDROCHLORIDE 25 MG: 25 CAPSULE ORAL at 23:17

## 2021-02-27 RX ADMIN — SODIUM CHLORIDE 1000 ML: 0.9 INJECTION, SOLUTION INTRAVENOUS at 11:16

## 2021-02-27 RX ADMIN — SODIUM CHLORIDE 1000 ML: 0.9 INJECTION, SOLUTION INTRAVENOUS at 18:17

## 2021-02-27 RX ADMIN — LORAZEPAM 1 MG: 2 INJECTION INTRAMUSCULAR; INTRAVENOUS at 11:23

## 2021-02-27 RX ADMIN — NICOTINE 21 MG: 21 PATCH, EXTENDED RELEASE TRANSDERMAL at 11:57

## 2021-02-27 RX ADMIN — LORAZEPAM 2 MG: 2 INJECTION INTRAMUSCULAR; INTRAVENOUS at 14:38

## 2021-02-27 RX ADMIN — CHLORDIAZEPOXIDE HYDROCHLORIDE 25 MG: 25 CAPSULE ORAL at 18:17

## 2021-02-27 NOTE — ED NOTES
Pt  Requesting to leave if he can not smoke a cigarette  We discussed the No-Smoking policy and agreed upon only one cigarette at this time  I walked pt  Outside and remained with pt  For his last cigarette  I instructed that he will not be allowed to go outside anymore until [facility] picks him up and he was agreeable to same  Pt  States he has smoked for so long, he just can't stop and the nicotine patch is not working for him  Bedside nurse aware of same       Antonio Aguero RN  02/27/21 6930

## 2021-02-27 NOTE — ED NOTES
Called and left a voicemail at [facility] D&A to complete warm handoff      JK-CIS

## 2021-02-27 NOTE — ED NOTES
Patient on phone with Ana Laura to complete assessment for placement to [address] [place], [place] [address]rachael Cifuentes
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
## 2021-02-28 VITALS
RESPIRATION RATE: 18 BRPM | OXYGEN SATURATION: 99 % | HEART RATE: 77 BPM | BODY MASS INDEX: 21.97 KG/M2 | WEIGHT: 162 LBS | DIASTOLIC BLOOD PRESSURE: 93 MMHG | TEMPERATURE: 99.2 F | SYSTOLIC BLOOD PRESSURE: 140 MMHG

## 2021-02-28 RX ORDER — ONDANSETRON 4 MG/1
4 TABLET, ORALLY DISINTEGRATING ORAL ONCE
Status: COMPLETED | OUTPATIENT
Start: 2021-02-28 | End: 2021-02-28

## 2021-02-28 RX ORDER — LORAZEPAM 1 MG/1
2 TABLET ORAL ONCE
Status: COMPLETED | OUTPATIENT
Start: 2021-02-28 | End: 2021-02-28

## 2021-02-28 RX ORDER — ONDANSETRON HYDROCHLORIDE 4 MG/5ML
4 SOLUTION ORAL ONCE
Status: DISCONTINUED | OUTPATIENT
Start: 2021-02-28 | End: 2021-02-28

## 2021-02-28 RX ADMIN — ONDANSETRON 4 MG: 4 TABLET, ORALLY DISINTEGRATING ORAL at 06:42

## 2021-02-28 RX ADMIN — LORAZEPAM 2 MG: 1 TABLET ORAL at 06:42

## 2021-02-28 NOTE — ED NOTES
Crisis spoke to Anam Booth at [facility] in [facility] who stated this pt has been accepted to their program, however, they are unable to have him p/u until morning  Pt is scheduled for p/u at 07:30am  The ED RN station phone number was provided in case any changes to this time are made between the hrs of 12a to 8a  Crisis spoke to the pt and informed him of same, he has asked a family member to bring him clothing but is aware he needs to remain here until he is p/u

## 2021-02-28 NOTE — ED NOTES
Pt is sleeping  Has drinks and snacks at bedside   Call bell within reach     Angie Reyna RN  02/28/21 7475

## 2021-04-02 DIAGNOSIS — R52 GENERALIZED PAIN: Primary | ICD-10-CM

## 2021-04-02 RX ORDER — MELOXICAM 7.5 MG/1
7.5 TABLET ORAL DAILY
Qty: 15 TABLET | Refills: 0 | Status: SHIPPED | OUTPATIENT
Start: 2021-04-02 | End: 2021-05-10

## 2021-04-02 NOTE — TELEPHONE ENCOUNTER
Pt called with pain in his back, hands and hip   He isnt sure what its from but asked for an anti-inflamatory

## 2021-04-05 ENCOUNTER — OFFICE VISIT (OUTPATIENT)
Dept: INTERNAL MEDICINE CLINIC | Facility: CLINIC | Age: 51
End: 2021-04-05
Payer: COMMERCIAL

## 2021-04-05 VITALS
DIASTOLIC BLOOD PRESSURE: 78 MMHG | OXYGEN SATURATION: 97 % | WEIGHT: 161.25 LBS | HEIGHT: 72 IN | SYSTOLIC BLOOD PRESSURE: 124 MMHG | BODY MASS INDEX: 21.84 KG/M2 | TEMPERATURE: 97.9 F | HEART RATE: 75 BPM

## 2021-04-05 DIAGNOSIS — F41.9 ANXIETY: ICD-10-CM

## 2021-04-05 DIAGNOSIS — F33.41 RECURRENT MAJOR DEPRESSIVE DISORDER, IN PARTIAL REMISSION (HCC): ICD-10-CM

## 2021-04-05 DIAGNOSIS — F17.210 CIGARETTE NICOTINE DEPENDENCE WITHOUT COMPLICATION: ICD-10-CM

## 2021-04-05 DIAGNOSIS — R91.8 MULTIPLE LUNG NODULES: Primary | ICD-10-CM

## 2021-04-05 PROBLEM — F10.14 ALCOHOL ABUSE WITH ALCOHOL-INDUCED MOOD DISORDER (HCC): Status: ACTIVE | Noted: 2021-03-15

## 2021-04-05 PROBLEM — F10.14 ALCOHOL ABUSE WITH ALCOHOL-INDUCED MOOD DISORDER (HCC): Status: RESOLVED | Noted: 2021-03-15 | Resolved: 2021-04-05

## 2021-04-05 PROBLEM — R50.9 FEVER: Status: RESOLVED | Noted: 2020-03-27 | Resolved: 2021-04-05

## 2021-04-05 PROBLEM — R93.89 ABNORMAL FINDING OF DIAGNOSTIC IMAGING: Status: RESOLVED | Noted: 2019-04-15 | Resolved: 2021-04-05

## 2021-04-05 PROCEDURE — 99214 OFFICE O/P EST MOD 30 MIN: CPT | Performed by: INTERNAL MEDICINE

## 2021-04-05 RX ORDER — TOPIRAMATE 50 MG/1
50 TABLET, FILM COATED ORAL 2 TIMES DAILY
COMMUNITY
Start: 2021-03-23 | End: 2021-07-14 | Stop reason: SDUPTHER
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
## 2021-04-06 DIAGNOSIS — Z23 ENCOUNTER FOR IMMUNIZATION: ICD-10-CM

## 2021-04-13 ENCOUNTER — OFFICE VISIT (OUTPATIENT)
Dept: PULMONOLOGY | Facility: CLINIC | Age: 51
End: 2021-04-13
Payer: COMMERCIAL

## 2021-04-13 VITALS
HEART RATE: 64 BPM | DIASTOLIC BLOOD PRESSURE: 74 MMHG | TEMPERATURE: 98.6 F | WEIGHT: 163.4 LBS | HEIGHT: 72 IN | SYSTOLIC BLOOD PRESSURE: 120 MMHG | BODY MASS INDEX: 22.13 KG/M2 | OXYGEN SATURATION: 96 %

## 2021-04-13 DIAGNOSIS — F17.210 CIGARETTE NICOTINE DEPENDENCE WITHOUT COMPLICATION: ICD-10-CM

## 2021-04-13 DIAGNOSIS — D38.1 NEOPLASM OF UNCERTAIN BEHAVIOR OF RIGHT UPPER LOBE OF LUNG: Primary | ICD-10-CM

## 2021-04-13 PROBLEM — J43.2 CENTRILOBULAR EMPHYSEMA (HCC): Status: ACTIVE | Noted: 2021-04-13

## 2021-04-13 PROCEDURE — 99214 OFFICE O/P EST MOD 30 MIN: CPT | Performed by: PHYSICIAN ASSISTANT

## 2021-04-13 PROCEDURE — 99406 BEHAV CHNG SMOKING 3-10 MIN: CPT | Performed by: PHYSICIAN ASSISTANT

## 2021-04-13 NOTE — ASSESSMENT & PLAN NOTE
Check pulmonary function tests for chronic obstruction and  diffusion impairment  If indicated would start inhalers

## 2021-04-13 NOTE — ASSESSMENT & PLAN NOTE
Smoking cessation discussed for 7 minutes today in the office  Patient states he can quit any time he wants  He will  nicotine replacement patches at 21 mg from [facility] and quit smoking now  I offered additional help if he needs  Also recommended smoking cessation program but patient denied at this time
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
## 2021-04-13 NOTE — ASSESSMENT & PLAN NOTE
Reviewed the results of CT chest with patient, patient's wife, and patient's daughter  I reviewed findings with them on the PACs system  Given strong family history of lung cancer and personal smoking history, malignancy is certainly in the differential despite previous PET negativity  Biopsy would be ideal  A IR biopsy would likely have low yield and high risk for complication  Recommend referral to thoracic surgery for possible VATS  Would also recommend obtaining PFTs prior

## 2021-04-14 ENCOUNTER — TELEPHONE (OUTPATIENT)
Dept: CARDIAC SURGERY | Facility: CLINIC | Age: 51
End: 2021-04-14

## 2021-04-21 DIAGNOSIS — K21.9 GASTROESOPHAGEAL REFLUX DISEASE: ICD-10-CM

## 2021-04-21 DIAGNOSIS — I10 ESSENTIAL HYPERTENSION: ICD-10-CM

## 2021-04-21 RX ORDER — AMLODIPINE BESYLATE 5 MG/1
5 TABLET ORAL DAILY
Qty: 30 TABLET | Refills: 5 | Status: SHIPPED | OUTPATIENT
Start: 2021-04-21 | End: 2021-07-14 | Stop reason: SDUPTHER

## 2021-04-21 RX ORDER — OMEPRAZOLE 40 MG/1
40 CAPSULE, DELAYED RELEASE ORAL DAILY
Qty: 90 CAPSULE | Refills: 3 | Status: SHIPPED | OUTPATIENT
Start: 2021-04-21 | End: 2021-11-22

## 2021-04-22 ENCOUNTER — HOSPITAL ENCOUNTER (OUTPATIENT)
Dept: PULMONOLOGY | Facility: HOSPITAL | Age: 51
Discharge: HOME | End: 2021-04-22
Payer: COMMERCIAL

## 2021-04-22 DIAGNOSIS — D38.1 NEOPLASM OF UNCERTAIN BEHAVIOR OF RIGHT UPPER LOBE OF LUNG: ICD-10-CM

## 2021-04-22 PROCEDURE — 94060 EVALUATION OF WHEEZING: CPT | Performed by: INTERNAL MEDICINE

## 2021-04-22 PROCEDURE — 94729 DIFFUSING CAPACITY: CPT | Performed by: INTERNAL MEDICINE

## 2021-04-22 PROCEDURE — 94729 DIFFUSING CAPACITY: CPT

## 2021-04-22 PROCEDURE — 94760 N-INVAS EAR/PLS OXIMETRY 1: CPT

## 2021-04-22 PROCEDURE — 94727 GAS DIL/WSHOT DETER LNG VOL: CPT

## 2021-04-22 PROCEDURE — 94060 EVALUATION OF WHEEZING: CPT

## 2021-04-22 PROCEDURE — 94727 GAS DIL/WSHOT DETER LNG VOL: CPT | Performed by: INTERNAL MEDICINE

## 2021-04-22 RX ORDER — ALBUTEROL SULFATE 2.5 MG/3ML
2.5 SOLUTION RESPIRATORY (INHALATION) ONCE AS NEEDED
Status: COMPLETED | OUTPATIENT
Start: 2021-04-22 | End: 2021-04-22

## 2021-04-22 RX ADMIN — ALBUTEROL SULFATE 2.5 MG: 2.5 SOLUTION RESPIRATORY (INHALATION) at 08:28

## 2021-04-23 ENCOUNTER — OFFICE VISIT (OUTPATIENT)
Dept: CARDIAC SURGERY | Facility: MEDICAL CENTER | Age: 51
End: 2021-04-23
Payer: COMMERCIAL

## 2021-04-23 ENCOUNTER — TELEPHONE (OUTPATIENT)
Dept: OTHER | Facility: HOSPITAL | Age: 51
End: 2021-04-23

## 2021-04-23 VITALS
RESPIRATION RATE: 16 BRPM | DIASTOLIC BLOOD PRESSURE: 85 MMHG | OXYGEN SATURATION: 100 % | SYSTOLIC BLOOD PRESSURE: 140 MMHG | HEIGHT: 73 IN | BODY MASS INDEX: 21.66 KG/M2 | WEIGHT: 163.4 LBS | HEART RATE: 77 BPM | TEMPERATURE: 97.8 F

## 2021-04-23 DIAGNOSIS — D38.1 NEOPLASM OF UNCERTAIN BEHAVIOR OF RIGHT UPPER LOBE OF LUNG: Primary | ICD-10-CM

## 2021-04-23 DIAGNOSIS — Z72.0 TOBACCO USE: ICD-10-CM

## 2021-04-23 PROCEDURE — 3008F BODY MASS INDEX DOCD: CPT | Performed by: PHYSICIAN ASSISTANT

## 2021-04-23 PROCEDURE — 99205 OFFICE O/P NEW HI 60 MIN: CPT | Performed by: PHYSICIAN ASSISTANT

## 2021-04-23 PROCEDURE — 4004F PT TOBACCO SCREEN RCVD TLK: CPT | Performed by: PHYSICIAN ASSISTANT
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
## 2021-04-23 NOTE — ASSESSMENT & PLAN NOTE
We discussed smoking cessation today  He must quit at this time to reduce post-operative risks among other health reasons  Nicotine replacement therapy was discussed

## 2021-04-23 NOTE — TELEPHONE ENCOUNTER
Called patient reviewed results of pulmonary function test completely left message informing of this with instructions to call the office back if he has any other questions
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
## 2021-04-23 NOTE — ASSESSMENT & PLAN NOTE
Mr Froylan Barr presents for evaluation of a right upper lobe pulmonary nodule  This has been present for the last two years, and has demonstrated growth  It was not PET avid two years ago  The appearance and behavior of this is concerning for a low grade malignancy  He is a current 1 5ppd smoker and has a strong family history of lung cancer  He has some shortness of breath on exertion and a chronic cough  His PFTs are completely normal with an FEV1 91% and DLCO 101%  Dr Bev Catherine is recommending biopsy of this via navigational bronchoscopy for diagnosis  Percutaneous biopsy is not recommended due to more central location  We will order a CT chest with navigation protocol  He will return to the office after the procedure to discuss results and potential surgical resection  No blood work needed  Plan for procedure 5/11

## 2021-04-29 ENCOUNTER — HOSPITAL ENCOUNTER (OUTPATIENT)
Dept: CT IMAGING | Facility: HOSPITAL | Age: 51
Discharge: HOME | End: 2021-04-29
Payer: COMMERCIAL

## 2021-04-29 DIAGNOSIS — D38.1 NEOPLASM OF UNCERTAIN BEHAVIOR OF RIGHT UPPER LOBE OF LUNG: ICD-10-CM

## 2021-04-29 PROCEDURE — G1004 CDSM NDSC: HCPCS

## 2021-04-29 PROCEDURE — 71250 CT THORAX DX C-: CPT

## 2021-05-04 ENCOUNTER — TELEPHONE (OUTPATIENT)
Dept: SURGICAL ONCOLOGY | Facility: CLINIC | Age: 51
End: 2021-05-04
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
## 2021-05-06 RX ORDER — DOXYCYCLINE HYCLATE 100 MG/1
100 CAPSULE ORAL EVERY 12 HOURS SCHEDULED
Qty: 14 CAPSULE | Refills: 0 | Status: SHIPPED | OUTPATIENT
Start: 2021-05-06 | End: 2021-05-13

## 2021-05-06 NOTE — TELEPHONE ENCOUNTER
Pt called that he had a tick on his chest yesterday  His daughter came over and put triple antibiotic cream to try and get the tick to back out and it did a little then she pulled it and a piece broke off and it went back into his skin  He isn't sure if it all came out or not from there  He now has a bulls eye around the area and asked what to do  Bright red ring around the hole that the tick is /was in  Please advise

## 2021-05-06 NOTE — TELEPHONE ENCOUNTER
Would rx doxycycline 100mg bid for 7 days and monitor  Would clean area with peroxide and warm water and if there is anything remaining it should draw to the surface  He does not need lab for Lyme unless develops symptoms 2-3 weeks from the bite time frame

## 2021-05-10 ENCOUNTER — ANESTHESIA EVENT (OUTPATIENT)
Dept: PERIOP | Facility: HOSPITAL | Age: 51
End: 2021-05-10
Payer: COMMERCIAL

## 2021-05-10 NOTE — ANESTHESIA PREPROCEDURE EVALUATION
Procedure:  BRONCHOSCOPY NAVIGATIONAL (N/A Bronchus)  ENDOBRONCHIAL ULTRASOUND (EBUS) (N/A Bronchus)  BRONCHOSCOPY FLEXIBLE (N/A Bronchus)    Relevant Problems   ANESTHESIA (within normal limits)   (-) History of anesthesia complications      CARDIO   (+) Hyperlipidemia   (+) Hypertension      ENDO (within normal limits)      GI/HEPATIC   (+) Gastroesophageal reflux disease      /RENAL (within normal limits)      HEMATOLOGY (within normal limits)      MUSCULOSKELETAL   (+) Thoracic back pain      NEURO/PSYCH   (+) Anxiety   (+) History of colon polyps   (+) Recurrent major depressive disorder, in partial remission (HCC)      PULMONARY   (+) Centrilobular emphysema (HCC)      Other   (+) 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   (+) Tobacco use        Physical Exam    Airway    Mallampati score: II  TM Distance: >3 FB  Neck ROM: full     Dental   Comment: Upper partial plate,     Cardiovascular  Rhythm: regular, Rate: normal, Cardiovascular exam normal    Pulmonary  Pulmonary exam normal Breath sounds clear to auscultation,     Other Findings        Anesthesia Plan  ASA Score- 3     Anesthesia Type- general with ASA Monitors  Additional Monitors:   Airway Plan: ETT  Plan Factors-Exercise tolerance (METS): >4 METS  Chart reviewed  EKG reviewed  Imaging results reviewed  Existing labs reviewed  Patient summary reviewed  Patient is a current smoker  Patient smoked on day of surgery  Induction- intravenous  Postoperative Plan-   Planned trial extubation    Informed Consent- Anesthetic plan and risks discussed with patient and daughter  I personally reviewed this patient with the CRNA  Discussed and agreed on the Anesthesia Plan with the CRNA           NPO and allergies verified  Patient took amlodipine, omeprazole, and Topamax this AM     Plan:  GA    Risks and benefits of general anesthesia were discussed with the patient    Discussed risks of anesthesia including, but not limited to, the risk of dental injury, n/v, sore throat, corneal abrasions, and arrhythmias  Less common risks including MI, stroke, and death were also discussed  All questions were answered  Anesthesia consent was obtained from the patient

## 2021-05-10 NOTE — PRE-PROCEDURE INSTRUCTIONS
Pre-Surgery Instructions:   Medication Instructions    amLODIPine (NORVASC) 5 mg tablet Instructed to take per normal schedule including DOS with sips water    ibuprofen (MOTRIN) 800 mg tablet Instructed patient per Anesthesia Guidelines   Multiple Vitamins-Minerals (MULTI COMPLETE) CAPS Instructed patient per Anesthesia Guidelines   omeprazole (PriLOSEC) 40 MG capsule Instructed to take per normal schedule including DOS with sips water    topiramate (TOPAMAX) 50 MG tablet Instructed to take per normal schedule including DOS with sips water    traZODone (DESYREL) 50 mg tablet Instructed to take per normal schedule @ Bedtime    Preop Instructions per My Surgical Experience booklet,medications per anesthesia guidelines and showering instructions per [facility] protocol using an antibacterial soap reviewed  Pt  Verbalized understanding of current visitor restrictions  Instructed to avoid all ASA/NSAIDs and OTC Vit/Supp from now until after surgery  Tylenol ok prn  Pt  Verbalized an understanding of all instructions reviewed and offers no concerns at this time

## 2021-05-11 ENCOUNTER — APPOINTMENT (OUTPATIENT)
Dept: RADIOLOGY | Facility: HOSPITAL | Age: 51
End: 2021-05-11
Payer: COMMERCIAL

## 2021-05-11 ENCOUNTER — HOSPITAL ENCOUNTER (OUTPATIENT)
Facility: HOSPITAL | Age: 51
Setting detail: OUTPATIENT SURGERY
Discharge: HOME | End: 2021-05-11
Attending: THORACIC SURGERY (CARDIOTHORACIC VASCULAR SURGERY) | Admitting: THORACIC SURGERY (CARDIOTHORACIC VASCULAR SURGERY)
Payer: COMMERCIAL

## 2021-05-11 ENCOUNTER — ANESTHESIA (OUTPATIENT)
Dept: PERIOP | Facility: HOSPITAL | Age: 51
End: 2021-05-11
Payer: COMMERCIAL

## 2021-05-11 VITALS
DIASTOLIC BLOOD PRESSURE: 70 MMHG | WEIGHT: 163 LBS | HEART RATE: 76 BPM | SYSTOLIC BLOOD PRESSURE: 136 MMHG | BODY MASS INDEX: 21.6 KG/M2 | HEIGHT: 73 IN | RESPIRATION RATE: 18 BRPM | OXYGEN SATURATION: 98 % | TEMPERATURE: 97.8 F

## 2021-05-11 DIAGNOSIS — D38.1 NEOPLASM OF UNCERTAIN BEHAVIOR OF RIGHT UPPER LOBE OF LUNG: ICD-10-CM

## 2021-05-11 PROCEDURE — 88172 CYTP DX EVAL FNA 1ST EA SITE: CPT | Performed by: PATHOLOGY

## 2021-05-11 PROCEDURE — 71045 X-RAY EXAM CHEST 1 VIEW: CPT

## 2021-05-11 PROCEDURE — 31654 BRONCH EBUS IVNTJ PERPH LES: CPT | Performed by: THORACIC SURGERY (CARDIOTHORACIC VASCULAR SURGERY)

## 2021-05-11 PROCEDURE — 31653 BRONCH EBUS SAMPLNG 3/> NODE: CPT | Performed by: THORACIC SURGERY (CARDIOTHORACIC VASCULAR SURGERY)

## 2021-05-11 PROCEDURE — 88173 CYTOPATH EVAL FNA REPORT: CPT | Performed by: PATHOLOGY

## 2021-05-11 PROCEDURE — 88112 CYTOPATH CELL ENHANCE TECH: CPT | Performed by: PATHOLOGY

## 2021-05-11 PROCEDURE — 31627 NAVIGATIONAL BRONCHOSCOPY: CPT | Performed by: THORACIC SURGERY (CARDIOTHORACIC VASCULAR SURGERY)

## 2021-05-11 PROCEDURE — 31629 BRONCHOSCOPY/NEEDLE BX EACH: CPT | Performed by: THORACIC SURGERY (CARDIOTHORACIC VASCULAR SURGERY)

## 2021-05-11 RX ORDER — PROMETHAZINE HYDROCHLORIDE 25 MG/ML
12.5 INJECTION, SOLUTION INTRAMUSCULAR; INTRAVENOUS ONCE AS NEEDED
Status: DISCONTINUED | OUTPATIENT
Start: 2021-05-11 | End: 2021-05-11 | Stop reason: HOSPADM

## 2021-05-11 RX ORDER — FENTANYL CITRATE 50 UG/ML
INJECTION, SOLUTION INTRAMUSCULAR; INTRAVENOUS AS NEEDED
Status: DISCONTINUED | OUTPATIENT
Start: 2021-05-11 | End: 2021-05-11

## 2021-05-11 RX ORDER — ONDANSETRON 2 MG/ML
INJECTION INTRAMUSCULAR; INTRAVENOUS AS NEEDED
Status: DISCONTINUED | OUTPATIENT
Start: 2021-05-11 | End: 2021-05-11

## 2021-05-11 RX ORDER — DEXAMETHASONE SODIUM PHOSPHATE 4 MG/ML
INJECTION, SOLUTION INTRA-ARTICULAR; INTRALESIONAL; INTRAMUSCULAR; INTRAVENOUS; SOFT TISSUE AS NEEDED
Status: DISCONTINUED | OUTPATIENT
Start: 2021-05-11 | End: 2021-05-11

## 2021-05-11 RX ORDER — FENTANYL CITRATE/PF 50 MCG/ML
25 SYRINGE (ML) INJECTION
Status: DISCONTINUED | OUTPATIENT
Start: 2021-05-11 | End: 2021-05-11 | Stop reason: HOSPADM

## 2021-05-11 RX ORDER — ONDANSETRON 2 MG/ML
4 INJECTION INTRAMUSCULAR; INTRAVENOUS EVERY 6 HOURS PRN
Status: DISCONTINUED | OUTPATIENT
Start: 2021-05-11 | End: 2021-05-11 | Stop reason: HOSPADM

## 2021-05-11 RX ORDER — GLYCOPYRROLATE 0.2 MG/ML
INJECTION INTRAMUSCULAR; INTRAVENOUS AS NEEDED
Status: DISCONTINUED | OUTPATIENT
Start: 2021-05-11 | End: 2021-05-11

## 2021-05-11 RX ORDER — PROPOFOL 10 MG/ML
INJECTION, EMULSION INTRAVENOUS AS NEEDED
Status: DISCONTINUED | OUTPATIENT
Start: 2021-05-11 | End: 2021-05-11

## 2021-05-11 RX ORDER — ROCURONIUM BROMIDE 10 MG/ML
INJECTION, SOLUTION INTRAVENOUS AS NEEDED
Status: DISCONTINUED | OUTPATIENT
Start: 2021-05-11 | End: 2021-05-11

## 2021-05-11 RX ORDER — ONDANSETRON 2 MG/ML
4 INJECTION INTRAMUSCULAR; INTRAVENOUS ONCE AS NEEDED
Status: DISCONTINUED | OUTPATIENT
Start: 2021-05-11 | End: 2021-05-11 | Stop reason: HOSPADM

## 2021-05-11 RX ORDER — ALBUTEROL SULFATE 90 UG/1
AEROSOL, METERED RESPIRATORY (INHALATION) AS NEEDED
Status: DISCONTINUED | OUTPATIENT
Start: 2021-05-11 | End: 2021-05-11

## 2021-05-11 RX ORDER — SODIUM CHLORIDE FOR INHALATION 0.9 %
VIAL, NEBULIZER (ML) INHALATION
Status: DISCONTINUED
Start: 2021-05-11 | End: 2021-05-11 | Stop reason: HOSPADM

## 2021-05-11 RX ORDER — SODIUM CHLORIDE, SODIUM LACTATE, POTASSIUM CHLORIDE, CALCIUM CHLORIDE 600; 310; 30; 20 MG/100ML; MG/100ML; MG/100ML; MG/100ML
100 INJECTION, SOLUTION INTRAVENOUS CONTINUOUS
Status: DISCONTINUED | OUTPATIENT
Start: 2021-05-11 | End: 2021-05-11 | Stop reason: HOSPADM

## 2021-05-11 RX ORDER — ALBUTEROL SULFATE 2.5 MG/3ML
2.5 SOLUTION RESPIRATORY (INHALATION) ONCE
Status: DISCONTINUED | OUTPATIENT
Start: 2021-05-11 | End: 2021-05-11 | Stop reason: HOSPADM

## 2021-05-11 RX ORDER — SODIUM CHLORIDE, SODIUM LACTATE, POTASSIUM CHLORIDE, CALCIUM CHLORIDE 600; 310; 30; 20 MG/100ML; MG/100ML; MG/100ML; MG/100ML
75 INJECTION, SOLUTION INTRAVENOUS CONTINUOUS
Status: DISCONTINUED | OUTPATIENT
Start: 2021-05-11 | End: 2021-05-11 | Stop reason: HOSPADM

## 2021-05-11 RX ORDER — LIDOCAINE HYDROCHLORIDE 10 MG/ML
0.5 INJECTION, SOLUTION EPIDURAL; INFILTRATION; INTRACAUDAL; PERINEURAL ONCE AS NEEDED
Status: DISCONTINUED | OUTPATIENT
Start: 2021-05-11 | End: 2021-05-11 | Stop reason: HOSPADM

## 2021-05-11 RX ORDER — OXYCODONE HYDROCHLORIDE 5 MG/1
5 TABLET ORAL EVERY 4 HOURS PRN
Status: DISCONTINUED | OUTPATIENT
Start: 2021-05-11 | End: 2021-05-11 | Stop reason: HOSPADM

## 2021-05-11 RX ORDER — MIDAZOLAM HYDROCHLORIDE 2 MG/2ML
INJECTION, SOLUTION INTRAMUSCULAR; INTRAVENOUS AS NEEDED
Status: DISCONTINUED | OUTPATIENT
Start: 2021-05-11 | End: 2021-05-11

## 2021-05-11 RX ORDER — PROPOFOL 10 MG/ML
INJECTION, EMULSION INTRAVENOUS CONTINUOUS PRN
Status: DISCONTINUED | OUTPATIENT
Start: 2021-05-11 | End: 2021-05-11

## 2021-05-11 RX ORDER — EPHEDRINE SULFATE 50 MG/ML
INJECTION INTRAVENOUS AS NEEDED
Status: DISCONTINUED | OUTPATIENT
Start: 2021-05-11 | End: 2021-05-11

## 2021-05-11 RX ORDER — LIDOCAINE HYDROCHLORIDE 10 MG/ML
INJECTION, SOLUTION EPIDURAL; INFILTRATION; INTRACAUDAL; PERINEURAL AS NEEDED
Status: DISCONTINUED | OUTPATIENT
Start: 2021-05-11 | End: 2021-05-11

## 2021-05-11 RX ORDER — ACETAMINOPHEN 325 MG/1
650 TABLET ORAL EVERY 4 HOURS PRN
Status: DISCONTINUED | OUTPATIENT
Start: 2021-05-11 | End: 2021-05-11 | Stop reason: HOSPADM

## 2021-05-11 RX ADMIN — DEXAMETHASONE SODIUM PHOSPHATE 10 MG: 4 INJECTION INTRA-ARTICULAR; INTRALESIONAL; INTRAMUSCULAR; INTRAVENOUS; SOFT TISSUE at 08:03

## 2021-05-11 RX ADMIN — MIDAZOLAM 2 MG: 1 INJECTION INTRAMUSCULAR; INTRAVENOUS at 07:31

## 2021-05-11 RX ADMIN — FENTANYL CITRATE 50 MCG: 50 INJECTION INTRAMUSCULAR; INTRAVENOUS at 07:49

## 2021-05-11 RX ADMIN — EPHEDRINE SULFATE 10 MG: 50 INJECTION, SOLUTION INTRAVENOUS at 08:10

## 2021-05-11 RX ADMIN — ROCURONIUM BROMIDE 20 MG: 50 INJECTION, SOLUTION INTRAVENOUS at 08:28

## 2021-05-11 RX ADMIN — Medication 25 MCG: at 09:55

## 2021-05-11 RX ADMIN — FENTANYL CITRATE 50 MCG: 50 INJECTION INTRAMUSCULAR; INTRAVENOUS at 08:02

## 2021-05-11 RX ADMIN — PROPOFOL 100 MCG/KG/MIN: 10 INJECTION, EMULSION INTRAVENOUS at 07:52

## 2021-05-11 RX ADMIN — ONDANSETRON 4 MG: 2 INJECTION INTRAMUSCULAR; INTRAVENOUS at 08:51

## 2021-05-11 RX ADMIN — EPHEDRINE SULFATE 5 MG: 50 INJECTION, SOLUTION INTRAVENOUS at 08:08

## 2021-05-11 RX ADMIN — ALBUTEROL SULFATE 2 PUFF: 90 AEROSOL, METERED RESPIRATORY (INHALATION) at 07:43

## 2021-05-11 RX ADMIN — PROPOFOL 180 MG: 10 INJECTION, EMULSION INTRAVENOUS at 07:49

## 2021-05-11 RX ADMIN — SODIUM CHLORIDE, SODIUM LACTATE, POTASSIUM CHLORIDE, AND CALCIUM CHLORIDE: .6; .31; .03; .02 INJECTION, SOLUTION INTRAVENOUS at 07:20

## 2021-05-11 RX ADMIN — REMIFENTANIL HYDROCHLORIDE 0.2 MCG/KG/MIN: 1 INJECTION, POWDER, LYOPHILIZED, FOR SOLUTION INTRAVENOUS at 07:52

## 2021-05-11 RX ADMIN — EPHEDRINE SULFATE 10 MG: 50 INJECTION, SOLUTION INTRAVENOUS at 09:00

## 2021-05-11 RX ADMIN — GLYCOPYRROLATE 0.2 MG: 0.2 INJECTION, SOLUTION INTRAMUSCULAR; INTRAVENOUS at 08:00

## 2021-05-11 RX ADMIN — ROCURONIUM BROMIDE 50 MG: 50 INJECTION, SOLUTION INTRAVENOUS at 07:50

## 2021-05-11 RX ADMIN — ALBUTEROL SULFATE 2 PUFF: 90 AEROSOL, METERED RESPIRATORY (INHALATION) at 07:55

## 2021-05-11 RX ADMIN — ROCURONIUM BROMIDE 20 MG: 50 INJECTION, SOLUTION INTRAVENOUS at 08:02

## 2021-05-11 RX ADMIN — PHENYLEPHRINE HYDROCHLORIDE 40 MCG/MIN: 10 INJECTION INTRAVENOUS at 08:06

## 2021-05-11 RX ADMIN — SUGAMMADEX 200 MG: 100 INJECTION, SOLUTION INTRAVENOUS at 09:00

## 2021-05-11 RX ADMIN — LIDOCAINE HYDROCHLORIDE 50 MG: 10 INJECTION, SOLUTION EPIDURAL; INFILTRATION; INTRACAUDAL; PERINEURAL at 07:49

## 2021-05-11 NOTE — DISCHARGE INSTRUCTIONS
Expect a scant amount of blood in sputum    Diet as tolerated  You may drive in 24 hours and when not taking narcotics  Tylenol or ibuprofen for pain as needed call if having shortness of breath, severe throat discomfort, elevated oral temperature 101 4

## 2021-05-11 NOTE — OP NOTE
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]    Procedure(s) (LRB):  BRONCHOSCOPY NAVIGATIONAL (N/A)  ENDOBRONCHIAL ULTRASOUND (EBUS) (N/A)  BRONCHOSCOPY FLEXIBLE (N/A)  1  Endobronchial ultrasound with biopsy of 3 lymph nodes  2  Navigational bronchoscopy with transbronchial biopsy of a right upper lobe mass, transbronchial brushing of a right upper lobe mass,  3  Radial EBUS to verify mass location       Specimen(s):  ID Type Source Tests Collected by Time Destination   1 : RUL Mass FNA Lung, Right Upper Lobe FINE NEEDLE ASPIRATION Jan Beavers MD 2021 0818    2 : RUL Brushing Lung, Right Upper Lobe Bronchial Brushing PULMONARY CYTOLOGY Jan Beavers MD 2021 0824    3 : Level 10R FNA Lymph Node FINE NEEDLE ASPIRATION Jan Beavers MD 2021 6834    4 : Level 7 FNA Lymph Node FINE NEEDLE ASPIRATION Jan Beavers MD 2021 0855    5 : Level 4R FNA Lymph Node FINE NEEDLE ASPIRATION Jan Beavers MD 2021 7256        Estimated Blood Loss:   Minimal    Drains:  * No LDAs found *    Anesthesia Type:   General    Operative Indications:  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 [D451]  64-year-old male with an at least 30 pack-year smoking history and family history of lung cancer as a slowly growing 1 7 cm right upper lobe lung nodule suspicious for malignancy        Operative Findings:  Non-small cell lung cancer in the right upper lobe mass  No evidence of malignancy in lymph nodes      Complications:   None    Procedure and Technique:  After obtaining informed consent from the patient, they were transported to the operating room and placed supine on the OR table  General anesthesia was administered without issue and an endotracheal tube was placed  A formal time-out was performed at this time including patient, date of birth, intended procedure, antibiotic usage as appropriate, beta-blocker usage as appropriate and plans for specimen handling  An adult bronchoscope was inserted into the endotracheal tube and a thorough bronchoscopy was then performed examining the trachea, mainstem bronchi, sigmoid segmental and subsegmental bronchi  All mucus was suctioned out to improve visualization  There is no suspicion or identified risk for TB or other air born infectious disease  During our bronchoscopy we positioned the patient's endotracheal tube at least 4 cm above the main kaleb  The endotracheal tube was then cut just above the patient's nose and secured in place  The  Cedar endotracheal tube duffy and sheath was then attached to the endotracheal tube and secured to the bed in the appropriate position  The Cedar field sensor was then positioned on the same side as the target lesion 3 patient pads were placed in the appropriate position  We verified that these were within our field that included the main trachea  The patient's bilateral arms were then tucked and we made sure to minimize any metal within our field  Prior to our procedure the patient had undergone a planning  CT of the chest, navigational protocol with thin slices  We had previously uploaded this to a [employer] [facility] where our navigational pathway/ plan was determined  This was uploaded to our system prior to initiation of this procedure  We then started the robotic navigational bronchoscopy portion of our procedure  The robotic bronchoscope was inserted through the sheath duffy and into the endotracheal tube  We then went through our initialization/registration process     Once synched we began to navigate our to our target lesion  There was good correlation between robotic mapping and bronchoscopic imaging  With the assistance of the fused imaging and navigation plan we navigated out to our target mass  Our target lesion was in the right upper lobe  Once we identified our target lesion via navigation this  was verified with radial EBUS  Our radial EBUS image was a concentric view  Then using fluoroscopy we obtained a baseline image  The target lesion was able to be seen with fluoroscopy  Then using continuous fluoroscopy we began to take biopsies the target lesion  Multiple transbronchial biopsies were taken using the Olympus 21 gauge PeriView needle  Transbronchial needle biopsies were used to create slides that were evaluated via RISSA with our pathologist in the room  In total 6  transbronchial needle biopsies were taken at our target lesion  Finally we performed transbronchial brushing of the target lesion using the [facility] brush  A touch prep was performed on the brush and then the remainder was cut in CytoLyt  In total 2  trans bronchial brushings were performed at our target  Our specimens were immediately analyzed via RISSA pathology in the room showing evidence of non-small cell lung cancer  The remainder of the specimens were sent in cell block  Once we confirmed hemostasis the robotic bronchoscope was safely withdrawn through the airway and our robotic navigational portion of the procedure was completed at this time  The Olympus EBUS scope was then inserted and used to identify mediastinal lymph node stations  Mediastinal stations 7, 10R, 4R, 2R, 4 L, and 2 L were visualized using ultrasound guidance  Color Doppler was used as needed to identify and avoid surrounding vasculature  A 21 gauge EBUS needle was used to obtain node biopsies under direct visualization  Biopsies were obtained from stations 7, 10 R, and 4R    There were no other significant lymph nodes greater than 5 mm in size  These were immediately analyzed by RISSA cytopathology showing no evidence of malignancy  There was no significant bleeding seen from the airway  This portion of the procedure was completed at this time  An adult bronchoscope was again reintroduced into the airway and the airway was fully suctioned out  Once hemostasis was verified the patient awoke and was extubated without issue  They were transferred to the PACU in stable condition  All specimen, needle, and instrument counts were verified at the conclusion of the procedure and were correct  We ordered a chest x-ray to evaluate for a pneumothorax in the PACU          I was present for the entire procedure    Patient Disposition:  PACU     SIGNATURE: Idalmis Mcdowell MD  DATE: May 11, 2021  TIME: 12:08 PM

## 2021-05-11 NOTE — ANESTHESIA POSTPROCEDURE EVALUATION
Post-Op Assessment Note    CV Status:  Stable  Pain Score: 0    Pain management: adequate     Mental Status:  Awake and alert   Hydration Status:  Stable   PONV Controlled:  Controlled   Airway Patency:  Patent      Post Op Vitals Reviewed: Yes      Staff: CRNA         No complications documented      BP   114/54   Temp      Pulse  74   Resp   14   SpO2   99   Awake and conversing with pacu staff

## 2021-05-19 ENCOUNTER — OFFICE VISIT (OUTPATIENT)
Dept: CARDIAC SURGERY | Facility: CLINIC | Age: 51
End: 2021-05-19
Payer: COMMERCIAL

## 2021-05-19 VITALS
WEIGHT: 157.41 LBS | HEIGHT: 73 IN | TEMPERATURE: 99 F | OXYGEN SATURATION: 98 % | HEART RATE: 70 BPM | SYSTOLIC BLOOD PRESSURE: 157 MMHG | BODY MASS INDEX: 20.86 KG/M2 | DIASTOLIC BLOOD PRESSURE: 84 MMHG | RESPIRATION RATE: 16 BRPM

## 2021-05-19 DIAGNOSIS — D38.1 NEOPLASM OF UNCERTAIN BEHAVIOR OF RIGHT UPPER LOBE OF LUNG: ICD-10-CM

## 2021-05-19 DIAGNOSIS — J43.2 CENTRILOBULAR EMPHYSEMA (HCC): ICD-10-CM

## 2021-05-19 DIAGNOSIS — Z78.9 NEED FOR FOLLOW-UP BY SOCIAL WORKER: Primary | ICD-10-CM

## 2021-05-19 PROCEDURE — 99214 OFFICE O/P EST MOD 30 MIN: CPT | Performed by: PHYSICIAN ASSISTANT

## 2021-05-19 PROCEDURE — 3008F BODY MASS INDEX DOCD: CPT | Performed by: PHYSICIAN ASSISTANT

## 2021-05-19 PROCEDURE — 1036F TOBACCO NON-USER: CPT | Performed by: PHYSICIAN ASSISTANT

## 2021-05-19 RX ORDER — CEFAZOLIN SODIUM 1 G/50ML
1000 SOLUTION INTRAVENOUS ONCE
Status: CANCELLED | OUTPATIENT
Start: 2021-05-19 | End: 2021-05-19

## 2021-05-19 RX ORDER — ACETAMINOPHEN 325 MG/1
975 TABLET ORAL ONCE
Status: CANCELLED | OUTPATIENT
Start: 2021-05-19 | End: 2021-05-19

## 2021-05-19 RX ORDER — QUETIAPINE FUMARATE 25 MG/1
25 TABLET, FILM COATED ORAL
COMMUNITY
End: 2021-07-14 | Stop reason: DRUGHIGH

## 2021-05-19 RX ORDER — HEPARIN SODIUM 5000 [USP'U]/ML
5000 INJECTION, SOLUTION INTRAVENOUS; SUBCUTANEOUS
Status: CANCELLED | OUTPATIENT
Start: 2021-05-20 | End: 2021-05-21

## 2021-05-19 RX ORDER — CELECOXIB 200 MG/1
200 CAPSULE ORAL ONCE
Status: CANCELLED | OUTPATIENT
Start: 2021-05-19 | End: 2021-05-19

## 2021-05-19 RX ORDER — GABAPENTIN 300 MG/1
600 CAPSULE ORAL ONCE
Status: CANCELLED | OUTPATIENT
Start: 2021-05-19 | End: 2021-05-19

## 2021-05-19 NOTE — H&P (VIEW-ONLY)
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  We had a discussion with Mr  Gayle Barraza regarding his pathology from his navigational bronchoscopy, in which the lung mass was consistent with non small cell lung cancer  The lymph node biopsies were negative for metastatic carcinoma  We are recommending a PET scan, to ensure there is no distant disease  His last PET was in 3/2019  If there is not, this would be a clinical stage I disease and therefore Dr Musa Yadav would recommend resection, which would consist of a flexible bronchoscopy and right robotic assisted upper lobectomy with MLND  The procedure, possible risks, and post operative course were explained and all questions were answered  We will find a date in the next several weeks, since he needs to be off cigarettes for at least 2-3 weeks prior to surgery  Consent form was signed today  We will call him if there are any abnormalities on the PET scan  He will have to undergo blood work prior to surgery  Diagnoses and all orders for this visit:    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  -     Case request operating room: LOBECTOMY LUNG THORACOSCOPIC W/ ROBOTICS, BRONCHOSCOPY FLEXIBLE; Standing  -     Basic metabolic panel; Future  -     CBC and Platelet; Future  -     APTT; Future  -     Protime-INR; Future  -     PAT Covid Screening; Future  -     Type and screen; Future  -     Case request operating room: LOBECTOMY LUNG THORACOSCOPIC W/ ROBOTICS, BRONCHOSCOPY FLEXIBLE    Centrilobular emphysema ([address] )    Other orders  -     QUEtiapine (SEROquel) 25 mg tablet; Take 25 mg by mouth daily at bedtime  -     Diet NPO; Sips with meds; Standing  -     Nursing communication Please give pre-op Carbohydrate drink to patient 2-4 hours prior to surgery; Standing  -     Void on call to OR; Standing  -     Insert peripheral IV;  Standing  -     Place sequential compression device; Standing  - acetaminophen (TYLENOL) tablet 975 mg  -     gabapentin (NEURONTIN) capsule 600 mg  -     heparin (porcine) subcutaneous injection 5,000 Units  -     celecoxib (CeleBREX) capsule 200 mg  -     ceFAZolin (ANCEF) IVPB (premix in dextrose) 1,000 mg 50 mL             Thoracic History       Diagnosis: Right upper lobe non small cell lung carcinoma  Procedure: Flexible bronchoscopy, navigational bronchoscopy, and EBUS performed on 5/11/21   Pathology: right upper lobe biopsy and brushings revealed malignancy, consistent with non small cell carcinoma  10R revealed atypical cellular changes  Levels 4R and 7 were negative for carcinoma  Patient ID: Jeannette Carrasco is a 44 y o  male  ECOG 0    HPI     Mr Ector Yang is a 43 yo gentleman who we evaluated for a right upper lobe pulmonary nodule that was originally found in March 2019  A PET scan at that time had no FDG activity  A few follow up CT scans demonstrated stability, but a recent CT scan from 2/11/21 revealed some growth of the nodule, measuring 1 7 x 1 2 cm  PFT's from 4/22/21 revealed a FEV1 of 91% and a DLCO of 101% predicted  Dr Harika Canas recommended a donna bronch, which was performed on 5/11/21  He returns today to discuss the results  On discussion, he started using nicotine patches this past Sunday and quit that day  He denies shortness of breath, fever, chills,  He has lost 6 lbs in one week from anxiety  He has some chest pain, 3/10, possible due to his anxiety  He denies hemoptysis, headaches, dizziness, nausea, or vomiting  He does have a sore throat and right shoulder pain for the past several months  He also has been having a dry cough at night  The following portions of the patient's history were reviewed and updated as appropriate: allergies, current medications, past family history, past medical history, past social history, past surgical history and problem list     Review of Systems      Objective:   Physical Exam  Vitals signs reviewed  Constitutional:       General: He is not in acute distress  Appearance: Normal appearance  He is normal weight  HENT:      Head: Normocephalic and atraumatic  Mouth/Throat:      Comments: Masked   Eyes:      General: No scleral icterus  Extraocular Movements: Extraocular movements intact  Neck:      Musculoskeletal: Normal range of motion and neck supple  Cardiovascular:      Rate and Rhythm: Normal rate and regular rhythm  Pulses: Normal pulses  Heart sounds: Normal heart sounds  No murmur  Pulmonary:      Effort: Pulmonary effort is normal  No respiratory distress  Breath sounds: Normal breath sounds  Abdominal:      General: Abdomen is flat  Bowel sounds are normal  There is no distension  Palpations: Abdomen is soft  Musculoskeletal: Normal range of motion  Right lower leg: No edema  Left lower leg: No edema  Lymphadenopathy:      Cervical: No cervical adenopathy  Skin:     General: Skin is warm and dry  Neurological:      Mental Status: He is alert and oriented to person, place, and time  Motor: No weakness  Psychiatric:         Mood and Affect: Mood normal          Behavior: Behavior normal      /84   Pulse 70   Temp 99 °F (37 2 °C) (Temporal)   Resp 16   Ht 6' 1" (1 854 m)   Wt 71 4 kg (157 lb 6 5 oz)   SpO2 98%   BMI 20 77 kg/m²        Ct Chest Wo Contrast    Result Date: 5/4/2021  Narrative CT CHEST WITHOUT IV CONTRAST INDICATION:   D38 1: Neoplasm of uncertain behavior of trachea, bronchus and lung  COMPARISON:  CT scan 2/11/2021 TECHNIQUE: CT examination of the chest was performed without intravenous contrast   Axial, sagittal, and coronal 2D reformatted images were created from the source data and submitted for interpretation  Radiation dose length product (DLP) for this visit:  358 16 mGy-cm     This examination, like all CT scans performed in the [facility], was performed utilizing techniques to minimize radiation dose exposure, including the use of iterative  reconstruction and automated exposure control  FINDINGS: LUNGS:  Stable background emphysema with right upper lobe nodule measuring 1 7 x 1 3 cm image 4/20  Stable left upper lobe pleural-based nodule posteriorly measuring 7 mm on image 4/13  Stable mucoid impacted bronchus in the right middle lobe image 4/44 nothing new or suspicious  No endotracheal or endobronchial lesion  PLEURA:  Unremarkable  HEART/GREAT VESSELS:  Unremarkable for patient's age  MEDIASTINUM AND SOHA:  Unremarkable  CHEST WALL AND LOWER NECK:   Unremarkable  VISUALIZED STRUCTURES IN THE UPPER ABDOMEN:  Status post gastric bypass surgery  Status post cholecystectomy  Upper abdomen otherwise unremarkable  OSSEOUS STRUCTURES:  No acute fracture or destructive osseous lesion  Impression Stable background emphysema with right upper lobe ill-defined nodule measuring 1 7 x 1 3 cm image 4/20  Stable left upper lobe pleural-based nodule posteriorly measuring 7 mm on image 4/13  Continued follow-up recommended in one year   Workstation performed: [id number]

## 2021-05-19 NOTE — PROGRESS NOTES
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  We had a discussion with Mr  Griselda King regarding his pathology from his navigational bronchoscopy, in which the lung mass was consistent with non small cell lung cancer  The lymph node biopsies were negative for metastatic carcinoma  We are recommending a PET scan, to ensure there is no distant disease  His last PET was in 3/2019  If there is not, this would be a clinical stage I disease and therefore Dr Osito Mckay would recommend resection, which would consist of a flexible bronchoscopy and right robotic assisted upper lobectomy with MLND  The procedure, possible risks, and post operative course were explained and all questions were answered  We will find a date in the next several weeks, since he needs to be off cigarettes for at least 2-3 weeks prior to surgery  Consent form was signed today  We will call him if there are any abnormalities on the PET scan  He will have to undergo blood work prior to surgery  Diagnoses and all orders for this visit:    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  -     Case request operating room: LOBECTOMY LUNG THORACOSCOPIC W/ ROBOTICS, BRONCHOSCOPY FLEXIBLE; Standing  -     Basic metabolic panel; Future  -     CBC and Platelet; Future  -     APTT; Future  -     Protime-INR; Future  -     PAT Covid Screening; Future  -     Type and screen; Future  -     Case request operating room: LOBECTOMY LUNG THORACOSCOPIC W/ ROBOTICS, BRONCHOSCOPY FLEXIBLE    Centrilobular emphysema ([address] )    Other orders  -     QUEtiapine (SEROquel) 25 mg tablet; Take 25 mg by mouth daily at bedtime  -     Diet NPO; Sips with meds; Standing  -     Nursing communication Please give pre-op Carbohydrate drink to patient 2-4 hours prior to surgery; Standing  -     Void on call to OR; Standing  -     Insert peripheral IV;  Standing  -     Place sequential compression device; Standing  - acetaminophen (TYLENOL) tablet 975 mg  -     gabapentin (NEURONTIN) capsule 600 mg  -     heparin (porcine) subcutaneous injection 5,000 Units  -     celecoxib (CeleBREX) capsule 200 mg  -     ceFAZolin (ANCEF) IVPB (premix in dextrose) 1,000 mg 50 mL             Thoracic History       Diagnosis: Right upper lobe non small cell lung carcinoma  Procedure: Flexible bronchoscopy, navigational bronchoscopy, and EBUS performed on 5/11/21   Pathology: right upper lobe biopsy and brushings revealed malignancy, consistent with non small cell carcinoma  10R revealed atypical cellular changes  Levels 4R and 7 were negative for carcinoma  Patient ID: Herson Balderas is a 44 y o  male  ECOG 0    HPI     Mr Nalini Hernández is a 43 yo gentleman who we evaluated for a right upper lobe pulmonary nodule that was originally found in March 2019  A PET scan at that time had no FDG activity  A few follow up CT scans demonstrated stability, but a recent CT scan from 2/11/21 revealed some growth of the nodule, measuring 1 7 x 1 2 cm  PFT's from 4/22/21 revealed a FEV1 of 91% and a DLCO of 101% predicted  Dr Joseph Art recommended a donna bronch, which was performed on 5/11/21  He returns today to discuss the results  On discussion, he started using nicotine patches this past Sunday and quit that day  He denies shortness of breath, fever, chills,  He has lost 6 lbs in one week from anxiety  He has some chest pain, 3/10, possible due to his anxiety  He denies hemoptysis, headaches, dizziness, nausea, or vomiting  He does have a sore throat and right shoulder pain for the past several months  He also has been having a dry cough at night  The following portions of the patient's history were reviewed and updated as appropriate: allergies, current medications, past family history, past medical history, past social history, past surgical history and problem list     Review of Systems      Objective:   Physical Exam  Vitals signs reviewed  Constitutional:       General: He is not in acute distress  Appearance: Normal appearance  He is normal weight  HENT:      Head: Normocephalic and atraumatic  Mouth/Throat:      Comments: Masked   Eyes:      General: No scleral icterus  Extraocular Movements: Extraocular movements intact  Neck:      Musculoskeletal: Normal range of motion and neck supple  Cardiovascular:      Rate and Rhythm: Normal rate and regular rhythm  Pulses: Normal pulses  Heart sounds: Normal heart sounds  No murmur  Pulmonary:      Effort: Pulmonary effort is normal  No respiratory distress  Breath sounds: Normal breath sounds  Abdominal:      General: Abdomen is flat  Bowel sounds are normal  There is no distension  Palpations: Abdomen is soft  Musculoskeletal: Normal range of motion  Right lower leg: No edema  Left lower leg: No edema  Lymphadenopathy:      Cervical: No cervical adenopathy  Skin:     General: Skin is warm and dry  Neurological:      Mental Status: He is alert and oriented to person, place, and time  Motor: No weakness  Psychiatric:         Mood and Affect: Mood normal          Behavior: Behavior normal      /84   Pulse 70   Temp 99 °F (37 2 °C) (Temporal)   Resp 16   Ht 6' 1" (1 854 m)   Wt 71 4 kg (157 lb 6 5 oz)   SpO2 98%   BMI 20 77 kg/m²        Ct Chest Wo Contrast    Result Date: 5/4/2021  Narrative CT CHEST WITHOUT IV CONTRAST INDICATION:   D38 1: Neoplasm of uncertain behavior of trachea, bronchus and lung  COMPARISON:  CT scan 2/11/2021 TECHNIQUE: CT examination of the chest was performed without intravenous contrast   Axial, sagittal, and coronal 2D reformatted images were created from the source data and submitted for interpretation  Radiation dose length product (DLP) for this visit:  358 16 mGy-cm     This examination, like all [facility] scans [facility] in [facility], was performed utilizing techniques to minimize radiation dose exposure, including the use of iterative  reconstruction and automated exposure control  FINDINGS: LUNGS:  Stable background emphysema with right upper lobe nodule measuring 1 7 x 1 3 cm image 4/20  Stable left upper lobe pleural-based nodule posteriorly measuring 7 mm on image 4/13  Stable mucoid impacted bronchus in the right middle lobe image 4/44 nothing new or suspicious  No endotracheal or endobronchial lesion  PLEURA:  Unremarkable  HEART/GREAT VESSELS:  Unremarkable for patient's age  MEDIASTINUM AND SOHA:  Unremarkable  CHEST WALL AND LOWER NECK:   Unremarkable  VISUALIZED STRUCTURES IN THE UPPER ABDOMEN:  Status post gastric bypass surgery  Status post cholecystectomy  Upper abdomen otherwise unremarkable  OSSEOUS STRUCTURES:  No acute fracture or destructive osseous lesion  Impression Stable background emphysema with right upper lobe ill-defined nodule measuring 1 7 x 1 3 cm image 4/20  Stable left upper lobe pleural-based nodule posteriorly measuring 7 mm on image 4/13  Continued follow-up recommended in one year   Workstation performed: [id number]

## 2021-05-19 NOTE — ASSESSMENT & PLAN NOTE
We had a discussion with Mr Froylan Barr regarding his pathology from his navigational bronchoscopy, in which the lung mass was consistent with non small cell lung cancer  The lymph node biopsies were negative for metastatic carcinoma  We are recommending a PET scan, to ensure there is no distant disease  His last PET was in 3/2019  If there is not, this would be a clinical stage I disease and therefore Dr Bev Catherine would recommend resection, which would consist of a flexible bronchoscopy and right robotic assisted upper lobectomy with MLND  The procedure, possible risks, and post operative course were explained and all questions were answered  We will find a date in the next several weeks, since he needs to be off cigarettes for at least 2-3 weeks prior to surgery  Consent form was signed today  We will call him if there are any abnormalities on the PET scan  He will have to undergo blood work prior to surgery

## 2021-05-20 ENCOUNTER — PATIENT OUTREACH (OUTPATIENT)
Dept: CASE MANAGEMENT | Facility: HOSPITAL | Age: 51
End: 2021-05-20

## 2021-05-20 NOTE — PROGRESS NOTES
MSW received a Distress Thermometer from Thoracic Surgery where the pt self scored a 7 to indicate his level of stress  The pt indicated depression, fears, nervousness, sadness and worry and his psychosocial stressors  He indicated 1 out of 21 physical stressors  MSW made outreach to pt this day and received his voicemail  Message was left, along with a contact number and the pt was encouraged to return the call

## 2021-05-27 ENCOUNTER — PATIENT OUTREACH (OUTPATIENT)
Dept: CASE MANAGEMENT | Facility: HOSPITAL | Age: 51
End: 2021-05-27

## 2021-05-27 NOTE — PROGRESS NOTES
Oncology LSW outreached PT after receiving his distress thermometer to provide support and assess for areas of need  PT reported that, while he was "shaken up" at this time, he did not have any questions or areas of concern at this time  LSW engaged in active listening at this time and provided PT with emotional support  PT reported that he was hopeful that his PET scan would show results that would allow him to be eligible for surgery  LSW explained that the  who would be working with PT was on vacation this week and her name was CARLITO Ga  LSW asked PT if he would be interested in Fredy Maravilla continuing to follow up with him throughout treatment for further emotional support, to which PT declined at this time  LSW reiterated that  services through [facility] would be available to him at any point during treatment  PT was receptive and understanding, thanking LSW for her outreach

## 2021-06-02 ENCOUNTER — ANESTHESIA EVENT (OUTPATIENT)
Dept: PERIOP | Facility: HOSPITAL | Age: 51
DRG: 165 | End: 2021-06-02
Payer: COMMERCIAL

## 2021-06-02 NOTE — PRE-PROCEDURE INSTRUCTIONS
Pre-Surgery Instructions:   Medication Instructions    amLODIPine (NORVASC) 5 mg tablet pt instructed to take on day of surgery with 1-2 sips of water    ibuprofen (MOTRIN) 800 mg tablet pt instructed to stop prior to surgery     Multiple Vitamins-Minerals (MULTI COMPLETE) CAPS pt instructed to stop prior to surgery     nicotine (NICODERM CQ) 21 mg/24 hr TD 24 hr patch pt can use day of surgery     omeprazole (PriLOSEC) 40 MG capsule pt instructed to take on day of surgery with 1-2 sips of water    QUEtiapine (SEROquel) 25 mg tablet pt takes at hs     topiramate (TOPAMAX) 50 MG tablet pt instructed to take on day of surgery with 1-2 sips of water    traZODone (DESYREL) 50 mg tablet pt takes at hs     Pt denies fever, sob, sore throat and cough  Pt verbalized understanding of shower and [place] visitor instructions  Pt instructed to stop nsaids and supplements prior to surgery

## 2021-06-03 ENCOUNTER — HOSPITAL ENCOUNTER (OUTPATIENT)
Dept: RADIOLOGY | Age: 51
Discharge: HOME | End: 2021-06-03
Payer: COMMERCIAL

## 2021-06-03 DIAGNOSIS — D38.1 NEOPLASM OF UNCERTAIN BEHAVIOR OF RIGHT UPPER LOBE OF LUNG: ICD-10-CM

## 2021-06-03 LAB — GLUCOSE SERPL-MCNC: 80 MG/DL (ref 65–140)

## 2021-06-03 PROCEDURE — 78815 PET IMAGE W/CT SKULL-THIGH: CPT

## 2021-06-03 PROCEDURE — 82948 REAGENT STRIP/BLOOD GLUCOSE: CPT

## 2021-06-03 PROCEDURE — G1004 CDSM NDSC: HCPCS

## 2021-06-03 PROCEDURE — A9552 F18 FDG: HCPCS

## 2021-06-04 ENCOUNTER — APPOINTMENT (OUTPATIENT)
Dept: LAB | Facility: MEDICAL CENTER | Age: 51
End: 2021-06-04
Payer: COMMERCIAL

## 2021-06-04 ENCOUNTER — LAB REQUISITION (OUTPATIENT)
Dept: LAB | Facility: HOSPITAL | Age: 51
DRG: 165 | End: 2021-06-04
Payer: COMMERCIAL

## 2021-06-04 DIAGNOSIS — D38.1 NEOPLASM OF UNCERTAIN BEHAVIOR OF TRACHEA, BRONCHUS AND LUNG: ICD-10-CM

## 2021-06-04 DIAGNOSIS — D38.1 NEOPLASM OF UNCERTAIN BEHAVIOR OF RIGHT UPPER LOBE OF LUNG: ICD-10-CM

## 2021-06-04 LAB
ABO GROUP BLD: NORMAL
ANION GAP SERPL CALCULATED.3IONS-SCNC: 5 MMOL/L (ref 4–13)
APTT PPP: 26 SECONDS (ref 23–37)
BLD GP AB SCN SERPL QL: NEGATIVE
BUN SERPL-MCNC: 12 MG/DL (ref 5–25)
CALCIUM SERPL-MCNC: 9.8 MG/DL (ref 8.3–10.1)
CHLORIDE SERPL-SCNC: 106 MMOL/L (ref 100–108)
CO2 SERPL-SCNC: 29 MMOL/L (ref 21–32)
CREAT SERPL-MCNC: 0.8 MG/DL (ref 0.6–1.3)
ERYTHROCYTE [DISTWIDTH] IN BLOOD BY AUTOMATED COUNT: 13.8 % (ref 11.6–15.1)
GFR SERPL CREATININE-BSD FRML MDRD: 103 ML/MIN/1.73SQ M
GLUCOSE SERPL-MCNC: 108 MG/DL (ref 65–140)
HCT VFR BLD AUTO: 49.6 % (ref 36.5–49.3)
HGB BLD-MCNC: 16.2 G/DL (ref 12–17)
INR PPP: 0.85 (ref 0.84–1.19)
MCH RBC QN AUTO: 32.2 PG (ref 26.8–34.3)
MCHC RBC AUTO-ENTMCNC: 32.7 G/DL (ref 31.4–37.4)
MCV RBC AUTO: 99 FL (ref 82–98)
PLATELET # BLD AUTO: 321 THOUSANDS/UL (ref 149–390)
PMV BLD AUTO: 9.1 FL (ref 8.9–12.7)
POTASSIUM SERPL-SCNC: 4.4 MMOL/L (ref 3.5–5.3)
PROTHROMBIN TIME: 11.7 SECONDS (ref 11.6–14.5)
RBC # BLD AUTO: 5.03 MILLION/UL (ref 3.88–5.62)
RH BLD: POSITIVE
SODIUM SERPL-SCNC: 140 MMOL/L (ref 136–145)
SPECIMEN EXPIRATION DATE: NORMAL
WBC # BLD AUTO: 8.54 THOUSAND/UL (ref 4.31–10.16)

## 2021-06-04 PROCEDURE — 85027 COMPLETE CBC AUTOMATED: CPT

## 2021-06-04 PROCEDURE — 36415 COLL VENOUS BLD VENIPUNCTURE: CPT

## 2021-06-04 PROCEDURE — 86850 RBC ANTIBODY SCREEN: CPT | Performed by: THORACIC SURGERY (CARDIOTHORACIC VASCULAR SURGERY)

## 2021-06-04 PROCEDURE — 86901 BLOOD TYPING SEROLOGIC RH(D): CPT | Performed by: THORACIC SURGERY (CARDIOTHORACIC VASCULAR SURGERY)

## 2021-06-04 PROCEDURE — 86900 BLOOD TYPING SEROLOGIC ABO: CPT | Performed by: THORACIC SURGERY (CARDIOTHORACIC VASCULAR SURGERY)

## 2021-06-04 PROCEDURE — 80048 BASIC METABOLIC PNL TOTAL CA: CPT

## 2021-06-04 PROCEDURE — 85610 PROTHROMBIN TIME: CPT

## 2021-06-04 PROCEDURE — 85730 THROMBOPLASTIN TIME PARTIAL: CPT

## 2021-06-07 ENCOUNTER — APPOINTMENT (INPATIENT)
Dept: RADIOLOGY | Facility: HOSPITAL | Age: 51
DRG: 165 | End: 2021-06-07
Payer: COMMERCIAL

## 2021-06-07 ENCOUNTER — ANESTHESIA (OUTPATIENT)
Dept: PERIOP | Facility: HOSPITAL | Age: 51
DRG: 165 | End: 2021-06-07
Payer: COMMERCIAL

## 2021-06-07 ENCOUNTER — HOSPITAL ENCOUNTER (INPATIENT)
Facility: HOSPITAL | Age: 51
LOS: 3 days | Discharge: HOME | DRG: 165 | End: 2021-06-10
Attending: THORACIC SURGERY (CARDIOTHORACIC VASCULAR SURGERY) | Admitting: THORACIC SURGERY (CARDIOTHORACIC VASCULAR SURGERY)
Payer: COMMERCIAL

## 2021-06-07 DIAGNOSIS — D38.1 NEOPLASM OF UNCERTAIN BEHAVIOR OF RIGHT UPPER LOBE OF LUNG: ICD-10-CM

## 2021-06-07 PROBLEM — M45.2 ANKYLOSING SPONDYLITIS OF CERVICAL REGION (HCC): Status: ACTIVE | Noted: 2021-06-07

## 2021-06-07 LAB
ABO GROUP BLD: NORMAL
ANION GAP SERPL CALCULATED.3IONS-SCNC: 4 MMOL/L (ref 4–13)
BUN SERPL-MCNC: 9 MG/DL (ref 5–25)
CALCIUM SERPL-MCNC: 8.2 MG/DL (ref 8.3–10.1)
CHLORIDE SERPL-SCNC: 112 MMOL/L (ref 100–108)
CO2 SERPL-SCNC: 24 MMOL/L (ref 21–32)
CREAT SERPL-MCNC: 0.66 MG/DL (ref 0.6–1.3)
ERYTHROCYTE [DISTWIDTH] IN BLOOD BY AUTOMATED COUNT: 13.7 % (ref 11.6–15.1)
GFR SERPL CREATININE-BSD FRML MDRD: 111 ML/MIN/1.73SQ M
GLUCOSE SERPL-MCNC: 161 MG/DL (ref 65–140)
HCT VFR BLD AUTO: 37.2 % (ref 36.5–49.3)
HGB BLD-MCNC: 12.4 G/DL (ref 12–17)
MAGNESIUM SERPL-MCNC: 2.1 MG/DL (ref 1.6–2.6)
MCH RBC QN AUTO: 32.8 PG (ref 26.8–34.3)
MCHC RBC AUTO-ENTMCNC: 33.3 G/DL (ref 31.4–37.4)
MCV RBC AUTO: 98 FL (ref 82–98)
PLATELET # BLD AUTO: 196 THOUSANDS/UL (ref 149–390)
PMV BLD AUTO: 8.3 FL (ref 8.9–12.7)
POTASSIUM SERPL-SCNC: 4.3 MMOL/L (ref 3.5–5.3)
RBC # BLD AUTO: 3.78 MILLION/UL (ref 3.88–5.62)
RH BLD: POSITIVE
SARS-COV-2 RNA RESP QL NAA+PROBE: NEGATIVE
SODIUM SERPL-SCNC: 140 MMOL/L (ref 136–145)
WBC # BLD AUTO: 10.81 THOUSAND/UL (ref 4.31–10.16)

## 2021-06-07 PROCEDURE — 71045 X-RAY EXAM CHEST 1 VIEW: CPT

## 2021-06-07 PROCEDURE — 88305 TISSUE EXAM BY PATHOLOGIST: CPT | Performed by: PATHOLOGY

## 2021-06-07 PROCEDURE — U0005 INFEC AGEN DETEC AMPLI PROBE: HCPCS | Performed by: THORACIC SURGERY (CARDIOTHORACIC VASCULAR SURGERY)

## 2021-06-07 PROCEDURE — U0003 INFECTIOUS AGENT DETECTION BY NUCLEIC ACID (DNA OR RNA); SEVERE ACUTE RESPIRATORY SYNDROME CORONAVIRUS 2 (SARS-COV-2) (CORONAVIRUS DISEASE [COVID-19]), AMPLIFIED PROBE TECHNIQUE, MAKING USE OF HIGH THROUGHPUT TECHNOLOGIES AS DESCRIBED BY CMS-2020-01-R: HCPCS | Performed by: THORACIC SURGERY (CARDIOTHORACIC VASCULAR SURGERY)

## 2021-06-07 PROCEDURE — 0BJ08ZZ INSPECTION OF TRACHEOBRONCHIAL TREE, VIA NATURAL OR ARTIFICIAL OPENING ENDOSCOPIC: ICD-10-PCS | Performed by: THORACIC SURGERY (CARDIOTHORACIC VASCULAR SURGERY)

## 2021-06-07 PROCEDURE — 07T74ZZ RESECTION OF THORAX LYMPHATIC, PERCUTANEOUS ENDOSCOPIC APPROACH: ICD-10-PCS | Performed by: THORACIC SURGERY (CARDIOTHORACIC VASCULAR SURGERY)

## 2021-06-07 PROCEDURE — 94668 MNPJ CHEST WALL SBSQ: CPT

## 2021-06-07 PROCEDURE — 88309 TISSUE EXAM BY PATHOLOGIST: CPT | Performed by: PATHOLOGY

## 2021-06-07 PROCEDURE — 32663 THORACOSCOPY W/LOBECTOMY: CPT | Performed by: THORACIC SURGERY (CARDIOTHORACIC VASCULAR SURGERY)

## 2021-06-07 PROCEDURE — C9290 INJ, BUPIVACAINE LIPOSOME: HCPCS | Performed by: THORACIC SURGERY (CARDIOTHORACIC VASCULAR SURGERY)

## 2021-06-07 PROCEDURE — 83735 ASSAY OF MAGNESIUM: CPT | Performed by: PHYSICIAN ASSISTANT

## 2021-06-07 PROCEDURE — 85027 COMPLETE CBC AUTOMATED: CPT | Performed by: PHYSICIAN ASSISTANT

## 2021-06-07 PROCEDURE — 0BTC4ZZ RESECTION OF RIGHT UPPER LUNG LOBE, PERCUTANEOUS ENDOSCOPIC APPROACH: ICD-10-PCS | Performed by: THORACIC SURGERY (CARDIOTHORACIC VASCULAR SURGERY)

## 2021-06-07 PROCEDURE — 32674 THORACOSCOPY LYMPH NODE EXC: CPT | Performed by: THORACIC SURGERY (CARDIOTHORACIC VASCULAR SURGERY)

## 2021-06-07 PROCEDURE — 80048 BASIC METABOLIC PNL TOTAL CA: CPT | Performed by: PHYSICIAN ASSISTANT

## 2021-06-07 PROCEDURE — 8E0W4CZ ROBOTIC ASSISTED PROCEDURE OF TRUNK REGION, PERCUTANEOUS ENDOSCOPIC APPROACH: ICD-10-PCS | Performed by: THORACIC SURGERY (CARDIOTHORACIC VASCULAR SURGERY)

## 2021-06-07 RX ORDER — LIDOCAINE HYDROCHLORIDE 10 MG/ML
INJECTION, SOLUTION EPIDURAL; INFILTRATION; INTRACAUDAL; PERINEURAL AS NEEDED
Status: DISCONTINUED | OUTPATIENT
Start: 2021-06-07 | End: 2021-06-07

## 2021-06-07 RX ORDER — OXYCODONE HYDROCHLORIDE 10 MG/1
10 TABLET ORAL EVERY 4 HOURS PRN
Status: DISCONTINUED | OUTPATIENT
Start: 2021-06-07 | End: 2021-06-10 | Stop reason: HOSPADM

## 2021-06-07 RX ORDER — SODIUM CHLORIDE, SODIUM LACTATE, POTASSIUM CHLORIDE, CALCIUM CHLORIDE 600; 310; 30; 20 MG/100ML; MG/100ML; MG/100ML; MG/100ML
125 INJECTION, SOLUTION INTRAVENOUS CONTINUOUS
Status: DISCONTINUED | OUTPATIENT
Start: 2021-06-07 | End: 2021-06-07

## 2021-06-07 RX ORDER — KETOROLAC TROMETHAMINE 30 MG/ML
15 INJECTION, SOLUTION INTRAMUSCULAR; INTRAVENOUS EVERY 6 HOURS SCHEDULED
Status: DISPENSED | OUTPATIENT
Start: 2021-06-07 | End: 2021-06-09

## 2021-06-07 RX ORDER — QUETIAPINE FUMARATE 25 MG/1
25 TABLET, FILM COATED ORAL
Status: DISCONTINUED | OUTPATIENT
Start: 2021-06-07 | End: 2021-06-10 | Stop reason: HOSPADM

## 2021-06-07 RX ORDER — ACETAMINOPHEN 325 MG/1
975 TABLET ORAL EVERY 6 HOURS
Status: DISCONTINUED | OUTPATIENT
Start: 2021-06-07 | End: 2021-06-10 | Stop reason: HOSPADM

## 2021-06-07 RX ORDER — HYDROMORPHONE HCL 110MG/55ML
PATIENT CONTROLLED ANALGESIA SYRINGE INTRAVENOUS AS NEEDED
Status: DISCONTINUED | OUTPATIENT
Start: 2021-06-07 | End: 2021-06-07

## 2021-06-07 RX ORDER — MAGNESIUM HYDROXIDE 1200 MG/15ML
LIQUID ORAL AS NEEDED
Status: DISCONTINUED | OUTPATIENT
Start: 2021-06-07 | End: 2021-06-07 | Stop reason: HOSPADM

## 2021-06-07 RX ORDER — PANTOPRAZOLE SODIUM 40 MG/1
40 TABLET, DELAYED RELEASE ORAL
Status: DISCONTINUED | OUTPATIENT
Start: 2021-06-08 | End: 2021-06-10 | Stop reason: HOSPADM

## 2021-06-07 RX ORDER — OXYCODONE HYDROCHLORIDE 5 MG/1
5 TABLET ORAL EVERY 4 HOURS PRN
Status: DISCONTINUED | OUTPATIENT
Start: 2021-06-07 | End: 2021-06-10 | Stop reason: HOSPADM

## 2021-06-07 RX ORDER — ONDANSETRON 2 MG/ML
INJECTION INTRAMUSCULAR; INTRAVENOUS AS NEEDED
Status: DISCONTINUED | OUTPATIENT
Start: 2021-06-07 | End: 2021-06-07

## 2021-06-07 RX ORDER — FENTANYL CITRATE 50 UG/ML
INJECTION, SOLUTION INTRAMUSCULAR; INTRAVENOUS AS NEEDED
Status: DISCONTINUED | OUTPATIENT
Start: 2021-06-07 | End: 2021-06-07

## 2021-06-07 RX ORDER — SODIUM CHLORIDE 9 MG/ML
INJECTION INTRAVENOUS AS NEEDED
Status: DISCONTINUED | OUTPATIENT
Start: 2021-06-07 | End: 2021-06-07 | Stop reason: HOSPADM

## 2021-06-07 RX ORDER — CELECOXIB 200 MG/1
200 CAPSULE ORAL ONCE
Status: COMPLETED | OUTPATIENT
Start: 2021-06-07 | End: 2021-06-07

## 2021-06-07 RX ORDER — HYDROMORPHONE HCL/PF 1 MG/ML
0.5 SYRINGE (ML) INJECTION
Status: DISCONTINUED | OUTPATIENT
Start: 2021-06-07 | End: 2021-06-07 | Stop reason: HOSPADM

## 2021-06-07 RX ORDER — AMLODIPINE BESYLATE 5 MG/1
5 TABLET ORAL DAILY
Status: DISCONTINUED | OUTPATIENT
Start: 2021-06-08 | End: 2021-06-10 | Stop reason: HOSPADM

## 2021-06-07 RX ORDER — EPHEDRINE SULFATE 50 MG/ML
INJECTION INTRAVENOUS AS NEEDED
Status: DISCONTINUED | OUTPATIENT
Start: 2021-06-07 | End: 2021-06-07

## 2021-06-07 RX ORDER — PROPOFOL 10 MG/ML
INJECTION, EMULSION INTRAVENOUS CONTINUOUS PRN
Status: DISCONTINUED | OUTPATIENT
Start: 2021-06-07 | End: 2021-06-07

## 2021-06-07 RX ORDER — PROPOFOL 10 MG/ML
INJECTION, EMULSION INTRAVENOUS AS NEEDED
Status: DISCONTINUED | OUTPATIENT
Start: 2021-06-07 | End: 2021-06-07

## 2021-06-07 RX ORDER — MEPERIDINE HYDROCHLORIDE 25 MG/ML
25 INJECTION INTRAMUSCULAR; INTRAVENOUS; SUBCUTANEOUS ONCE AS NEEDED
Status: DISCONTINUED | OUTPATIENT
Start: 2021-06-07 | End: 2021-06-07 | Stop reason: HOSPADM

## 2021-06-07 RX ORDER — SENNOSIDES 8.6 MG
1 TABLET ORAL DAILY
Status: DISCONTINUED | OUTPATIENT
Start: 2021-06-07 | End: 2021-06-10 | Stop reason: HOSPADM

## 2021-06-07 RX ORDER — TOPIRAMATE 25 MG/1
50 TABLET ORAL 2 TIMES DAILY
Status: DISCONTINUED | OUTPATIENT
Start: 2021-06-07 | End: 2021-06-10 | Stop reason: HOSPADM

## 2021-06-07 RX ORDER — DEXAMETHASONE SODIUM PHOSPHATE 10 MG/ML
INJECTION, SOLUTION INTRAMUSCULAR; INTRAVENOUS AS NEEDED
Status: DISCONTINUED | OUTPATIENT
Start: 2021-06-07 | End: 2021-06-07

## 2021-06-07 RX ORDER — TRAZODONE HYDROCHLORIDE 100 MG/1
100 TABLET ORAL
Status: DISCONTINUED | OUTPATIENT
Start: 2021-06-07 | End: 2021-06-10 | Stop reason: HOSPADM

## 2021-06-07 RX ORDER — CEFAZOLIN SODIUM 1 G/3ML
INJECTION, POWDER, FOR SOLUTION INTRAMUSCULAR; INTRAVENOUS AS NEEDED
Status: DISCONTINUED | OUTPATIENT
Start: 2021-06-07 | End: 2021-06-07

## 2021-06-07 RX ORDER — DOCUSATE SODIUM 100 MG/1
100 CAPSULE, LIQUID FILLED ORAL 2 TIMES DAILY
Status: DISCONTINUED | OUTPATIENT
Start: 2021-06-07 | End: 2021-06-10 | Stop reason: HOSPADM

## 2021-06-07 RX ORDER — ONDANSETRON 2 MG/ML
4 INJECTION INTRAMUSCULAR; INTRAVENOUS EVERY 6 HOURS PRN
Status: DISCONTINUED | OUTPATIENT
Start: 2021-06-07 | End: 2021-06-10 | Stop reason: HOSPADM

## 2021-06-07 RX ORDER — NICOTINE 21 MG/24HR
1 PATCH, TRANSDERMAL 24 HOURS TRANSDERMAL EVERY 24 HOURS
Status: DISCONTINUED | OUTPATIENT
Start: 2021-06-08 | End: 2021-06-10 | Stop reason: HOSPADM

## 2021-06-07 RX ORDER — ACETAMINOPHEN 325 MG/1
975 TABLET ORAL ONCE
Status: COMPLETED | OUTPATIENT
Start: 2021-06-07 | End: 2021-06-07

## 2021-06-07 RX ORDER — ALBUMIN, HUMAN INJ 5% 5 %
SOLUTION INTRAVENOUS CONTINUOUS PRN
Status: DISCONTINUED | OUTPATIENT
Start: 2021-06-07 | End: 2021-06-07

## 2021-06-07 RX ORDER — LIDOCAINE HYDROCHLORIDE 10 MG/ML
0.5 INJECTION, SOLUTION EPIDURAL; INFILTRATION; INTRACAUDAL; PERINEURAL ONCE AS NEEDED
Status: DISCONTINUED | OUTPATIENT
Start: 2021-06-07 | End: 2021-06-07 | Stop reason: HOSPADM

## 2021-06-07 RX ORDER — POLYETHYLENE GLYCOL 3350 17 G/17G
17 POWDER, FOR SOLUTION ORAL DAILY PRN
Status: DISCONTINUED | OUTPATIENT
Start: 2021-06-07 | End: 2021-06-10 | Stop reason: HOSPADM

## 2021-06-07 RX ORDER — KETAMINE HCL IN NACL, ISO-OSM 100MG/10ML
SYRINGE (ML) INJECTION AS NEEDED
Status: DISCONTINUED | OUTPATIENT
Start: 2021-06-07 | End: 2021-06-07

## 2021-06-07 RX ORDER — HEPARIN SODIUM 5000 [USP'U]/ML
5000 INJECTION, SOLUTION INTRAVENOUS; SUBCUTANEOUS
Status: COMPLETED | OUTPATIENT
Start: 2021-06-07 | End: 2021-06-07

## 2021-06-07 RX ORDER — KETOROLAC TROMETHAMINE 30 MG/ML
INJECTION, SOLUTION INTRAMUSCULAR; INTRAVENOUS AS NEEDED
Status: DISCONTINUED | OUTPATIENT
Start: 2021-06-07 | End: 2021-06-07

## 2021-06-07 RX ORDER — ROCURONIUM BROMIDE 10 MG/ML
INJECTION, SOLUTION INTRAVENOUS AS NEEDED
Status: DISCONTINUED | OUTPATIENT
Start: 2021-06-07 | End: 2021-06-07

## 2021-06-07 RX ORDER — GABAPENTIN 300 MG/1
300 CAPSULE ORAL 3 TIMES DAILY
Status: DISCONTINUED | OUTPATIENT
Start: 2021-06-07 | End: 2021-06-10 | Stop reason: HOSPADM

## 2021-06-07 RX ORDER — CEFAZOLIN SODIUM 1 G/50ML
1000 SOLUTION INTRAVENOUS ONCE
Status: COMPLETED | OUTPATIENT
Start: 2021-06-07 | End: 2021-06-07

## 2021-06-07 RX ORDER — GLYCOPYRROLATE 0.2 MG/ML
INJECTION INTRAMUSCULAR; INTRAVENOUS AS NEEDED
Status: DISCONTINUED | OUTPATIENT
Start: 2021-06-07 | End: 2021-06-07

## 2021-06-07 RX ORDER — SODIUM CHLORIDE 9 MG/ML
INJECTION, SOLUTION INTRAVENOUS CONTINUOUS PRN
Status: DISCONTINUED | OUTPATIENT
Start: 2021-06-07 | End: 2021-06-07

## 2021-06-07 RX ORDER — BUPIVACAINE HYDROCHLORIDE 2.5 MG/ML
INJECTION, SOLUTION EPIDURAL; INFILTRATION; INTRACAUDAL AS NEEDED
Status: DISCONTINUED | OUTPATIENT
Start: 2021-06-07 | End: 2021-06-07 | Stop reason: HOSPADM

## 2021-06-07 RX ORDER — ONDANSETRON 2 MG/ML
4 INJECTION INTRAMUSCULAR; INTRAVENOUS ONCE AS NEEDED
Status: COMPLETED | OUTPATIENT
Start: 2021-06-07 | End: 2021-06-07

## 2021-06-07 RX ORDER — MIDAZOLAM HYDROCHLORIDE 2 MG/2ML
INJECTION, SOLUTION INTRAMUSCULAR; INTRAVENOUS AS NEEDED
Status: DISCONTINUED | OUTPATIENT
Start: 2021-06-07 | End: 2021-06-07

## 2021-06-07 RX ORDER — GABAPENTIN 300 MG/1
600 CAPSULE ORAL ONCE
Status: COMPLETED | OUTPATIENT
Start: 2021-06-07 | End: 2021-06-07

## 2021-06-07 RX ORDER — SODIUM CHLORIDE, SODIUM LACTATE, POTASSIUM CHLORIDE, CALCIUM CHLORIDE 600; 310; 30; 20 MG/100ML; MG/100ML; MG/100ML; MG/100ML
60 INJECTION, SOLUTION INTRAVENOUS CONTINUOUS
Status: DISCONTINUED | OUTPATIENT
Start: 2021-06-07 | End: 2021-06-08

## 2021-06-07 RX ORDER — HYDROMORPHONE HCL/PF 1 MG/ML
0.5 SYRINGE (ML) INJECTION EVERY 4 HOURS PRN
Status: DISCONTINUED | OUTPATIENT
Start: 2021-06-07 | End: 2021-06-10 | Stop reason: HOSPADM

## 2021-06-07 RX ADMIN — VASOPRESSIN 3 UNITS: 20 INJECTION INTRAVENOUS at 12:40

## 2021-06-07 RX ADMIN — KETOROLAC TROMETHAMINE 15 MG: 30 INJECTION, SOLUTION INTRAMUSCULAR at 17:24

## 2021-06-07 RX ADMIN — QUETIAPINE FUMARATE 25 MG: 25 TABLET ORAL at 21:48

## 2021-06-07 RX ADMIN — GLYCOPYRROLATE 0.2 MG: 0.2 INJECTION, SOLUTION INTRAMUSCULAR; INTRAVENOUS at 11:47

## 2021-06-07 RX ADMIN — EPHEDRINE SULFATE 10 MG: 50 INJECTION, SOLUTION INTRAVENOUS at 11:34

## 2021-06-07 RX ADMIN — GABAPENTIN 300 MG: 300 CAPSULE ORAL at 21:47

## 2021-06-07 RX ADMIN — Medication 10 MG: at 11:58

## 2021-06-07 RX ADMIN — CELECOXIB 200 MG: 200 CAPSULE ORAL at 10:19

## 2021-06-07 RX ADMIN — Medication 10 MG: at 13:40

## 2021-06-07 RX ADMIN — ROCURONIUM BROMIDE 20 MG: 50 INJECTION, SOLUTION INTRAVENOUS at 12:19

## 2021-06-07 RX ADMIN — PHENYLEPHRINE HYDROCHLORIDE 100 MCG: 10 INJECTION INTRAVENOUS at 12:22

## 2021-06-07 RX ADMIN — PROPOFOL 50 MCG/KG/MIN: 10 INJECTION, EMULSION INTRAVENOUS at 11:27

## 2021-06-07 RX ADMIN — ROCURONIUM BROMIDE 20 MG: 50 INJECTION, SOLUTION INTRAVENOUS at 11:59

## 2021-06-07 RX ADMIN — Medication 10 MG: at 12:45

## 2021-06-07 RX ADMIN — SUGAMMADEX 200 MG: 100 INJECTION, SOLUTION INTRAVENOUS at 15:16

## 2021-06-07 RX ADMIN — ALBUMIN (HUMAN): 12.5 INJECTION, SOLUTION INTRAVENOUS at 12:05

## 2021-06-07 RX ADMIN — GLYCOPYRROLATE 0.2 MG: 0.2 INJECTION, SOLUTION INTRAMUSCULAR; INTRAVENOUS at 12:35

## 2021-06-07 RX ADMIN — HYDROMORPHONE HYDROCHLORIDE 0.5 MG: 2 INJECTION, SOLUTION INTRAMUSCULAR; INTRAVENOUS; SUBCUTANEOUS at 11:58

## 2021-06-07 RX ADMIN — PHENYLEPHRINE HYDROCHLORIDE 100 MCG: 10 INJECTION INTRAVENOUS at 12:14

## 2021-06-07 RX ADMIN — PROPOFOL 180 MG: 10 INJECTION, EMULSION INTRAVENOUS at 11:25

## 2021-06-07 RX ADMIN — ACETAMINOPHEN 975 MG: 325 TABLET, FILM COATED ORAL at 17:23

## 2021-06-07 RX ADMIN — Medication 10 MG: at 14:34

## 2021-06-07 RX ADMIN — VASOPRESSIN 3 UNITS: 20 INJECTION INTRAVENOUS at 13:09

## 2021-06-07 RX ADMIN — ONDANSETRON 4 MG: 2 INJECTION INTRAMUSCULAR; INTRAVENOUS at 15:33

## 2021-06-07 RX ADMIN — MIDAZOLAM 2 MG: 1 INJECTION INTRAMUSCULAR; INTRAVENOUS at 11:21

## 2021-06-07 RX ADMIN — GABAPENTIN 300 MG: 300 CAPSULE ORAL at 17:23

## 2021-06-07 RX ADMIN — OXYCODONE HYDROCHLORIDE 10 MG: 10 TABLET ORAL at 19:30

## 2021-06-07 RX ADMIN — SODIUM CHLORIDE, SODIUM LACTATE, POTASSIUM CHLORIDE, AND CALCIUM CHLORIDE 60 ML/HR: .6; .31; .03; .02 INJECTION, SOLUTION INTRAVENOUS at 16:14

## 2021-06-07 RX ADMIN — SODIUM CHLORIDE, SODIUM LACTATE, POTASSIUM CHLORIDE, AND CALCIUM CHLORIDE 125 ML/HR: .6; .31; .03; .02 INJECTION, SOLUTION INTRAVENOUS at 11:06

## 2021-06-07 RX ADMIN — ALBUMIN (HUMAN): 12.5 INJECTION, SOLUTION INTRAVENOUS at 11:40

## 2021-06-07 RX ADMIN — ROCURONIUM BROMIDE 10 MG: 50 INJECTION, SOLUTION INTRAVENOUS at 14:00

## 2021-06-07 RX ADMIN — ACETAMINOPHEN 975 MG: 325 TABLET, FILM COATED ORAL at 21:47

## 2021-06-07 RX ADMIN — EPHEDRINE SULFATE 5 MG: 50 INJECTION, SOLUTION INTRAVENOUS at 11:47

## 2021-06-07 RX ADMIN — FENTANYL CITRATE 50 MCG: 50 INJECTION INTRAMUSCULAR; INTRAVENOUS at 11:58

## 2021-06-07 RX ADMIN — ROCURONIUM BROMIDE 60 MG: 50 INJECTION, SOLUTION INTRAVENOUS at 11:25

## 2021-06-07 RX ADMIN — TOPIRAMATE 50 MG: 25 TABLET, FILM COATED ORAL at 17:27

## 2021-06-07 RX ADMIN — ENOXAPARIN SODIUM 40 MG: 40 INJECTION SUBCUTANEOUS at 17:34

## 2021-06-07 RX ADMIN — ROCURONIUM BROMIDE 20 MG: 50 INJECTION, SOLUTION INTRAVENOUS at 13:15

## 2021-06-07 RX ADMIN — EPHEDRINE SULFATE 10 MG: 50 INJECTION, SOLUTION INTRAVENOUS at 11:36

## 2021-06-07 RX ADMIN — HEPARIN SODIUM 5000 UNITS: 5000 INJECTION INTRAVENOUS; SUBCUTANEOUS at 10:20

## 2021-06-07 RX ADMIN — SODIUM CHLORIDE: 0.9 INJECTION, SOLUTION INTRAVENOUS at 11:27

## 2021-06-07 RX ADMIN — SODIUM CHLORIDE, SODIUM LACTATE, POTASSIUM CHLORIDE, AND CALCIUM CHLORIDE: .6; .31; .03; .02 INJECTION, SOLUTION INTRAVENOUS at 15:18

## 2021-06-07 RX ADMIN — ONDANSETRON 4 MG: 2 INJECTION INTRAMUSCULAR; INTRAVENOUS at 14:59

## 2021-06-07 RX ADMIN — HYDROMORPHONE HYDROCHLORIDE 0.5 MG: 1 INJECTION, SOLUTION INTRAMUSCULAR; INTRAVENOUS; SUBCUTANEOUS at 15:46

## 2021-06-07 RX ADMIN — CEFAZOLIN SODIUM 1000 MG: 1 SOLUTION INTRAVENOUS at 11:15

## 2021-06-07 RX ADMIN — SODIUM CHLORIDE 0.1 MCG/KG/HR: 9 INJECTION, SOLUTION INTRAVENOUS at 11:27

## 2021-06-07 RX ADMIN — EPHEDRINE SULFATE 5 MG: 50 INJECTION, SOLUTION INTRAVENOUS at 14:34

## 2021-06-07 RX ADMIN — DEXAMETHASONE SODIUM PHOSPHATE 10 MG: 10 INJECTION, SOLUTION INTRAMUSCULAR; INTRAVENOUS at 11:30

## 2021-06-07 RX ADMIN — GABAPENTIN 600 MG: 300 CAPSULE ORAL at 10:19

## 2021-06-07 RX ADMIN — LIDOCAINE HYDROCHLORIDE 50 MG: 10 INJECTION, SOLUTION EPIDURAL; INFILTRATION; INTRACAUDAL; PERINEURAL at 11:25

## 2021-06-07 RX ADMIN — KETOROLAC TROMETHAMINE 30 MG: 30 INJECTION, SOLUTION INTRAMUSCULAR at 14:59

## 2021-06-07 RX ADMIN — Medication 10 MG: at 11:25

## 2021-06-07 RX ADMIN — EPHEDRINE SULFATE 10 MG: 50 INJECTION, SOLUTION INTRAVENOUS at 14:06

## 2021-06-07 RX ADMIN — PHENYLEPHRINE HYDROCHLORIDE 100 MCG: 10 INJECTION INTRAVENOUS at 12:39

## 2021-06-07 RX ADMIN — ACETAMINOPHEN 975 MG: 325 TABLET, FILM COATED ORAL at 10:18

## 2021-06-07 RX ADMIN — HYDROMORPHONE HYDROCHLORIDE 0.5 MG: 1 INJECTION, SOLUTION INTRAMUSCULAR; INTRAVENOUS; SUBCUTANEOUS at 20:48

## 2021-06-07 RX ADMIN — FENTANYL CITRATE 50 MCG: 50 INJECTION INTRAMUSCULAR; INTRAVENOUS at 11:25

## 2021-06-07 RX ADMIN — ROCURONIUM BROMIDE 20 MG: 50 INJECTION, SOLUTION INTRAVENOUS at 13:45

## 2021-06-07 RX ADMIN — PHENYLEPHRINE HYDROCHLORIDE 100 MCG: 10 INJECTION INTRAVENOUS at 12:01

## 2021-06-07 RX ADMIN — HYDROMORPHONE HYDROCHLORIDE 0.5 MG: 2 INJECTION, SOLUTION INTRAMUSCULAR; INTRAVENOUS; SUBCUTANEOUS at 15:16

## 2021-06-07 RX ADMIN — CEFAZOLIN 1000 MG: 1 INJECTION, POWDER, FOR SOLUTION INTRAMUSCULAR; INTRAVENOUS at 15:03

## 2021-06-07 RX ADMIN — ROCURONIUM BROMIDE 10 MG: 50 INJECTION, SOLUTION INTRAVENOUS at 14:45

## 2021-06-07 RX ADMIN — ROCURONIUM BROMIDE 20 MG: 50 INJECTION, SOLUTION INTRAVENOUS at 12:45

## 2021-06-07 RX ADMIN — PHENYLEPHRINE HYDROCHLORIDE 100 MCG: 10 INJECTION INTRAVENOUS at 11:47

## 2021-06-07 RX ADMIN — PHENYLEPHRINE HYDROCHLORIDE 50 MCG/MIN: 10 INJECTION INTRAVENOUS at 12:28

## 2021-06-07 RX ADMIN — TRAZODONE HYDROCHLORIDE 100 MG: 100 TABLET ORAL at 21:48

## 2021-06-07 NOTE — ANESTHESIA PREPROCEDURE EVALUATION
Procedure:  LOBECTOMY LUNG THORACOSCOPIC W/ ROBOTICS (Right Chest)  BRONCHOSCOPY FLEXIBLE (N/A Bronchus)    Relevant Problems   ANESTHESIA (within normal limits)      CARDIO   (+) Hyperlipidemia   (+) Hypertension   (+) Mild pulmonary hypertension (HCC)      GI/HEPATIC   (+) Gastroesophageal reflux disease      MUSCULOSKELETAL   (+) Ankylosing spondylitis of cervical region (HCC)   (+) Thoracic back pain      NEURO/PSYCH   (+) Anxiety   (+) History of colon polyps   (+) Recurrent major depressive disorder, in partial remission (HCC)      PULMONARY   (+) Centrilobular emphysema (HCC)      Other   (+) Multiple lung nodules   (+) 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        Physical Exam    Airway    Mallampati score: I  TM Distance: >3 FB  Neck ROM: full     Dental       Cardiovascular      Pulmonary      Other Findings  Upper partial denture  Very little limitation of neck ROM  Previous intubation was easy, with grade 1 view      Anesthesia Plan  ASA Score- 2     Anesthesia Type- general with ASA Monitors  Additional Monitors: arterial line  Airway Plan: ETT  Plan Factors-Exercise tolerance (METS): >4 METS  Chart reviewed  EKG reviewed  Imaging results reviewed  Existing labs reviewed  Patient summary reviewed  Patient is not a current smoker (Quit last month)  Patient instructed to abstain from smoking on day of procedure  Patient did not smoke on day of surgery  Induction- intravenous  Postoperative Plan- Plan for postoperative opioid use  Informed Consent- Anesthetic plan and risks discussed with patient  I personally reviewed this patient with the CRNA  Discussed and agreed on the Anesthesia Plan with the CRNA  Janet Hunter

## 2021-06-07 NOTE — ANESTHESIA POSTPROCEDURE EVALUATION
Post-Op Assessment Note    CV Status:  Stable  Pain Score: 0    Pain management: adequate     Mental Status:  Alert and awake   Hydration Status:  Euvolemic   PONV Controlled:  Controlled   Airway Patency:  Patent and adequate      Post Op Vitals Reviewed: Yes      Staff: CRNA         No complications documented      /68 (06/07/21 1525)    Temp 98 1 °F (36 7 °C) (06/07/21 1525)    Pulse 81 (06/07/21 1525)   Resp 16 (06/07/21 1525)    SpO2 99 % (06/07/21 1525)

## 2021-06-07 NOTE — OP NOTE
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]    Procedure(s) (LRB):  RIGHT UPPER LOBECTOMY LUNG THORACOSCOPIC W/ ROBOTICS (Right)  BRONCHOSCOPY FLEXIBLE (N/A)   1  Robotic right upper lobectomy  2  Mediastinal lymph node dissection  3  Bronchoscopy  4    Right T3 through T10 intercostal nerve block Exparel    Specimen(s):  ID Type Source Tests Collected by Time Destination   1 : Level 9 Tissue Lymph Node TISSUE EXAM Teodora Morse MD 2021 1224    2 : Level 8 #1 Tissue Lymph Node TISSUE EXAM Teodora Morse MD 2021 1226    3 : Level 8 #2 Tissue Lymph Node TISSUE EXAM Teodora Mrose MD 2021 1226    4 : Level 7 Tissue Lymph Node TISSUE EXAM Teodora Morse MD 2021 1229    5 : posterior level 11 Tissue Lymph Node TISSUE EXAM Teodora Morse MD 2021 1243    6 :  level 4r Tissue Lymph Node TISSUE EXAM Teodora Morse MD 2021 1247    7 :  level 2r Tissue Lymph Node TISSUE EXAM Teodora Morse MD 2021 1250    8 : posterior level 10 Tissue Lymph Node TISSUE Marley Cardona MD 2021 1313    9 : posterior level 10 #2 Tissue Lymph Node TISSUE Marley Cardona MD 2021 1331    10 : anterior level 10  Tissue Lymph Node TISSUE EXAM Teodora Morse MD 2021 1340    11 : LEVEL 11 ON BRONCHUS Tissue Lymph Node TISSUE Marley Cardona MD 2021 1416    12 : LEVEL 11 ON BRONCHUS #2 Tissue Lymph Node TISSUE Marley Cardona MD 2021 1425    13 :  Tissue Lung, Right Upper Lobe TISSUE EXAM Teodora Morse MD 2021 1444 Estimated Blood Loss:   50 mL    Drains:  Chest Tube 1 Right Pleural 28 Fr  (Active)   Function To water seal 06/07/21 1525   Chest Tube Air Leak No 06/07/21 1525   Patency Intervention Tip/tilt 06/07/21 1525   Drainage Description Other (Comment) 06/07/21 1525   Dressing Status Clean;Dry; Intact 06/07/21 1525   Site Assessment WDL 06/07/21 1525   Surrounding Skin Dry; Intact 06/07/21 1525   Number of days: 0       Urethral Catheter Double-lumen 16 Fr  (Active)   Number of days: 0       Anesthesia Type:   General    Operative Indications:  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 [D451]  47-year-old male with a 1 7 cm right upper lobe non-small cell lung cancer consistent with a stage I cancer at this time    Operative Findings:   right upper lobe mass  No definite evidence of  Lymph node invasion    Complications:   None    Procedure and Technique:  After obtaining informed consent from the patient, they were transferred to the operating room and placed supine on the operating table  Bilateral SCDs were placed and turned on prior to induction of general anesthesia  General anesthesia was then induced and a double-lumen endotracheal tube was placed without incident  Positioning of the double-lumen tube was verified with a pediatric bronchoscope  The patient was then positioned in a left lateral decubitus position with the table fully flexed  The left arm was secured to an armboard and the right arm was positioned safely in a arm duffy  Rolled blankets were used for support anterior and posterior and pillows were placed between her legs  All bony prominences were padded and checked  Positioning of double-lumen endotracheal tube was verified at this time  Next a formal time-out was called verifying patient , date of birth, procedure, consent, antibiotics, beta-blocker as indicated, anticipated specimens with handling, SCD use and other special considerations        A bronchoscopy was performed through the double-lumen endotracheal tube  There were no endobronchial masses or obstructions identified  All secretions were suctioned out and positioning of the double lumen tube was again verified  There was no suspicion or identified risk for TB or other air board infectious disease  This bronchoscopy was being performed for diagnostic purposes only  The right chest was then prepped and draped in the standard sterile fashion  Anesthesia clamped and placed suction to the right lung at this time to help desufflate  Bony landmarks were identified and planned incisions were mapped out  An 8 mm incision was made in the posterior axillary line 7th interspace for the camera port  The camera was inserted verifying that we are in the thoracic cavity and insufflation was initiated to 8 mm of mercury  A thorough thoracoscopy was carried out at this time  There were no significant adhesions  Next an intercostal nerve block was performed using 60 mL of a mixture 1/2 percent Marcaine, normal saline and liposomal bupivacaine (Exparel)  Rib spaces 3 through 10 were identified and using a percutaneous approach a block was placed into each of these ribs spaces with approximately 5mL of this mixture  The remainder of the robotic ports were placed at this time  An 8 mm incision was made as far posterior as possible in the 8th interspace in the posterior port was placed under direct visualization  An 12 mm incision was made long term in between the camera and arm 1 port and this port was placed under direct visualization  Anteriorly a 12 mm incision was made as anterior and inferior as possible along the costal margin with the insertion point just above the diaphragm  The robotic stapling port was placed through this under direct visualization  Finally a 15 mm incision was made long term between the camera and arm 4, as inferior as possible staying above the diaphragm    A 15 mm assistant port was placed through here    The robot was docked at this time  A caudier forceps was inserted into arm 2, [place] bipolar grasper into arm 4 and tip up fenestrated grasper into arm 1  I un-scrubbed at this time and went to the console  We began by taking down the inferior pulmonary ligament with bipolar cautery  This dissection was continued towards the hilum to the level of the inferior pulmonary vein  We looked for any possible level 8 or level 9 lymph nodes  These nodes were removed and sent for permanent pathology  This dissection was continued up posteriorly towards the azygous vein  We dissected the right upper and lower lobes off of the right mainstem bronchus, bronchus intermedius and right upper lobe bronchus reflecting the lung anteriorly  Next we dissected out the subcarinal level 7 lymph node packet  This was removed completely and sent for permanent pathology  We continued to mobilize as much of the upper and lower lobe is possible from the posterior aspect  We cleared off the apical hilar attachments  The lung was then reflected downward exposing the level 4R and 2R jerome space  We entered into this space with bipolar cautery  Two large jerome packets were identified, removed and sent for permanent pathology  We then went to the fissure  The fissure was incomplete and needed to be carefully and meticulously  with bipolar cautery  Anteriorly I dissected down onto the main pulmonary artery  We followed this dissection posterior staying on the PA  We sequentially stapled the fissure with robotic 45 mm blue load staple firings  We were eventually able to dissect out back to the posterior aspect of the fissure  We completed our fissure with staples  This fully  the right upper and right lower lobes  We then dissected out the posterior ascending artery branches  There were 2 separate branches that were taken individually    These both went to the right upper lobe  Multiple lymph nodes in this location were removed and sent for permanent pathology  We stapled the PA branches with robotic 30 mm white load staplers  The superior pulmonary vein was identified and dissected free  I dissected out individual branches and identified the right middle lobe and right upper lobe branches of the superior pulmonary vein  Patient had a  hi apical pulmonary vein branch that was taken separately  Otherwise we stapled the remainder of the right upper lobe pulmonary vein with a 45 mm white load stapler  The truncus arteriosus was immediately behind this  This was dissected free and stapled with a robotic 45 mm white load stapler  We then went back to the posterior and dissected out the right upper lobe bronchus  Multiple lymph nodes from here were removed and sent for permanent pathology  The right upper lobe bronchus was clamped with a 45 mm green load stapler and the  Remaining lung was able to be inflated without issue  We then divided the right upper lobe bronchus  One of the posterior   Ascending pulmonary artery branches had some mild bleeding from the staple line  This was clipped with a 10 mm bedside clip applier  we then stapled the minor fissure  We made sure to preserve the right middle lobe vein and artery branches  This was stapled with multiple robotic 45 mm blue and green load staple firings  This fully  our specimen  The right upper lobe specimen was placed in a 15 mm thoracoscopic anchor bag and removed through the assistant port  We examined the specimen and found   Are mass within    This was sent for permanent pathology  The right chest was thoroughly explored and aspirated dry  There was no appreciable bleeding seen  All robotic ports were removed under direct visualization and inspected for bleeding   Once satisfied with hemostasis, a 28 [place] straight chest tube was placed through the camera port and the left upper lobe was then insufflated by anesthesia  This came up without incident and the camera was removed at this time  The 28 Upper sorbian chest tube was secured to the chest wall with an 0 Prolene  An 0 Prolene U-stitch was also placed for help closing this incision following chest tube removal  The assistant port and specimen removal site was closed with running 0 Vicryl in the deep layers, 3-0 Vicryl in the soft tissue and 4 0 Monocryl for skin  The remainder of the ports were closed with interrupted 3-0 Vicryl and 4 Monocryl  Surgical glue was applied to all incisions  The chest tube was placed to a [place] Pleural drainage device to water seal        The patient extubated in the operating room and was transferred safely to the stretcher  There were transferred to the PACU in stable condition        I was present for the entire procedure    Patient Disposition:  PACU     SIGNATURE: Cheikh Wakefield MD  DATE: June 7, 2021  TIME: 3:32 PM

## 2021-06-08 LAB
ANION GAP SERPL CALCULATED.3IONS-SCNC: 4 MMOL/L (ref 4–13)
BUN SERPL-MCNC: 8 MG/DL (ref 5–25)
CALCIUM SERPL-MCNC: 8.5 MG/DL (ref 8.3–10.1)
CHLORIDE SERPL-SCNC: 112 MMOL/L (ref 100–108)
CO2 SERPL-SCNC: 26 MMOL/L (ref 21–32)
CREAT SERPL-MCNC: 0.44 MG/DL (ref 0.6–1.3)
ERYTHROCYTE [DISTWIDTH] IN BLOOD BY AUTOMATED COUNT: 13.6 % (ref 11.6–15.1)
GFR SERPL CREATININE-BSD FRML MDRD: 131 ML/MIN/1.73SQ M
GLUCOSE SERPL-MCNC: 85 MG/DL (ref 65–140)
HCT VFR BLD AUTO: 36.2 % (ref 36.5–49.3)
HGB BLD-MCNC: 12.1 G/DL (ref 12–17)
MAGNESIUM SERPL-MCNC: 2.3 MG/DL (ref 1.6–2.6)
MCH RBC QN AUTO: 32.8 PG (ref 26.8–34.3)
MCHC RBC AUTO-ENTMCNC: 33.4 G/DL (ref 31.4–37.4)
MCV RBC AUTO: 98 FL (ref 82–98)
PLATELET # BLD AUTO: 191 THOUSANDS/UL (ref 149–390)
PMV BLD AUTO: 8.5 FL (ref 8.9–12.7)
POTASSIUM SERPL-SCNC: 3.8 MMOL/L (ref 3.5–5.3)
RBC # BLD AUTO: 3.69 MILLION/UL (ref 3.88–5.62)
SODIUM SERPL-SCNC: 142 MMOL/L (ref 136–145)
WBC # BLD AUTO: 11.79 THOUSAND/UL (ref 4.31–10.16)

## 2021-06-08 PROCEDURE — 85027 COMPLETE CBC AUTOMATED: CPT | Performed by: PHYSICIAN ASSISTANT

## 2021-06-08 PROCEDURE — 94668 MNPJ CHEST WALL SBSQ: CPT

## 2021-06-08 PROCEDURE — 99024 POSTOP FOLLOW-UP VISIT: CPT | Performed by: THORACIC SURGERY (CARDIOTHORACIC VASCULAR SURGERY)

## 2021-06-08 PROCEDURE — 94760 N-INVAS EAR/PLS OXIMETRY 1: CPT

## 2021-06-08 PROCEDURE — 80048 BASIC METABOLIC PNL TOTAL CA: CPT | Performed by: THORACIC SURGERY (CARDIOTHORACIC VASCULAR SURGERY)

## 2021-06-08 PROCEDURE — 83735 ASSAY OF MAGNESIUM: CPT | Performed by: THORACIC SURGERY (CARDIOTHORACIC VASCULAR SURGERY)

## 2021-06-08 RX ORDER — POTASSIUM CHLORIDE 20 MEQ/1
40 TABLET, EXTENDED RELEASE ORAL ONCE
Status: COMPLETED | OUTPATIENT
Start: 2021-06-08 | End: 2021-06-08

## 2021-06-08 RX ADMIN — KETOROLAC TROMETHAMINE 15 MG: 30 INJECTION, SOLUTION INTRAMUSCULAR at 06:15

## 2021-06-08 RX ADMIN — ACETAMINOPHEN 975 MG: 325 TABLET, FILM COATED ORAL at 04:12

## 2021-06-08 RX ADMIN — TOPIRAMATE 50 MG: 25 TABLET, FILM COATED ORAL at 08:11

## 2021-06-08 RX ADMIN — DOCUSATE SODIUM 100 MG: 100 CAPSULE ORAL at 17:21

## 2021-06-08 RX ADMIN — OXYCODONE HYDROCHLORIDE 10 MG: 10 TABLET ORAL at 08:08

## 2021-06-08 RX ADMIN — OXYCODONE HYDROCHLORIDE 10 MG: 10 TABLET ORAL at 04:13

## 2021-06-08 RX ADMIN — OXYCODONE HYDROCHLORIDE 10 MG: 10 TABLET ORAL at 20:44

## 2021-06-08 RX ADMIN — QUETIAPINE FUMARATE 25 MG: 25 TABLET ORAL at 21:50

## 2021-06-08 RX ADMIN — KETOROLAC TROMETHAMINE 15 MG: 30 INJECTION, SOLUTION INTRAMUSCULAR at 11:00

## 2021-06-08 RX ADMIN — SENNOSIDES 8.6 MG: 8.6 TABLET, FILM COATED ORAL at 08:09

## 2021-06-08 RX ADMIN — POTASSIUM CHLORIDE 40 MEQ: 1500 TABLET, EXTENDED RELEASE ORAL at 12:24

## 2021-06-08 RX ADMIN — GABAPENTIN 300 MG: 300 CAPSULE ORAL at 08:09

## 2021-06-08 RX ADMIN — ACETAMINOPHEN 975 MG: 325 TABLET, FILM COATED ORAL at 21:49

## 2021-06-08 RX ADMIN — KETOROLAC TROMETHAMINE 15 MG: 30 INJECTION, SOLUTION INTRAMUSCULAR at 00:04

## 2021-06-08 RX ADMIN — TRAZODONE HYDROCHLORIDE 100 MG: 100 TABLET ORAL at 21:50

## 2021-06-08 RX ADMIN — GABAPENTIN 300 MG: 300 CAPSULE ORAL at 20:07

## 2021-06-08 RX ADMIN — PANTOPRAZOLE SODIUM 40 MG: 40 TABLET, DELAYED RELEASE ORAL at 06:15

## 2021-06-08 RX ADMIN — HYDROMORPHONE HYDROCHLORIDE 0.5 MG: 1 INJECTION, SOLUTION INTRAMUSCULAR; INTRAVENOUS; SUBCUTANEOUS at 17:26

## 2021-06-08 RX ADMIN — HYDROMORPHONE HYDROCHLORIDE 0.5 MG: 1 INJECTION, SOLUTION INTRAMUSCULAR; INTRAVENOUS; SUBCUTANEOUS at 22:37

## 2021-06-08 RX ADMIN — DOCUSATE SODIUM 100 MG: 100 CAPSULE ORAL at 08:09

## 2021-06-08 RX ADMIN — ENOXAPARIN SODIUM 40 MG: 40 INJECTION SUBCUTANEOUS at 08:09

## 2021-06-08 RX ADMIN — OXYCODONE HYDROCHLORIDE 10 MG: 10 TABLET ORAL at 12:26

## 2021-06-08 RX ADMIN — OXYCODONE HYDROCHLORIDE 10 MG: 10 TABLET ORAL at 16:42

## 2021-06-08 RX ADMIN — HYDROMORPHONE HYDROCHLORIDE 0.5 MG: 1 INJECTION, SOLUTION INTRAMUSCULAR; INTRAVENOUS; SUBCUTANEOUS at 07:22

## 2021-06-08 RX ADMIN — KETOROLAC TROMETHAMINE 15 MG: 30 INJECTION, SOLUTION INTRAMUSCULAR at 17:21

## 2021-06-08 RX ADMIN — GABAPENTIN 300 MG: 300 CAPSULE ORAL at 16:42

## 2021-06-08 RX ADMIN — HYDROMORPHONE HYDROCHLORIDE 0.5 MG: 1 INJECTION, SOLUTION INTRAMUSCULAR; INTRAVENOUS; SUBCUTANEOUS at 13:35

## 2021-06-08 RX ADMIN — TOPIRAMATE 50 MG: 25 TABLET, FILM COATED ORAL at 17:21

## 2021-06-08 RX ADMIN — ACETAMINOPHEN 975 MG: 325 TABLET, FILM COATED ORAL at 10:57

## 2021-06-08 NOTE — PROGRESS NOTES
Progress Note - Thoracic Surgery   Maynor Godfrey III 44 y o  male MRN: [id number]  Unit/Bed#: [facility] 408-01 Encounter: [id number]    Assessment:  43yo M POD#1 s/p robotic RULobectomy for NSCLC    R chest tube: minimal output (-8, AL 0-40 cc/min)    Plan:   Maintain R chest tube to water seal, may be able to remove later today vs tomorrow   Discontinue IVF   Discontinue gonzales   Downgrade to med/surg   Wean supplemental oxygen   PRN analgesia   OOB/ambulate   Incentive spirometry   Pulmonary toilet   DVT PPx    Subjective/Objective     Subjective:   No acute events overnight  Reports some right-sided chest discomfort and "gurgling" present  Also c/o SOB, although saturations have remains in high 90s on 2L NC  Objective:    Blood pressure 95/57, pulse 60, temperature 98 °F (36 7 °C), temperature source Oral, resp  rate 18, weight 73 9 kg (163 lb), SpO2 98 %  ,Body mass index is 21 51 kg/m²        Intake/Output Summary (Last 24 hours) at 6/8/2021 0644  Last data filed at 6/8/2021 3771  Gross per 24 hour   Intake 3313 ml   Output 3060 ml   Net 253 ml       Invasive Devices     Peripheral Intravenous Line            Peripheral IV 06/07/21 Left Arm less than 1 day    Peripheral IV 06/07/21 Left Arm less than 1 day          Drain            Chest Tube 1 Right Pleural 28 Fr  less than 1 day    Urethral Catheter Double-lumen 16 Fr  less than 1 day                Physical Exam:   Gen:  NAD  CV:  warm, well-perfused  Lungs: nl effort on 2L NC   R chest tube in place, AL 0-40 cc/min  Abd:  soft, NT/ND  Ext:  no CCE  Neuro: A&Ox3     Results from last 7 days   Lab Units 06/08/21  0445 06/07/21  1605 06/04/21  1007   WBC Thousand/uL 11 79* 10 81* 8 54   HEMOGLOBIN g/dL 12 1 12 4 16 2   HEMATOCRIT % 36 2* 37 2 49 6*   PLATELETS Thousands/uL 191 196 321     Results from last 7 days   Lab Units 06/08/21  0445 06/07/21  1605 06/04/21  1007   POTASSIUM mmol/L 3 8 4 3 4 4   CHLORIDE mmol/L 112* 112* 106   CO2 mmol/L 26 24 29 BUN mg/dL 8 9 12   CREATININE mg/dL 0 44* 0 66 0 80   CALCIUM mg/dL 8 5 8 2* 9 8     Results from last 7 days   Lab Units 06/04/21  1007   INR  0 85   PTT seconds 26

## 2021-06-08 NOTE — UTILIZATION REVIEW
Inpatient Admission Authorization Request   NOTIFICATION OF INPATIENT ADMISSION/INPATIENT AUTHORIZATION REQUEST   SERVICING FACILITY:   [facility]  Address: [address] [facility], [address] [place] [address]  Tax ID: [id number]  NPI: [id number]  Place of Service: Inpatient [address] [facility] Code: 24     ATTENDING PROVIDER:  Attending Name and NPI#: Duane HazMaxwell cohenma [[id number]]  Address: [address] [place] [address], [address] [place], [place] [id number]  Phone: [phone]     UTILIZATION REVIEW CONTACT:  Jacquie Ortiz Utilization   Network Utilization Review Department  Phone: [phone]  Fax: [phone]  Email: [email or web address]     PHYSICIAN ADVISORY SERVICES:  FOR [id number] REVIEW - MEDICAL NECESSITY DENIAL  Phone: [phone]  Fax: [phone]  Email: [email or web address]     TYPE OF REQUEST:  Inpatient Status     ADMISSION INFORMATION:  ADMISSION DATE/TIME: 6/7/21 1536  PATIENT DIAGNOSIS CODE/DESCRIPTION:  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

## 2021-06-08 NOTE — CASE MANAGEMENT
43yo male is POD#1 RUL lobectomy, has one chest tube  He is alert and oriented, independent ADLs, on disability, drives  He is currently very anxious and SOB, states this is normal for him  He resides in [place] with his wife Taina Clarke, phone [phone]  He also has a daughter Jojo at [phone]  Pt  Lives in a one story home with 0 LUIS  He denies having any DME  He has never had [address] or rehab  Hx of depression  He has no POA, he lists his wife as Health Care Agent  His PCP is Dr Tc Espinosa, uses [address] in [place]  He would like to use [facility] at discharge  He does have a Rx plan with his insurance  Wife will transport home  Discharge date tbd  Does not think he needs [facility]  CM reviewed d/c planning process including the following: identifying help at home, patient preference for d/c planning needs, Discharge Lounge, [facility] Meds to Bed program, availability of treatment team to discuss questions or concerns patient and/or family may have regarding understanding medications and recognizing signs and symptoms once discharged  CM also encouraged patient to follow up with all recommended appointments after discharge  Patient advised of importance for patient and family to participate in managing patients medical well being  Patient/caregiver received discharge checklist  Content reviewed  Patient/caregiver encouraged to participate in discharge plan of care prior to discharge home

## 2021-06-08 NOTE — PLAN OF CARE
Problem: PAIN - ADULT  Goal: Verbalizes/displays adequate comfort level or baseline comfort level  Description: Interventions:  - Encourage patient to monitor pain and request assistance  - Assess pain using appropriate pain scale  - Administer analgesics based on type and severity of pain and evaluate response  - Implement non-pharmacological measures as appropriate and evaluate response  - Consider cultural and social influences on pain and pain management  - Notify physician/advanced practitioner if interventions unsuccessful or patient reports new pain  6/7/2021 2325 by Hesham Pierce RN  Outcome: Progressing  6/7/2021 2324 by Hesham Pierce RN  Outcome: Progressing  6/7/2021 2324 by Hesham Pierce RN  Outcome: Progressing     Problem: INFECTION - ADULT  Goal: Absence or prevention of progression during hospitalization  Description: INTERVENTIONS:  - Assess and monitor for signs and symptoms of infection  - Monitor lab/diagnostic results  - Monitor all insertion sites, i e  indwelling lines, tubes, and drains  - Monitor endotracheal if appropriate and nasal secretions for changes in amount and color  - [place] appropriate cooling/warming therapies per order  - Administer medications as ordered  - Instruct and encourage patient and family to use good hand hygiene technique  - Identify and instruct in appropriate isolation precautions for identified infection/condition  6/7/2021 2325 by Hesham Pierce RN  Outcome: Progressing  6/7/2021 2324 by Hesham Pierce RN  Outcome: Progressing  6/7/2021 2324 by Hesham Pierce RN  Outcome: Progressing  Goal: Absence of fever/infection during neutropenic period  Description: INTERVENTIONS:  - Monitor WBC    6/7/2021 2325 by Hesham Pierce RN  Outcome: Progressing  6/7/2021 2324 by Hesham Pierce RN  Outcome: Progressing  6/7/2021 2324 by Hesham Pierce RN  Outcome: Progressing     Problem: SAFETY ADULT  Goal: Patient will remain free of falls  Description: INTERVENTIONS:  - Assess patient frequently for physical needs  -  Identify cognitive and physical deficits and behaviors that affect risk of falls    -  [place] fall precautions as indicated by assessment   - Educate patient/family on patient safety including physical limitations  - Instruct patient to call for assistance with activity based on assessment  - Modify environment to reduce risk of injury  - Consider OT/PT consult to assist with strengthening/mobility  6/7/2021 2325 by Dano Simpson RN  Outcome: Progressing  6/7/2021 2324 by Dano Simpson RN  Outcome: Progressing  6/7/2021 2324 by Dano Simpson RN  Outcome: Progressing  Goal: Maintain or return to baseline ADL function  Description: INTERVENTIONS:  -  Assess patient's ability to carry out ADLs; assess patient's baseline for ADL function and identify physical deficits which impact ability to perform ADLs (bathing, care of mouth/teeth, toileting, grooming, dressing, etc )  - Assess/evaluate cause of self-care deficits   - Assess range of motion  - Assess patient's mobility; develop plan if impaired  - Assess patient's need for assistive devices and provide as appropriate  - Encourage maximum independence but intervene and supervise when necessary  - Involve family in performance of ADLs  - Assess for home care needs following discharge   - Consider OT consult to assist with ADL evaluation and planning for discharge  - Provide patient education as appropriate  6/7/2021 2325 by Dano Simpson RN  Outcome: Progressing  6/7/2021 2324 by Dano Simpson RN  Outcome: Progressing  6/7/2021 2324 by Dano Simpson RN  Outcome: Progressing  Goal: Maintain or return mobility status to optimal level  Description: INTERVENTIONS:  - Assess patient's baseline mobility status (ambulation, transfers, stairs, etc )    - Identify cognitive and physical deficits and behaviors that affect mobility  - Identify mobility aids required to assist with transfers and/or ambulation (gait belt, sit-to-stand, lift, walker, cane, etc )  - [place] fall precautions as indicated by assessment  - Record patient progress and toleration of activity level on Mobility SBAR; progress patient to next Phase/Stage  - Instruct patient to call for assistance with activity based on assessment  - Consider rehabilitation consult to assist with strengthening/weightbearing, etc   6/7/2021 2325 by Tarun Bustos RN  Outcome: Progressing  6/7/2021 2324 by Tarun Bustos RN  Outcome: Progressing  6/7/2021 2324 by Tarun Bustos RN  Outcome: Progressing     Problem: DISCHARGE PLANNING  Goal: Discharge to home or other facility with appropriate resources  Description: INTERVENTIONS:  - Identify barriers to discharge w/patient and caregiver  - Arrange for needed discharge resources and transportation as appropriate  - Identify discharge learning needs (meds, wound care, etc )  - Arrange for interpretive services to assist at discharge as needed  - Refer to Case Management Department for coordinating discharge planning if the patient needs post-hospital services based on physician/advanced practitioner order or complex needs related to functional status, cognitive ability, or social support system  6/7/2021 2325 by Tarun Bustos RN  Outcome: Progressing  6/7/2021 2324 by Tarun Bustos RN  Outcome: Progressing  6/7/2021 2324 by Tarun Bustos RN  Outcome: Progressing     Problem: Knowledge Deficit  Goal: Patient/family/caregiver demonstrates understanding of disease process, treatment plan, medications, and discharge instructions  Description: Complete learning assessment and assess knowledge base    Interventions:  - Provide teaching at level of understanding  - Provide teaching via preferred learning methods  6/7/2021 2325 by Tarun Bustos RN  Outcome: Progressing  6/7/2021 2324 by Tarun Bustos RN  Outcome: Progressing  6/7/2021 2324 by Tarun Bustos RN  Outcome: Progressing     Problem: CARDIOVASCULAR - ADULT  Goal: Maintains optimal cardiac output and hemodynamic stability  Description: INTERVENTIONS:  - Monitor I/O, vital signs and rhythm  - Monitor for S/S and trends of decreased cardiac output  - Administer and titrate ordered vasoactive medications to optimize hemodynamic stability  - Assess quality of pulses, skin color and temperature  - Assess for signs of decreased coronary artery perfusion  - Instruct patient to report change in severity of symptoms  6/7/2021 2325 by Dano Simpson RN  Outcome: Progressing  6/7/2021 2324 by Dano Simpson RN  Outcome: Progressing  6/7/2021 2324 by Dano Simpson RN  Outcome: Progressing  Goal: Absence of cardiac dysrhythmias or at baseline rhythm  Description: INTERVENTIONS:  - Continuous cardiac monitoring, vital signs, obtain 12 lead EKG if ordered  - Administer antiarrhythmic and heart rate control medications as ordered  - Monitor electrolytes and administer replacement therapy as ordered  6/7/2021 2325 by Dano Simpson RN  Outcome: Progressing  6/7/2021 2324 by Dano Simpson RN  Outcome: Progressing  6/7/2021 2324 by Dano Simpson RN  Outcome: Progressing     Problem: RESPIRATORY - ADULT  Goal: Achieves optimal ventilation and oxygenation  Description: INTERVENTIONS:  - Assess for changes in respiratory status  - Assess for changes in mentation and behavior  - Position to facilitate oxygenation and minimize respiratory effort  - Oxygen administered by appropriate delivery if ordered  - Initiate smoking cessation education as indicated  - Encourage broncho-pulmonary hygiene including cough, deep breathe, Incentive Spirometry  - Assess the need for suctioning and aspirate as needed  - Assess and instruct to report SOB or any respiratory difficulty  - Respiratory Therapy support as indicated  6/7/2021 2325 by Dnao Simpson RN  Outcome: Progressing  6/7/2021 2324 by Dano Simpson RN  Outcome: Progressing  6/7/2021 2324 by Dano Simpson RN  Outcome: Progressing     Problem: GENITOURINARY - ADULT  Goal: Maintains or returns to baseline urinary function  Description: INTERVENTIONS:  - Assess urinary function  - Encourage oral fluids to ensure adequate hydration if ordered  - Administer IV fluids as ordered to ensure adequate hydration  - Administer ordered medications as needed  - Offer frequent toileting  - Follow urinary retention protocol if ordered  6/7/2021 2325 by Dano Simpson RN  Outcome: Progressing  6/7/2021 2324 by Dano Simpson RN  Outcome: Progressing  6/7/2021 2324 by Dano Simpson RN  Outcome: Progressing  Goal: Absence of urinary retention  Description: INTERVENTIONS:  - Assess patients ability to void and empty bladder  - Monitor I/O  - Bladder scan as needed  - Discuss with physician/AP medications to alleviate retention as needed  - Discuss catheterization for long term situations as appropriate  6/7/2021 2325 by Dano Simpson RN  Outcome: Progressing  6/7/2021 2324 by Dano Simpson RN  Outcome: Progressing  6/7/2021 2324 by Dano Simpson RN  Outcome: Progressing  Goal: Urinary catheter remains patent  Description: INTERVENTIONS:  - Assess patency of urinary catheter  - If patient has a chronic gonzales, consider changing catheter if non-functioning  - Follow guidelines for intermittent irrigation of non-functioning urinary catheter  6/7/2021 2325 by Dano Simpson RN  Outcome: Progressing  6/7/2021 2324 by Dano Simpson RN  Outcome: Progressing  6/7/2021 2324 by Dano Simpson RN  Outcome: Progressing     Problem: SKIN/TISSUE INTEGRITY - ADULT  Goal: Skin integrity remains intact  Description: INTERVENTIONS  - Identify patients at risk for skin breakdown  - Assess and monitor skin integrity  - Assess and monitor nutrition and hydration status  - Monitor labs (i e  albumin)  - Assess for incontinence   - Turn and reposition patient  - Assist with mobility/ambulation  - Relieve pressure over bony prominences  - Avoid friction and shearing  - Provide appropriate hygiene as needed including keeping skin clean and dry  - Evaluate need for skin moisturizer/barrier cream  - Collaborate with interdisciplinary team (i e  Nutrition, Rehabilitation, etc )   - Patient/family teaching  6/7/2021 2325 by Luna Roblero RN  Outcome: Progressing  6/7/2021 2324 by Luna Roblero RN  Outcome: Progressing  6/7/2021 2324 by Luna Roblero RN  Outcome: Progressing  Goal: Incision(s), wounds(s) or drain site(s) healing without S/S of infection  Description: INTERVENTIONS  - Assess and document risk factors for skin impairment   - Assess and document dressing, incision, wound bed, drain sites and surrounding tissue  - Consider nutrition services referral as needed  - Oral mucous membranes remain intact  - Provide patient/ family education  6/7/2021 2325 by Luna Roblero RN  Outcome: Progressing  6/7/2021 2324 by Luna Roblero RN  Outcome: Progressing  6/7/2021 2324 by Luna Roblero RN  Outcome: Progressing  Goal: Oral mucous membranes remain intact  Description: INTERVENTIONS  - Assess oral mucosa and hygiene practices  - Implement preventative oral hygiene regimen  - Implement oral medicated treatments as ordered  - Initiate Nutrition services referral as needed  6/7/2021 2325 by Luna Roblero RN  Outcome: Progressing  6/7/2021 2324 by Luna Roblero RN  Outcome: Progressing  6/7/2021 2324 by Luna Roblero RN  Outcome: Progressing     Problem: HEMATOLOGIC - ADULT  Goal: Maintains hematologic stability  Description: INTERVENTIONS  - Assess for signs and symptoms of bleeding or hemorrhage  - Monitor labs  - Administer supportive blood products/factors as ordered and appropriate  6/7/2021 2325 by Luna Roblero RN  Outcome: Progressing  6/7/2021 2324 by Luna Roblero RN  Outcome: Progressing  6/7/2021 2324 by Luna Roblero RN  Outcome: Progressing Problem: MUSCULOSKELETAL - ADULT  Goal: Maintain or return mobility to safest level of function  Description: INTERVENTIONS:  - Assess patient's ability to carry out ADLs; assess patient's baseline for ADL function and identify physical deficits which impact ability to perform ADLs (bathing, care of mouth/teeth, toileting, grooming, dressing, etc )  - Assess/evaluate cause of self-care deficits   - Assess range of motion  - Assess patient's mobility  - Assess patient's need for assistive devices and provide as appropriate  - Encourage maximum independence but intervene and supervise when necessary  - Involve family in performance of ADLs  - Assess for home care needs following discharge   - Consider OT consult to assist with ADL evaluation and planning for discharge  - Provide patient education as appropriate  6/7/2021 2325 by Armando Ford RN  Outcome: Progressing  6/7/2021 2324 by Armando Ford RN  Outcome: Progressing  6/7/2021 2324 by Armando Ford RN  Outcome: Progressing  Goal: Maintain proper alignment of affected body part  Description: INTERVENTIONS:  - Support, maintain and protect limb and body alignment  - Provide patient/ family with appropriate education  6/7/2021 2325 by Armando Ford RN  Outcome: Progressing  6/7/2021 2324 by Armando Ford RN  Outcome: Progressing  6/7/2021 2324 by Armando Ford RN  Outcome: Progressing

## 2021-06-08 NOTE — UTILIZATION REVIEW
Initial Clinical Review    Elective surgical procedure  Age/Sex: 44 y o  male  Surgery Date: 06/07/2021  Procedure: RIGHT UPPER LOBECTOMY LUNG THORACOSCOPIC W/ ROBOTICS (Right)  BRONCHOSCOPY FLEXIBLE (N/A)   1  Robotic right upper lobectomy  2  Mediastinal lymph node dissection  3  Bronchoscopy  4  Right T3 through T10 intercostal nerve block Exparel  Anesthesia: General  Operative Findings: Right Upper Lobe Mass  No definite evidence of lymph node invasion  POD#1 Progress Note (06/08/2021):  s/p robotic RULobectomy for NSCLC  Maintain right chest tube to water seal   Wean supplemental O2  Analgesia Scheduled and PRN  IS / Pulmonary Toilet        Admission Orders: Date/Time/Statement:   Admission Orders (From admission, onward)     Ordered        06/07/21 1536  Inpatient Admission  Once                   Orders Placed This Encounter   Procedures    Inpatient Admission     Standing Status:   Standing     Number of Occurrences:   1     Order Specific Question:   Level of Care     Answer:   Level 1 Stepdown [13]     Order Specific Question:   Estimated length of stay     Answer:   Inpatient Only Surgery     Vital Signs: /68 (BP Location: Left arm)   Pulse 72   Temp 99 4 °F (37 4 °C) (Oral)   Resp 16   Ht 6' 1" (1 854 m)   Wt 73 9 kg (163 lb)   SpO2 96%   BMI 21 51 kg/m²     Pertinent Labs/Diagnostic Test Results:   Results from last 7 days   Lab Units 06/07/21  0958   SARS-COV-2  Negative     Results from last 7 days   Lab Units 06/08/21  0445 06/07/21  1605 06/04/21  1007   WBC Thousand/uL 11 79* 10 81* 8 54   HEMOGLOBIN g/dL 12 1 12 4 16 2   HEMATOCRIT % 36 2* 37 2 49 6*   PLATELETS Thousands/uL 191 196 321     Results from last 7 days   Lab Units 06/08/21  0445 06/07/21  1605 06/04/21  1007   SODIUM mmol/L 142 140 140   POTASSIUM mmol/L 3 8 4 3 4 4   CHLORIDE mmol/L 112* 112* 106   CO2 mmol/L 26 24 29   ANION GAP mmol/L 4 4 5   BUN mg/dL 8 9 12   CREATININE mg/dL 0 44* 0 66 0 80   EGFR ml/min/1 73sq m 131 111 103   CALCIUM mg/dL 8 5 8 2* 9 8   MAGNESIUM mg/dL 2 3 2 1  --      Results from last 7 days   Lab Units 06/03/21  0726   POC GLUCOSE mg/dl 80     Results from last 7 days   Lab Units 06/08/21  0445 06/07/21  1605 06/04/21  1007   GLUCOSE RANDOM mg/dL 85 161* 108     Results from last 7 days   Lab Units 06/04/21  1007   PROTIME seconds 11 7   INR  0 85   PTT seconds 26     Diet: Regular  Mobility: OOB / ambulate  DVT Prophylaxis: PPX / [place] SCDs    Medications/Pain Control:   Scheduled Medications:  acetaminophen, 975 mg, Oral, Q6H  amLODIPine, 5 mg, Oral, Daily  docusate sodium, 100 mg, Oral, BID  enoxaparin, 40 mg, Subcutaneous, Daily  gabapentin, 300 mg, Oral, TID  ketorolac, 15 mg, Intravenous, Q6H JOVAN  nicotine, 1 patch, Transdermal, Q24H  pantoprazole, 40 mg, Oral, Early Morning  QUEtiapine, 25 mg, Oral, HS  senna, 1 tablet, Oral, Daily  topiramate, 50 mg, Oral, BID  traZODone, 100 mg, Oral, HS      Continuous IV Infusions:  lactated ringers infusion   Rate: 60 mL/hr Dose: 60 mL/hr  Freq: Continuous Route: IV  Last Dose: Stopped (06/08/21 0607)  Start: 06/07/21 1545 End: 06/08/21 0543    PRN Meds:  HYDROmorphone, 0 5 mg, Intravenous, Q4H PRN  X 1 dose 6/7;  X 1 dose 6/8  ondansetron, 4 mg, Intravenous, Q6H PRN  oxyCODONE, 10 mg, Oral, Q4H PRN  X 1 dose 6/7;  X 3 doses 6/8  oxyCODONE, 5 mg, Oral, Q4H PRN  polyethylene glycol, 17 g, Oral, Daily PRN        Network Utilization Review Department  ATTENTION: Please call with any questions or concerns to [phone] and carefully listen to the prompts so that you are directed to the right person  All voicemails are confidential   Jamila Esteban all requests for admission clinical reviews, approved or denied determinations and any other requests to dedicated fax number below belonging to the campus where the patient is receiving treatment   List of dedicated fax numbers for the Facilities:  FACILITY NAME UR FAX NUMBER   ADMISSION DENIALS (Administrative/Medical Necessity) [phone]   [address] (Maternity/NICU/Pediatrics) [address] [facility],7Th Floor [facility] [address] [place] [address]  [phone]   Asmita Fulton [id number] [place] [address] [address]eria [address] [facility] [address] [phone]

## 2021-06-08 NOTE — QUICK NOTE
Post-Op Check    Assessment: 44 y o  M s/p right upper lobectomy thoracoscopic with robotics    R CT to -8, 40-60mL/min air leak, 45cc serosanguinous output  IS 1250    Plan:  Regular diet  [email or web address]  Maintain R CT to waterseal  PRN analgesia  IS/Pulm toilet  DVT PPX    Subjective:  Patient complaining of pain in R chest, denies shortness of breath, no other complaints    Vitals:    06/07/21 1930   BP: 115/65   Pulse: 72   Resp: 18   Temp: 98 4 °F (36 9 °C)   SpO2: 98%       PE:  Gen: NAD, AAOx3  CV: RRR  Pulm: no resp distress on 2L NC, R CT to water seal, 40-60mL/min air leak on topaz, serosanguinous output, incisions c/d/i  Abd: Soft, non-distended, non-tender    Rosa Elena Marshall MD  General Surgery, PGY1

## 2021-06-09 ENCOUNTER — APPOINTMENT (INPATIENT)
Dept: RADIOLOGY | Facility: HOSPITAL | Age: 51
DRG: 165 | End: 2021-06-09
Payer: COMMERCIAL

## 2021-06-09 PROCEDURE — 71046 X-RAY EXAM CHEST 2 VIEWS: CPT

## 2021-06-09 PROCEDURE — 99024 POSTOP FOLLOW-UP VISIT: CPT | Performed by: THORACIC SURGERY (CARDIOTHORACIC VASCULAR SURGERY)

## 2021-06-09 RX ADMIN — HYDROMORPHONE HYDROCHLORIDE 0.5 MG: 1 INJECTION, SOLUTION INTRAMUSCULAR; INTRAVENOUS; SUBCUTANEOUS at 09:11

## 2021-06-09 RX ADMIN — ENOXAPARIN SODIUM 40 MG: 40 INJECTION SUBCUTANEOUS at 09:12

## 2021-06-09 RX ADMIN — ACETAMINOPHEN 975 MG: 325 TABLET, FILM COATED ORAL at 21:35

## 2021-06-09 RX ADMIN — QUETIAPINE FUMARATE 25 MG: 25 TABLET ORAL at 22:59

## 2021-06-09 RX ADMIN — GABAPENTIN 300 MG: 300 CAPSULE ORAL at 22:59

## 2021-06-09 RX ADMIN — OXYCODONE HYDROCHLORIDE 10 MG: 10 TABLET ORAL at 06:40

## 2021-06-09 RX ADMIN — GABAPENTIN 300 MG: 300 CAPSULE ORAL at 17:25

## 2021-06-09 RX ADMIN — ACETAMINOPHEN 975 MG: 325 TABLET, FILM COATED ORAL at 17:24

## 2021-06-09 RX ADMIN — KETOROLAC TROMETHAMINE 15 MG: 30 INJECTION, SOLUTION INTRAMUSCULAR at 12:42

## 2021-06-09 RX ADMIN — TOPIRAMATE 50 MG: 25 TABLET, FILM COATED ORAL at 09:11

## 2021-06-09 RX ADMIN — OXYCODONE HYDROCHLORIDE 10 MG: 10 TABLET ORAL at 21:39

## 2021-06-09 RX ADMIN — TRAZODONE HYDROCHLORIDE 100 MG: 100 TABLET ORAL at 22:59

## 2021-06-09 RX ADMIN — OXYCODONE HYDROCHLORIDE 10 MG: 10 TABLET ORAL at 12:55

## 2021-06-09 RX ADMIN — AMLODIPINE BESYLATE 5 MG: 5 TABLET ORAL at 09:11

## 2021-06-09 RX ADMIN — PANTOPRAZOLE SODIUM 40 MG: 40 TABLET, DELAYED RELEASE ORAL at 06:02

## 2021-06-09 RX ADMIN — TOPIRAMATE 50 MG: 25 TABLET, FILM COATED ORAL at 17:24

## 2021-06-09 RX ADMIN — HYDROMORPHONE HYDROCHLORIDE 0.5 MG: 1 INJECTION, SOLUTION INTRAMUSCULAR; INTRAVENOUS; SUBCUTANEOUS at 22:36

## 2021-06-09 RX ADMIN — GABAPENTIN 300 MG: 300 CAPSULE ORAL at 09:12

## 2021-06-09 RX ADMIN — KETOROLAC TROMETHAMINE 15 MG: 30 INJECTION, SOLUTION INTRAMUSCULAR at 06:02

## 2021-06-09 RX ADMIN — ACETAMINOPHEN 975 MG: 325 TABLET, FILM COATED ORAL at 09:11

## 2021-06-09 NOTE — PROGRESS NOTES
Progress Note - Thoracic Surgery   Durward Ring III 44 y o  male MRN: [id number]  Unit/Bed#: [facility] 408-01 Encounter: [id number]    Assessment:  43yo M POD#2 s/p robotic RULobectomy for NSCLC     R chest tube: 125 cc (-8, no air leak)    Plan:   Anticipate R chest tube removal today -- will check post-pull PA/lateral CXR    Pain control   Incentive spirometry   Pulmonary toilet   OOB/ambulate in halls   Possible discharge home later today    Subjective/Objective     Subjective:   No acute events overnight  Continues to have R-sided chest pain d/t chest tube  Denies SOB  Objective:    Blood pressure 124/60, pulse 63, temperature 98 2 °F (36 8 °C), temperature source Oral, resp  rate 18, height 6' 1" (1 854 m), weight 75 6 kg (166 lb 10 7 oz), SpO2 99 %  ,Body mass index is 21 99 kg/m²        Intake/Output Summary (Last 24 hours) at 6/9/2021 0647  Last data filed at 6/9/2021 0601  Gross per 24 hour   Intake 500 ml   Output 1650 ml   Net -1150 ml       Invasive Devices     Peripheral Intravenous Line            Peripheral IV 06/07/21 Left Arm 1 day          Drain            Chest Tube 1 Right Pleural 28 Fr  1 day                Physical Exam:   Gen:  NAD  CV:  warm, well-perfused  Lungs: nl effort on RA   R chest tube in place, no air leak  Abd:  soft, NT/ND  Ext:  no CCE  Neuro: A&Ox3     Results from last 7 days   Lab Units 06/08/21  0445 06/07/21  1605 06/04/21  1007   WBC Thousand/uL 11 79* 10 81* 8 54   HEMOGLOBIN g/dL 12 1 12 4 16 2   HEMATOCRIT % 36 2* 37 2 49 6*   PLATELETS Thousands/uL 191 196 321     Results from last 7 days   Lab Units 06/08/21  0445 06/07/21  1605 06/04/21  1007   POTASSIUM mmol/L 3 8 4 3 4 4   CHLORIDE mmol/L 112* 112* 106   CO2 mmol/L 26 24 29   BUN mg/dL 8 9 12   CREATININE mg/dL 0 44* 0 66 0 80   CALCIUM mg/dL 8 5 8 2* 9 8     Results from last 7 days   Lab Units 06/04/21  1007   INR  0 85   PTT seconds 26

## 2021-06-09 NOTE — DISCHARGE INSTRUCTIONS
Gently wash your incisions daily with soap and water, do not soak in a tub  Do not apply any lotions, creams, or ointments to incisions  No lifting over 10 lbs or strenuous exercise  No driving until seen at your post operative visit  The blue stitch will be removed at your post operative visit  Please obtain a pa/lat chest xray at a [facility] within 3 days of your follow up visit  You will be prescribed 3 medications to take at home, that should be taken exactly as directed  These have been shown to greatly improve post-operative pain:     1  Gabapentin 300mg tablet  Take 1 tablet by mouth 3x a day for 3 weeks  2    Tylenol 325mg tablet  Take 2 tablets by mouth, every 6 hours, for 1 week  3    Ibuprofen 200mg tablet  Take 3 tablets by mouth, 3x a day with meals for 1 week  You will also be prescribed a medication that you may take on an as needed basis:  Oxycodone 5mg tablet  Take 1-2 tablets every 4 hours as needed for pain

## 2021-06-09 NOTE — QUICK NOTE
Right chest tube removed in the usual fashion, U stitch tied down, occlusive dressing applied  Patient tolerated procedure well without immediate sign of complication, no new shortness of breath   PA/lateral CXR ordered for 10am

## 2021-06-10 VITALS
SYSTOLIC BLOOD PRESSURE: 125 MMHG | HEIGHT: 73 IN | WEIGHT: 160.05 LBS | TEMPERATURE: 98.2 F | HEART RATE: 67 BPM | BODY MASS INDEX: 21.21 KG/M2 | RESPIRATION RATE: 16 BRPM | DIASTOLIC BLOOD PRESSURE: 66 MMHG | OXYGEN SATURATION: 98 %

## 2021-06-10 PROCEDURE — 99024 POSTOP FOLLOW-UP VISIT: CPT | Performed by: THORACIC SURGERY (CARDIOTHORACIC VASCULAR SURGERY)

## 2021-06-10 RX ORDER — IBUPROFEN 600 MG/1
600 TABLET ORAL EVERY 8 HOURS SCHEDULED
Qty: 21 TABLET | Refills: 0 | Status: SHIPPED | OUTPATIENT
Start: 2021-06-10 | End: 2022-07-12

## 2021-06-10 RX ORDER — ACETAMINOPHEN 325 MG/1
650 TABLET ORAL EVERY 6 HOURS
Qty: 56 TABLET | Refills: 0 | Status: SHIPPED | OUTPATIENT
Start: 2021-06-10 | End: 2021-06-17

## 2021-06-10 RX ORDER — OXYCODONE HYDROCHLORIDE 5 MG/1
5 TABLET ORAL EVERY 4 HOURS PRN
Qty: 30 TABLET | Refills: 0 | Status: SHIPPED | OUTPATIENT
Start: 2021-06-10 | End: 2021-06-20

## 2021-06-10 RX ORDER — GABAPENTIN 300 MG/1
300 CAPSULE ORAL 3 TIMES DAILY
Qty: 63 CAPSULE | Refills: 0 | Status: SHIPPED | OUTPATIENT
Start: 2021-06-10 | End: 2021-08-10 | Stop reason: ALTCHOICE

## 2021-06-10 RX ORDER — DOCUSATE SODIUM 100 MG/1
100 CAPSULE, LIQUID FILLED ORAL 2 TIMES DAILY
Qty: 10 CAPSULE | Refills: 0 | Status: SHIPPED | OUTPATIENT
Start: 2021-06-10 | End: 2021-08-10 | Stop reason: ALTCHOICE

## 2021-06-10 RX ADMIN — OXYCODONE HYDROCHLORIDE 5 MG: 5 TABLET ORAL at 08:18

## 2021-06-10 RX ADMIN — AMLODIPINE BESYLATE 5 MG: 5 TABLET ORAL at 08:17

## 2021-06-10 RX ADMIN — PANTOPRAZOLE SODIUM 40 MG: 40 TABLET, DELAYED RELEASE ORAL at 06:16

## 2021-06-10 RX ADMIN — ENOXAPARIN SODIUM 40 MG: 40 INJECTION SUBCUTANEOUS at 08:18

## 2021-06-10 RX ADMIN — GABAPENTIN 300 MG: 300 CAPSULE ORAL at 08:17

## 2021-06-10 RX ADMIN — ACETAMINOPHEN 975 MG: 325 TABLET, FILM COATED ORAL at 06:00

## 2021-06-10 NOTE — PROGRESS NOTES
Progress Note - Thoracic Surgery   Andressa Leavitt III 44 y o  male MRN: [id number]  Unit/Bed#: [facility] 408-01 Encounter: [id number]    Assessment:  45yo M POD#3 s/p robotic RULobectomy for NSCLC    Post-pull CXR showed small R PTX following chest tube removal    Plan:   Diet as tolerated   Pain control   Incentive spirometry   OOB/ambulate   DVT PPx   Discharge home today    Subjective/Objective     Subjective:   No acute events overnight  Continues to have some R-sided chest pain, but improved since chest tube removal  Denies SOB  Wants to go home today  Objective:    Blood pressure 129/65, pulse 70, temperature 98 6 °F (37 °C), temperature source Oral, resp  rate 16, height 6' 1" (1 854 m), weight 72 6 kg (160 lb 0 9 oz), SpO2 95 %  ,Body mass index is 21 12 kg/m²        Intake/Output Summary (Last 24 hours) at 6/10/2021 0642  Last data filed at 6/10/2021 0601  Gross per 24 hour   Intake 920 ml   Output 450 ml   Net 470 ml       Invasive Devices     Peripheral Intravenous Line            Peripheral IV 06/07/21 Left Arm 2 days                Physical Exam:   Gen:  NAD  CV:  warm, well-perfused  Lungs: nl effort on RA  Chest: incision C/D/I   Abd:  soft, NT/ND  Ext:  no CCE  Neuro: A&Ox3     Results from last 7 days   Lab Units 06/08/21 0445 06/07/21  1605 06/04/21  1007   WBC Thousand/uL 11 79* 10 81* 8 54   HEMOGLOBIN g/dL 12 1 12 4 16 2   HEMATOCRIT % 36 2* 37 2 49 6*   PLATELETS Thousands/uL 191 196 321     Results from last 7 days   Lab Units 06/08/21  0445 06/07/21  1605 06/04/21  1007   POTASSIUM mmol/L 3 8 4 3 4 4   CHLORIDE mmol/L 112* 112* 106   CO2 mmol/L 26 24 29   BUN mg/dL 8 9 12   CREATININE mg/dL 0 44* 0 66 0 80   CALCIUM mg/dL 8 5 8 2* 9 8     Results from last 7 days   Lab Units 06/04/21  1007   INR  0 85   PTT seconds 26

## 2021-06-10 NOTE — DISCHARGE SUMMARY
Discharge Summary - Thoracic Surgery   Juanpablo Marte III 44 y o  male MRN: [id number]  Unit/Bed#: [facility] 408-01 Encounter: [id number]    Admission Date:   Admission Orders (From admission, onward)     Ordered        06/07/21 1536  Inpatient Admission  Once                      Discharge Date: 6/10/21    Admitting Diagnosis: 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]    Discharge Diagnosis: NSCLC of right upper lobe    Resolved Problems  Date Reviewed: 6/10/2021    None          Attending: Dr Marlon Fenton Physician(s): none    Procedures Performed: robotic right upper lobectomy 6/7/21    Pathology: final pending    Hospital Course: Mr Ector Yang III is a 44year old man with a biopsy proven right upper lobe non-small cell lung cancer who underwent the above procedure 6/7/21  He tolerated it well and was transferred to stepdown unit with chest tube in place to water seal  On POD#1, he was downgraded to medical surgical status, and his pain was controlled on oral pain medications  On POD#2, his chest tube was removed and pos-pull CXR was appropriate  On POD#3, he was deemed stable for discharge home with follow up in 2 weeks with Dr Shabazz Police  He was discharged on all ERAS medications  Condition at Discharge: good     Discharge instructions/Information to patient and family:   See after visit summary for information provided to patient and family  Provisions for Follow-Up Care:  See after visit summary for information related to follow-up care and any pertinent home health orders  Disposition: Home          Planned Readmission: No    Discharge Statement   I spent 30 minutes discharging the patient  This time was spent on the day of discharge  I had direct contact with the patient on the day of discharge  Additional documentation is required if more than 30 minutes were spent on discharge       Discharge Medications:  See after visit summary for reconciled discharge medications provided to patient and family

## 2021-06-11 ENCOUNTER — TRANSITIONAL CARE MANAGEMENT (OUTPATIENT)
Dept: INTERNAL MEDICINE CLINIC | Facility: CLINIC | Age: 51
End: 2021-06-11

## 2021-06-11 NOTE — UTILIZATION REVIEW
Notification of Discharge   This is a Notification of Discharge from our facility [address]  Please be advised that this patient has been discharge from our facility  Below you will find the admission and discharge date and time including the patients disposition  UTILIZATION REVIEW CONTACT:  Shaista Akers  Utilization   Network Utilization Review Department  Phone: [phone] x carefully listen to the prompts  All voicemails are confidential   Email: [email or web address]     PHYSICIAN ADVISORY SERVICES:  FOR [id number] REVIEW - MEDICAL NECESSITY DENIAL  Phone: [phone]  Fax: [phone]  Email: [email or web address]     PRESENTATION DATE: 6/7/2021  8:48 AM  OBERVATION ADMISSION DATE:   INPATIENT ADMISSION DATE: 6/7/21 1536   DISCHARGE DATE: 6/10/2021 11:48 AM  DISPOSITION: Home/Self Care Home/Self Care      IMPORTANT INFORMATION:  Send all requests for admission clinical reviews, approved or denied determinations and any other requests to dedicated fax number below belonging to the campus where the patient is receiving treatment   List of dedicated fax numbers:  [address] DENIALS (Administrative/Medical Necessity) [phone]   [address] (Maternity/NICU/Pediatrics) [phone]   [facility] [phone]   Margarito Driver [phone]   Ulises Moran [phone]    [address] [phone]   [facility]  [phone]   [address] [place] [address], [address]  [address] [facility] [address] [facility] [phone]

## 2021-06-14 ENCOUNTER — TELEPHONE (OUTPATIENT)
Dept: INTERNAL MEDICINE CLINIC | Facility: CLINIC | Age: 51
End: 2021-06-14

## 2021-06-14 ENCOUNTER — HOSPITAL ENCOUNTER (OUTPATIENT)
Dept: RADIOLOGY | Facility: HOSPITAL | Age: 51
Discharge: HOME | End: 2021-06-14
Payer: COMMERCIAL

## 2021-06-14 ENCOUNTER — TELEPHONE (OUTPATIENT)
Dept: CARDIAC SURGERY | Facility: CLINIC | Age: 51
End: 2021-06-14

## 2021-06-14 DIAGNOSIS — D38.1 NEOPLASM OF UNCERTAIN BEHAVIOR OF RIGHT UPPER LOBE OF LUNG: ICD-10-CM

## 2021-06-14 PROCEDURE — 71046 X-RAY EXAM CHEST 2 VIEWS: CPT

## 2021-06-14 NOTE — TELEPHONE ENCOUNTER
Kirt returned my earlier call  I asked him how he was doing and he said he 'felt like bubble wrap" popping  He said he felt this up to his clavicle  He did admit having SOB with exertion and talking  No SOB when at rest  He does have a cough productive for white mucus  Denies any fever or chills  I discussed above with Dr Sunshine Yao he would like Kirt to go now for a CXR  I advised him of same I also instructed him to go to ED for any increase in SOB, cough or temp >100 4  Kirt had c/o swelling and numbness below right breast  I reassured him this is normal to have swelling  I confirmed he's taking Gabapentin as directed and explained that this medicine will help with those sensations

## 2021-06-14 NOTE — TELEPHONE ENCOUNTER
----- Message from Sara Carter DO sent at 6/14/2021  6:17 AM EDT -----  Regarding: FW: Pre-Op/Post-Op Question  Contact: [phone]  Please call patient and have them call surgeon to report  ----- Message -----  From: Amrita Montes III  Sent: 6/13/2021  10:00 AM EDT  To: FRANCISCO ANTOINECARLOS [facility] Internal Med Clinical  Subject: RE: Pre-Op/Post-Op Question                      We did, haven't heard back  He sees you say before he has post op visit  It got smaller but now one forming in front   ----- Message -----  From: Sara Carter DO  Sent: 6/13/21, 7:39 AM  To: Jenny Dunlap III  Subject: RE: Pre-Op/Post-Op Question    You should make surgery aware since he just had a procedure       ----- Message -----       From:Prieto Dunlap III       Sent:6/12/2021 11:40 AM EDT         Claudy Mendez DO    Subject:Pre-Op/Post-Op Question    Sabrina Mullen has developed a hematoma  We are watching size  Should we be concerned?

## 2021-06-15 ENCOUNTER — APPOINTMENT (EMERGENCY)
Dept: RADIOLOGY | Facility: HOSPITAL | Age: 51
DRG: 201 | End: 2021-06-15
Attending: STUDENT IN AN ORGANIZED HEALTH CARE EDUCATION/TRAINING PROGRAM
Payer: COMMERCIAL

## 2021-06-15 ENCOUNTER — DOCUMENTATION (OUTPATIENT)
Dept: CARDIAC SURGERY | Facility: CLINIC | Age: 51
End: 2021-06-15

## 2021-06-15 ENCOUNTER — TELEPHONE (OUTPATIENT)
Dept: EMERGENCY DEPT | Facility: HOSPITAL | Age: 51
End: 2021-06-15

## 2021-06-15 ENCOUNTER — TELEPHONE (OUTPATIENT)
Dept: SURGICAL ONCOLOGY | Facility: CLINIC | Age: 51
End: 2021-06-15

## 2021-06-15 ENCOUNTER — HOSPITAL ENCOUNTER (INPATIENT)
Facility: HOSPITAL | Age: 51
LOS: 2 days | Discharge: HOME | DRG: 201 | End: 2021-06-17
Attending: EMERGENCY MEDICINE | Admitting: THORACIC SURGERY (CARDIOTHORACIC VASCULAR SURGERY)
Payer: COMMERCIAL

## 2021-06-15 DIAGNOSIS — D38.1 NEOPLASM OF UNCERTAIN BEHAVIOR OF RIGHT UPPER LOBE OF LUNG: ICD-10-CM

## 2021-06-15 DIAGNOSIS — J93.9 PNEUMOTHORAX, RIGHT: Primary | ICD-10-CM

## 2021-06-15 DIAGNOSIS — J93.9 PNEUMOTHORAX ON RIGHT: ICD-10-CM

## 2021-06-15 DIAGNOSIS — J93.9 PNEUMOTHORAX: Primary | ICD-10-CM

## 2021-06-15 LAB
ATRIAL RATE: 58 BPM
P AXIS: 97 DEGREES
PR INTERVAL: 144 MS
QRS AXIS: 42 DEGREES
QRSD INTERVAL: 132 MS
QT INTERVAL: 440 MS
QTC INTERVAL: 431 MS
T WAVE AXIS: 37 DEGREES
VENTRICULAR RATE: 58 BPM

## 2021-06-15 PROCEDURE — 99285 EMERGENCY DEPT VISIT HI MDM: CPT

## 2021-06-15 PROCEDURE — 99285 EMERGENCY DEPT VISIT HI MDM: CPT | Performed by: EMERGENCY MEDICINE

## 2021-06-15 PROCEDURE — 99152 MOD SED SAME PHYS/QHP 5/>YRS: CPT | Performed by: STUDENT IN AN ORGANIZED HEALTH CARE EDUCATION/TRAINING PROGRAM

## 2021-06-15 PROCEDURE — 93010 ELECTROCARDIOGRAM REPORT: CPT | Performed by: INTERNAL MEDICINE

## 2021-06-15 PROCEDURE — C1769 GUIDE WIRE: HCPCS

## 2021-06-15 PROCEDURE — NC001 PR NO CHARGE: Performed by: STUDENT IN AN ORGANIZED HEALTH CARE EDUCATION/TRAINING PROGRAM

## 2021-06-15 PROCEDURE — 93005 ELECTROCARDIOGRAM TRACING: CPT

## 2021-06-15 PROCEDURE — NC001 PR NO CHARGE: Performed by: THORACIC SURGERY (CARDIOTHORACIC VASCULAR SURGERY)

## 2021-06-15 PROCEDURE — 0W9930Z DRAINAGE OF RIGHT PLEURAL CAVITY WITH DRAINAGE DEVICE, PERCUTANEOUS APPROACH: ICD-10-PCS | Performed by: STUDENT IN AN ORGANIZED HEALTH CARE EDUCATION/TRAINING PROGRAM

## 2021-06-15 PROCEDURE — 32557 INSERT CATH PLEURA W/ IMAGE: CPT | Performed by: STUDENT IN AN ORGANIZED HEALTH CARE EDUCATION/TRAINING PROGRAM

## 2021-06-15 PROCEDURE — C1729 CATH, DRAINAGE: HCPCS

## 2021-06-15 PROCEDURE — 32557 INSERT CATH PLEURA W/ IMAGE: CPT

## 2021-06-15 PROCEDURE — 96374 THER/PROPH/DIAG INJ IV PUSH: CPT

## 2021-06-15 PROCEDURE — 99152 MOD SED SAME PHYS/QHP 5/>YRS: CPT

## 2021-06-15 RX ORDER — OXYCODONE HYDROCHLORIDE 5 MG/1
2.5 TABLET ORAL EVERY 4 HOURS PRN
Status: DISCONTINUED | OUTPATIENT
Start: 2021-06-15 | End: 2021-06-16

## 2021-06-15 RX ORDER — AMLODIPINE BESYLATE 5 MG/1
5 TABLET ORAL DAILY
Status: DISCONTINUED | OUTPATIENT
Start: 2021-06-16 | End: 2021-06-17 | Stop reason: HOSPADM

## 2021-06-15 RX ORDER — QUETIAPINE FUMARATE 25 MG/1
25 TABLET, FILM COATED ORAL
Status: DISCONTINUED | OUTPATIENT
Start: 2021-06-15 | End: 2021-06-17 | Stop reason: HOSPADM

## 2021-06-15 RX ORDER — OXYCODONE HYDROCHLORIDE 10 MG/1
10 TABLET ORAL EVERY 4 HOURS PRN
Status: DISCONTINUED | OUTPATIENT
Start: 2021-06-15 | End: 2021-06-15

## 2021-06-15 RX ORDER — LIDOCAINE WITH 8.4% SOD BICARB 0.9%(10ML)
SYRINGE (ML) INJECTION CODE/TRAUMA/SEDATION MEDICATION
Status: COMPLETED | OUTPATIENT
Start: 2021-06-15 | End: 2021-06-15

## 2021-06-15 RX ORDER — HEPARIN SODIUM 5000 [USP'U]/ML
5000 INJECTION, SOLUTION INTRAVENOUS; SUBCUTANEOUS EVERY 8 HOURS SCHEDULED
Status: DISCONTINUED | OUTPATIENT
Start: 2021-06-15 | End: 2021-06-17 | Stop reason: HOSPADM

## 2021-06-15 RX ORDER — ACETAMINOPHEN 325 MG/1
650 TABLET ORAL EVERY 6 HOURS PRN
Status: DISCONTINUED | OUTPATIENT
Start: 2021-06-15 | End: 2021-06-17 | Stop reason: HOSPADM

## 2021-06-15 RX ORDER — MIDAZOLAM HYDROCHLORIDE 2 MG/2ML
INJECTION, SOLUTION INTRAMUSCULAR; INTRAVENOUS CODE/TRAUMA/SEDATION MEDICATION
Status: COMPLETED | OUTPATIENT
Start: 2021-06-15 | End: 2021-06-15

## 2021-06-15 RX ORDER — FENTANYL CITRATE 50 UG/ML
INJECTION, SOLUTION INTRAMUSCULAR; INTRAVENOUS CODE/TRAUMA/SEDATION MEDICATION
Status: COMPLETED | OUTPATIENT
Start: 2021-06-15 | End: 2021-06-15

## 2021-06-15 RX ORDER — TRAZODONE HYDROCHLORIDE 100 MG/1
100 TABLET ORAL
Status: DISCONTINUED | OUTPATIENT
Start: 2021-06-15 | End: 2021-06-17 | Stop reason: HOSPADM

## 2021-06-15 RX ORDER — OXYCODONE HYDROCHLORIDE 5 MG/1
5 TABLET ORAL EVERY 4 HOURS PRN
Status: DISCONTINUED | OUTPATIENT
Start: 2021-06-15 | End: 2021-06-16

## 2021-06-15 RX ORDER — SODIUM CHLORIDE, SODIUM LACTATE, POTASSIUM CHLORIDE, CALCIUM CHLORIDE 600; 310; 30; 20 MG/100ML; MG/100ML; MG/100ML; MG/100ML
50 INJECTION, SOLUTION INTRAVENOUS CONTINUOUS
Status: DISPENSED | OUTPATIENT
Start: 2021-06-15 | End: 2021-06-15

## 2021-06-15 RX ORDER — HYDROMORPHONE HCL IN WATER/PF 6 MG/30 ML
0.2 PATIENT CONTROLLED ANALGESIA SYRINGE INTRAVENOUS
Status: DISCONTINUED | OUTPATIENT
Start: 2021-06-15 | End: 2021-06-15

## 2021-06-15 RX ORDER — DOCUSATE SODIUM 100 MG/1
100 CAPSULE, LIQUID FILLED ORAL 2 TIMES DAILY
Status: DISCONTINUED | OUTPATIENT
Start: 2021-06-15 | End: 2021-06-17 | Stop reason: HOSPADM

## 2021-06-15 RX ORDER — ONDANSETRON 2 MG/ML
4 INJECTION INTRAMUSCULAR; INTRAVENOUS EVERY 6 HOURS PRN
Status: DISCONTINUED | OUTPATIENT
Start: 2021-06-15 | End: 2021-06-17 | Stop reason: HOSPADM

## 2021-06-15 RX ORDER — GABAPENTIN 300 MG/1
300 CAPSULE ORAL 3 TIMES DAILY
Status: DISCONTINUED | OUTPATIENT
Start: 2021-06-15 | End: 2021-06-17 | Stop reason: HOSPADM

## 2021-06-15 RX ORDER — DIPHENHYDRAMINE HYDROCHLORIDE 50 MG/ML
INJECTION INTRAMUSCULAR; INTRAVENOUS CODE/TRAUMA/SEDATION MEDICATION
Status: COMPLETED | OUTPATIENT
Start: 2021-06-15 | End: 2021-06-15

## 2021-06-15 RX ORDER — OXYCODONE HYDROCHLORIDE 5 MG/1
5 TABLET ORAL EVERY 4 HOURS PRN
Status: DISCONTINUED | OUTPATIENT
Start: 2021-06-15 | End: 2021-06-15

## 2021-06-15 RX ADMIN — HYDROMORPHONE HYDROCHLORIDE 0.2 MG: 0.2 INJECTION, SOLUTION INTRAMUSCULAR; INTRAVENOUS; SUBCUTANEOUS at 21:02

## 2021-06-15 RX ADMIN — MIDAZOLAM 1 MG: 1 INJECTION INTRAMUSCULAR; INTRAVENOUS at 18:27

## 2021-06-15 RX ADMIN — QUETIAPINE FUMARATE 25 MG: 25 TABLET ORAL at 21:07

## 2021-06-15 RX ADMIN — OXYCODONE HYDROCHLORIDE 10 MG: 10 TABLET ORAL at 20:12

## 2021-06-15 RX ADMIN — TRAZODONE HYDROCHLORIDE 100 MG: 100 TABLET ORAL at 21:38

## 2021-06-15 RX ADMIN — Medication 10 ML: at 18:28

## 2021-06-15 RX ADMIN — FENTANYL CITRATE 50 MCG: 50 INJECTION INTRAMUSCULAR; INTRAVENOUS at 18:22

## 2021-06-15 RX ADMIN — DOCUSATE SODIUM 100 MG: 100 CAPSULE, LIQUID FILLED ORAL at 20:12

## 2021-06-15 RX ADMIN — SODIUM CHLORIDE, SODIUM LACTATE, POTASSIUM CHLORIDE, AND CALCIUM CHLORIDE 50 ML/HR: .6; .31; .03; .02 INJECTION, SOLUTION INTRAVENOUS at 20:14

## 2021-06-15 RX ADMIN — MIDAZOLAM 1 MG: 1 INJECTION INTRAMUSCULAR; INTRAVENOUS at 18:22

## 2021-06-15 RX ADMIN — FENTANYL CITRATE 50 MCG: 50 INJECTION INTRAMUSCULAR; INTRAVENOUS at 18:19

## 2021-06-15 RX ADMIN — MIDAZOLAM 1 MG: 1 INJECTION INTRAMUSCULAR; INTRAVENOUS at 18:19

## 2021-06-15 RX ADMIN — DIPHENHYDRAMINE HYDROCHLORIDE 50 MG: 50 INJECTION, SOLUTION INTRAMUSCULAR; INTRAVENOUS at 18:25

## 2021-06-15 RX ADMIN — FENTANYL CITRATE 50 MCG: 50 INJECTION INTRAMUSCULAR; INTRAVENOUS at 18:28

## 2021-06-15 RX ADMIN — MIDAZOLAM 1 MG: 1 INJECTION INTRAMUSCULAR; INTRAVENOUS at 18:30

## 2021-06-15 RX ADMIN — HEPARIN SODIUM 5000 UNITS: 5000 INJECTION INTRAVENOUS; SUBCUTANEOUS at 21:07

## 2021-06-15 RX ADMIN — FENTANYL CITRATE 50 MCG: 50 INJECTION INTRAMUSCULAR; INTRAVENOUS at 18:30

## 2021-06-15 RX ADMIN — GABAPENTIN 300 MG: 300 CAPSULE ORAL at 20:13

## 2021-06-15 NOTE — CONSULTS
e-Consult (IPC)  - Interventional Radiology  Eh Carcamo III 44 y o  male MRN: [id number]  Unit/Bed#: ED 14 Encounter: [id number]    IR has been consulted to evaluate the patient, determine the appropriate procedure, and whether or not a procedure can and should be performed regarding the care of Eh Carcamo III  We were consulted by thoracic concerning this patient, and to possibly perform a chest tube if medically appropriate for the patient  Consults  06/15/21      Assessment/Recommendation:   44 yoM PMH lobectomy p/w SOB, found to have R apical PTX  IR consulted for chest tube placement  We'll plan on chest tube placement this evening  Total time spent in review of data, discussion with requesting provider and rendering advice was 10 minutes  Patient or appropriate family member was verbally informed by thoracic of this consultative service on their behalf to provide more timely access to specialty care in lieu of an in person consultation  Verbal consent was obtained  Thank you for allowing Interventional Radiology to participate in the care of Eh Carcamo III  Please don't hesitate to call or TigerText us with any questions       Aleks Diaz MD

## 2021-06-15 NOTE — PROGRESS NOTES
I contacted Anthony Nevarez to assess level of SOB  Patient reports taking a shower today and getting very SOB with heat from warm water  He also feels abdomen has become distended  Above discussed with Dr Corita Galeazzi he has recommended patient come to [facility]  I notified Anthony Nevarez to have someone drive him to ED
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
## 2021-06-15 NOTE — TELEPHONE ENCOUNTER
Rose from Radiology called with Significant Findings in patient's Chest X-ray, ordered by Koby Connell

## 2021-06-15 NOTE — SEDATION DOCUMENTATION
Right chest tube placed by Dr Moy Mtz  Patient tolerated well and VSS  Patient to be admitted to 412  Report given to Sharri Alfred and patient taken by transport to room

## 2021-06-15 NOTE — PROGRESS NOTES
Interventional Radiology Preprocedure Note    History/Indication for procedure:   Shauna Mayer III is a 44 y o  male with a PMH of RUL lobectomy, p/w R apical PTX, who presents for chest tube insertion  Relevant past medical history:    Past Medical History:   Diagnosis Date    Ankylosing spondylitis of site in spine (HCC)     Anxiety     Chronic pain     BACK    Chronic pain disorder     Depression     Diverticulitis of colon     GERD (gastroesophageal reflux disease)     History of COVID-19 01/04/2021    Mild Symptoms- Lost of taste /smell    Hypertension     Pneumonia     Psychiatric disorder     ANXIETY    Rectal lesion     last assessed 1/12/15     Patient Active Problem List   Diagnosis    HLA B27 positive    Vitamin D insufficiency    Mild pulmonary hypertension ([facility] [address]    Anxiety    Erectile dysfunction of non-organic origin    Hyperlipidemia    Hypertension    Multiple lung nodules    Thoracic back pain    Gastroesophageal reflux disease    History of colon polyps    Cigarette nicotine dependence without complication    Oropharyngeal mass    Medicare annual wellness visit, subsequent    Tobacco use    Recurrent major depressive disorder, in partial remission ([facility] [address] )    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    Centrilobular emphysema (HCC)    Ankylosing spondylitis of cervical region (HCC)       /72   Pulse 58   Temp 98 2 °F (36 8 °C) (Tympanic)   Resp 19   SpO2 100%     Medications:    Inpatient Medications:     Scheduled Medications:      Infusions:  No current facility-administered medications for this encounter  PRN:      Outpatient Medications:  No current facility-administered medications on file prior to encounter       Current Outpatient Medications on File Prior to Encounter   Medication Sig Dispense Refill    amLODIPine (NORVASC) 5 mg tablet Take 1 tablet (5 mg total) by mouth daily 30 tablet 5    gabapentin (NEURONTIN) 300 mg capsule Take 1 capsule (300 mg total) by mouth 3 (three) times a day 63 capsule 0    omeprazole (PriLOSEC) 40 MG capsule Take 1 capsule (40 mg total) by mouth daily 90 capsule 3    oxyCODONE (ROXICODONE) 5 mg immediate release tablet Take 1 tablet (5 mg total) by mouth every 4 (four) hours as needed for moderate pain for up to 10 daysMax Daily Amount: 30 mg 30 tablet 0    QUEtiapine (SEROquel) 25 mg tablet Take 25 mg by mouth daily at bedtime      acetaminophen (TYLENOL) 325 mg tablet Take 2 tablets (650 mg total) by mouth every 6 (six) hours for 7 days 56 tablet 0    docusate sodium (COLACE) 100 mg capsule Take 1 capsule (100 mg total) by mouth 2 (two) times a day While taking narcotic pain medicine 10 capsule 0    ibuprofen (MOTRIN) 600 mg tablet Take 1 tablet (600 mg total) by mouth every 8 (eight) hours for 7 days 21 tablet 0    Multiple Vitamins-Minerals (MULTI COMPLETE) CAPS Take by mouth daily       nicotine (NICODERM CQ) 21 mg/24 hr TD 24 hr patch Place 1 patch on the skin every 24 hours 28 patch 0    topiramate (TOPAMAX) 50 MG tablet Take 50 mg by mouth 2 (two) times a day      traZODone (DESYREL) 50 mg tablet Take 2 tablets (100 mg total) by mouth daily at bedtime 90 tablet 3       No Known Allergies    Anticoagulants: none    ASA classification: ASA 3 - Patient with moderate systemic disease with functional limitations    Airway Assessment: II (hard and soft palate, upper portion of tonsils anduvula visible)    Relevant family history: None    Relevant review of systems: None    Prior sedation/anesthesia: yes    Can the patient lie flat?  Yes     NPO Status: yes    Labs:   CBC with diff:   Lab Results   Component Value Date    WBC 11 79 (H) 06/08/2021    HGB 12 1 06/08/2021    HCT 36 2 (L) 06/08/2021    MCV 98 06/08/2021     06/08/2021    MCH 32 8 06/08/2021    MCHC 33 4 06/08/2021    RDW 13 6 06/08/2021    MPV 8 5 (L) 06/08/2021    NRBC 0 05/15/2020     BMP/CMP:  Lab Results   Component Value Date     (L) 09/23/2015    K 3 8 06/08/2021    K 3 8 09/23/2015     (H) 06/08/2021    CL 97 (L) 09/23/2015    CO2 26 06/08/2021    CO2 30 9 09/23/2015    ANIONGAP 7 09/23/2015    BUN 8 06/08/2021    BUN 9 09/23/2015    CREATININE 0 44 (L) 06/08/2021    CREATININE 0 63 09/23/2015    GLUCOSE 113 09/23/2015    CALCIUM 8 5 06/08/2021    CALCIUM 9 0 09/23/2015    AST 33 05/19/2020    AST 26 06/24/2015     (H) 05/19/2020    ALT 29 06/24/2015    ALKPHOS 184 (H) 05/19/2020    ALKPHOS 79 06/24/2015    PROT 7 5 06/24/2015    BILITOT 0 6 06/24/2015    EGFR 131 06/08/2021   ,     Coags:   Lab Results   Component Value Date    PTT 26 06/04/2021    PTT 24 09/23/2015    INR 0 85 06/04/2021    INR 0 91 09/23/2015   ,          Relevant imaging studies:   Reviewed  Directed physical examination:  I agree with the physical exam performed on 6/15/21 and there are no additional changes  Assessment/Plan: For right chest tube placement  Sedation/Anesthesia plan: Moderate sedation will be used as needed for procedure      Consent with alternatives to the procedure, risks and benefits have been explained and discussed with the patient/patient's family: yes

## 2021-06-15 NOTE — PROGRESS NOTES
I contacted Vearl Sessions with his x-ray results after Dr Crispin Hunt reviewed imaging  He was notified of pneumothorax  He can walk across a room or out to the mailbox without becoming SOB  He notices SOB most often when talking in long sentences  He has an occasional cough at night  This hasn't increased since being home  Cough is occasionally productive for white mucus  Denies fever or chills  Using IS every 15 minutes able to achieve 1500ml  His appetite is good  Denies constipation  Pain well controlled  Incisions healing without warmth or redness  Patient notified to get CXR tomorrow am  He's to go to ED for increasing SOB or cough

## 2021-06-15 NOTE — SEDATION DOCUMENTATION
Patient last ate at 1 pm today and requesting sedation  Per Dr Lathan Sicard, ok to sedate patient for chest tube placement

## 2021-06-15 NOTE — TELEPHONE ENCOUNTER
Was contacted by the patient's daughter who is also the patient's healthcare proxy regarding the results of a chest x-ray that was done as an outpatient  Patient did have right lobectomy with apical pneumothorax that is bigger than previous chest x-ray  Patient was contacted by a nurse from CT surgery to get results of chest x-ray  Patient was told that if he was having worsening symptoms to go to the ER  Patient daughter contacted this ER asking should he come here or down to [facility] as that is where his surgery was  I did discuss with patient's daughter that if symptoms are worsening, that he would need evaluation  I also stated that I would Tiger text the provider that ordered the chest x-ray  [facility] [place] text any back that if the patient was having increased symptoms, he should be evaluated  This concurred with what I told the family  I did state that we would be happy to see this patient in the emergency department however if he was stable, recommendation was going down to [facility] where his surgery was done as we would likely transfer patient if he was symptomatic  Patient daughter verbalized understanding and will relay that information to the patient

## 2021-06-15 NOTE — BRIEF OP NOTE (RAD/CATH)
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

## 2021-06-16 ENCOUNTER — APPOINTMENT (INPATIENT)
Dept: RADIOLOGY | Facility: HOSPITAL | Age: 51
DRG: 201 | End: 2021-06-16
Payer: COMMERCIAL

## 2021-06-16 PROBLEM — J93.9 PNEUMOTHORAX ON RIGHT: Status: ACTIVE | Noted: 2021-06-16

## 2021-06-16 PROCEDURE — 99024 POSTOP FOLLOW-UP VISIT: CPT | Performed by: THORACIC SURGERY (CARDIOTHORACIC VASCULAR SURGERY)

## 2021-06-16 PROCEDURE — 71046 X-RAY EXAM CHEST 2 VIEWS: CPT

## 2021-06-16 RX ORDER — OXYCODONE HYDROCHLORIDE 10 MG/1
10 TABLET ORAL EVERY 4 HOURS PRN
Status: DISCONTINUED | OUTPATIENT
Start: 2021-06-16 | End: 2021-06-17 | Stop reason: HOSPADM

## 2021-06-16 RX ORDER — KETOROLAC TROMETHAMINE 10 MG/1
10 TABLET, FILM COATED ORAL EVERY 6 HOURS
Status: DISCONTINUED | OUTPATIENT
Start: 2021-06-16 | End: 2021-06-16

## 2021-06-16 RX ORDER — OXYCODONE HYDROCHLORIDE 5 MG/1
5 TABLET ORAL EVERY 4 HOURS PRN
Status: DISCONTINUED | OUTPATIENT
Start: 2021-06-16 | End: 2021-06-17 | Stop reason: HOSPADM

## 2021-06-16 RX ORDER — KETOROLAC TROMETHAMINE 30 MG/ML
15 INJECTION, SOLUTION INTRAMUSCULAR; INTRAVENOUS EVERY 6 HOURS SCHEDULED
Status: DISCONTINUED | OUTPATIENT
Start: 2021-06-16 | End: 2021-06-17 | Stop reason: HOSPADM

## 2021-06-16 RX ORDER — METHOCARBAMOL 500 MG/1
500 TABLET, FILM COATED ORAL EVERY 6 HOURS SCHEDULED
Status: DISCONTINUED | OUTPATIENT
Start: 2021-06-16 | End: 2021-06-17 | Stop reason: HOSPADM

## 2021-06-16 RX ADMIN — GABAPENTIN 300 MG: 300 CAPSULE ORAL at 21:07

## 2021-06-16 RX ADMIN — ACETAMINOPHEN 650 MG: 325 TABLET, FILM COATED ORAL at 21:07

## 2021-06-16 RX ADMIN — OXYCODONE HYDROCHLORIDE 10 MG: 10 TABLET ORAL at 18:19

## 2021-06-16 RX ADMIN — ACETAMINOPHEN 650 MG: 325 TABLET, FILM COATED ORAL at 11:32

## 2021-06-16 RX ADMIN — METHOCARBAMOL 500 MG: 500 TABLET, FILM COATED ORAL at 11:26

## 2021-06-16 RX ADMIN — AMLODIPINE BESYLATE 5 MG: 5 TABLET ORAL at 09:34

## 2021-06-16 RX ADMIN — OXYCODONE HYDROCHLORIDE 5 MG: 5 TABLET ORAL at 09:35

## 2021-06-16 RX ADMIN — OXYCODONE HYDROCHLORIDE 5 MG: 5 TABLET ORAL at 05:35

## 2021-06-16 RX ADMIN — KETOROLAC TROMETHAMINE 15 MG: 30 INJECTION, SOLUTION INTRAMUSCULAR; INTRAVENOUS at 17:46

## 2021-06-16 RX ADMIN — DOCUSATE SODIUM 100 MG: 100 CAPSULE, LIQUID FILLED ORAL at 09:35

## 2021-06-16 RX ADMIN — OXYCODONE HYDROCHLORIDE 10 MG: 10 TABLET ORAL at 22:10

## 2021-06-16 RX ADMIN — DOCUSATE SODIUM 100 MG: 100 CAPSULE, LIQUID FILLED ORAL at 17:46

## 2021-06-16 RX ADMIN — GABAPENTIN 300 MG: 300 CAPSULE ORAL at 17:46

## 2021-06-16 RX ADMIN — GABAPENTIN 300 MG: 300 CAPSULE ORAL at 09:34

## 2021-06-16 RX ADMIN — OXYCODONE HYDROCHLORIDE 10 MG: 10 TABLET ORAL at 14:09

## 2021-06-16 RX ADMIN — METHOCARBAMOL 500 MG: 500 TABLET, FILM COATED ORAL at 06:13

## 2021-06-16 RX ADMIN — METHOCARBAMOL 500 MG: 500 TABLET, FILM COATED ORAL at 17:46

## 2021-06-16 RX ADMIN — OXYCODONE HYDROCHLORIDE 5 MG: 5 TABLET ORAL at 00:03

## 2021-06-16 RX ADMIN — QUETIAPINE FUMARATE 25 MG: 25 TABLET ORAL at 21:07

## 2021-06-16 RX ADMIN — HEPARIN SODIUM 5000 UNITS: 5000 INJECTION INTRAVENOUS; SUBCUTANEOUS at 05:35

## 2021-06-16 RX ADMIN — HEPARIN SODIUM 5000 UNITS: 5000 INJECTION INTRAVENOUS; SUBCUTANEOUS at 21:07

## 2021-06-16 RX ADMIN — HEPARIN SODIUM 5000 UNITS: 5000 INJECTION INTRAVENOUS; SUBCUTANEOUS at 14:10

## 2021-06-16 RX ADMIN — KETOROLAC TROMETHAMINE 10 MG: 10 TABLET, FILM COATED ORAL at 11:26

## 2021-06-16 RX ADMIN — TRAZODONE HYDROCHLORIDE 100 MG: 100 TABLET ORAL at 21:07

## 2021-06-16 NOTE — PLAN OF CARE
Problem: PAIN - ADULT  Goal: Verbalizes/displays adequate comfort level or baseline comfort level  Description: Interventions:  - Encourage patient to monitor pain and request assistance  - Assess pain using appropriate pain scale  - Administer analgesics based on type and severity of pain and evaluate response  - Implement non-pharmacological measures as appropriate and evaluate response  - Consider cultural and social influences on pain and pain management  - Notify physician/advanced practitioner if interventions unsuccessful or patient reports new pain  Outcome: Progressing     Problem: INFECTION - ADULT  Goal: Absence or prevention of progression during hospitalization  Description: INTERVENTIONS:  - Assess and monitor for signs and symptoms of infection  - Monitor lab/diagnostic results  - Monitor all insertion sites, i e  indwelling lines, tubes, and drains  - Monitor endotracheal if appropriate and nasal secretions for changes in amount and color  - [place] appropriate cooling/warming therapies per order  - Administer medications as ordered  - Instruct and encourage patient and family to use good hand hygiene technique  - Identify and instruct in appropriate isolation precautions for identified infection/condition  Outcome: Progressing  Goal: Absence of fever/infection during neutropenic period  Description: INTERVENTIONS:  - Monitor WBC    Outcome: Progressing     Problem: SAFETY ADULT  Goal: Patient will remain free of falls  Description: INTERVENTIONS:  - Educate patient/family on patient safety including physical limitations  - Instruct patient to call for assistance with activity   - Consult OT/PT to assist with strengthening/mobility   - Keep Call bell within reach  - Keep bed low and locked with side rails adjusted as appropriate  - Keep care items and personal belongings within reach  - Initiate and maintain comfort rounds  - Make Fall Risk Sign visible to staff  - Offer Toileting every 2 Hours, in advance of need  - Obtain necessary fall risk management equipment: walker  - Apply yellow socks and bracelet for high fall risk patients  - Consider moving patient to room near nurses station  Outcome: Progressing  Goal: Maintain or return to baseline ADL function  Description: INTERVENTIONS:  -  Assess patient's ability to carry out ADLs; assess patient's baseline for ADL function and identify physical deficits which impact ability to perform ADLs (bathing, care of mouth/teeth, toileting, grooming, dressing, etc )  - Assess/evaluate cause of self-care deficits   - Assess range of motion  - Assess patient's mobility; develop plan if impaired  - Assess patient's need for assistive devices and provide as appropriate  - Encourage maximum independence but intervene and supervise when necessary  - Involve family in performance of ADLs  - Assess for home care needs following discharge   - Consider OT consult to assist with ADL evaluation and planning for discharge  - Provide patient education as appropriate  Outcome: Progressing  Goal: Maintains/Returns to pre admission functional level  Description: INTERVENTIONS:  - Perform BMAT or MOVE assessment daily    - Set and communicate daily mobility goal to care team and patient/family/caregiver     - Collaborate with rehabilitation services on mobility goals if consulted  - Dangle patient 5 times a day  - Stand patient 5 times a day  - Ambulate patient 3 times a day  - Out of bed to chair 3 times a day   - Out of bed for meals 3 times a day  - Out of bed for toileting  - Record patient progress and toleration of activity level   Outcome: Progressing     Problem: DISCHARGE PLANNING  Goal: Discharge to home or other facility with appropriate resources  Description: INTERVENTIONS:  - Identify barriers to discharge w/patient and caregiver  - Arrange for needed discharge resources and transportation as appropriate  - Identify discharge learning needs (meds, wound care, etc )  - Arrange for interpretive services to assist at discharge as needed  - Refer to Case Management Department for coordinating discharge planning if the patient needs post-hospital services based on physician/advanced practitioner order or complex needs related to functional status, cognitive ability, or social support system  Outcome: Progressing     Problem: Knowledge Deficit  Goal: Patient/family/caregiver demonstrates understanding of disease process, treatment plan, medications, and discharge instructions  Description: Complete learning assessment and assess knowledge base    Interventions:  - Provide teaching at level of understanding  - Provide teaching via preferred learning methods  Outcome: Progressing

## 2021-06-16 NOTE — UTILIZATION REVIEW
Initial Clinical Review    Admission: Date/Time/Statement:   Admission Orders (From admission, onward)     Ordered        06/15/21 1824  Inpatient Admission  Once                   Orders Placed This Encounter   Procedures    Inpatient Admission     Standing Status:   Standing     Number of Occurrences:   1     Order Specific Question:   Level of Care     Answer:   Med Surg [16]     Order Specific Question:   Estimated length of stay     Answer:   More than 2 Midnights     Order Specific Question:   Certification     Answer:   I certify that inpatient services are medically necessary for this patient for a duration of greater than two midnights  See H&P and MD Progress Notes for additional information about the patient's course of treatment  ED Arrival Information     Expected Arrival Acuity    - 6/15/2021 16:27 Emergent         Means of arrival Escorted by Service Admission type    Walk-In Self Thoracic Surgery Emergency         Arrival complaint    post op complications        Chief Complaint   Patient presents with    Shortness of Breath     pt with confirmed right sided pneumothorax via xray yesterday  pt recent lobectomy procedure (upper right lobe) on 6/7/21 per pt     Initial Presentation:   50y Male to ED presents with enlarged right-sided pneumothorax seen on outpatient CXR yesterday  PMH for RUL NSCLC s/p  Right upper lobectomy (06/07/2021)  Recent hospitalization 6/7 to 6/10 with worsening right-sided subcutaneous emphysema, chest pain, and dyspnea on exertion that began yesterday  F/u CXR which showed an enlarged pneumothorax compared to the previous chest x-ray just prior to discharge  Admit Inpatient level of care for Right upper lobectomy (6/7/21) for RUL NSCLC, now with enlarged right-sided pneumothorax  IR consult for right-sided chest tube placement  Incentive spirometry  Pain control  6/15 IR cons;  for chest tube placement this evening     6/15 Chest tube placed today     Date: 6/16 Day 2:   Progress notes; Keep right-sided pigtail in place to suction for today  Check CXR this am  Pain control  Incentive spirometry  States pain is worst than previous post-op pain  ED Triage Vitals   Temperature Pulse Respirations Blood Pressure SpO2   06/15/21 1647 06/15/21 1647 06/15/21 1647 06/15/21 1647 06/15/21 1647   98 2 °F (36 8 °C) 63 18 145/81 99 %      Temp Source Heart Rate Source Patient Position - Orthostatic VS BP Location FiO2 (%)   06/15/21 1647 06/15/21 1647 06/15/21 1647 06/15/21 1647 --   Tympanic Monitor Sitting Right arm       Pain Score       06/15/21 1730       8          Wt Readings from Last 1 Encounters:   06/10/21 72 6 kg (160 lb 0 9 oz)     Additional Vital Signs:   06/16/21 0700  98 6 °F (37 °C)  59  20  125/68  91  100 %  --  --  Nasal cannula  Sitting   06/16/21 0325  98 9 °F (37 2 °C)  55  18  110/61  80  99 %             06/15/21 1915  --  58  20  128/71  94  98 %  --  --  --  --   06/15/21 1900  --  58  20  127/69  93  99 %  --  --  --  --   06/15/21 1838  --  58  18  126/68  90  100 %  28  2 L/min  Nasal cannula  --   06/15/21 1835  --  58  18  124/73  95  100 %  28  2 L/min  Nasal cannula       Pertinent Labs/Diagnostic Test Results:   6/14   CXR - Right apical pneumothorax appearing larger compared to the prior study  6/16   CXR - S/p right chest tube placement with no residual pneumothorax visualized      ED Treatment:   Medication Administration from 06/15/2021 1627 to 06/15/2021 1852       Date/Time Order Dose Route Action     06/15/2021 1830 midazolam (VERSED) injection 1 mg Intravenous Given     06/15/2021 1827 midazolam (VERSED) injection 1 mg Intravenous Given     06/15/2021 1822 midazolam (VERSED) injection 1 mg Intravenous Given     06/15/2021 1819 midazolam (VERSED) injection 1 mg Intravenous Given     06/15/2021 1830 fentanyl citrate (PF) 100 MCG/2ML 50 mcg Intravenous Given     06/15/2021 1828 fentanyl citrate (PF) 100 MCG/2ML 50 mcg Intravenous Given 06/15/2021 1822 fentanyl citrate (PF) 100 MCG/2ML 50 mcg Intravenous Given     06/15/2021 1819 fentanyl citrate (PF) 100 MCG/2ML 50 mcg Intravenous Given     06/15/2021 1825 diphenhydrAMINE (BENADRYL) injection 50 mg Intravenous Given     06/15/2021 1828 lidocaine 1% buffered 10 mL Infiltration Given        Past Medical History:   Diagnosis Date    Ankylosing spondylitis of site in spine (HCC)     Anxiety     Chronic pain     BACK    Chronic pain disorder     Depression     Diverticulitis of colon     GERD (gastroesophageal reflux disease)     History of COVID-19 01/04/2021    Mild Symptoms- Lost of taste /smell    Hypertension     Pneumonia     Psychiatric disorder     ANXIETY    Rectal lesion     last assessed 1/12/15     Present on Admission:  **None**      Admitting Diagnosis: Shortness of breath [R06 02]  Pneumothorax [J93 9]  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]  Age/Sex: 44 y o  male     Admission Orders:  Scheduled Medications:  amLODIPine, 5 mg, Oral, Daily  docusate sodium, 100 mg, Oral, BID  gabapentin, 300 mg, Oral, TID  heparin (porcine), 5,000 Units, Subcutaneous, Q8H JOVAN  ketorolac, 10 mg, Oral, Q6H  methocarbamol, 500 mg, Oral, Q6H JOVAN  QUEtiapine, 25 mg, Oral, HS  traZODone, 100 mg, Oral, HS      Continuous IV Infusions: None     PRN Meds:  acetaminophen, 650 mg, Oral, Q6H PRN  ondansetron, 4 mg, Intravenous, Q6H PRN  oxyCODONE, 2 5 mg, Oral, Q4H PRN  oxyCODONE, 5 mg, Oral, Q4H PRN 6/16 x3      Chest tube to suction  INPATIENT CONSULT TO IR    Network Utilization Review Department  ATTENTION: Please call with any questions or concerns to [phone] and carefully listen to the prompts so that you are directed to the right person   All voicemails are confidential   Mary Alice Hilliard all requests for admission clinical reviews, approved or denied determinations and any other requests to dedicated fax number below belonging to the campus where the patient is receiving treatment   List of dedicated fax numbers for the Facilities:  [address] [place] [address] DENIALS (Administrative/Medical Necessity) [phone]   [address] (Maternity/NICU/Pediatrics) [phone]   [address] [place] [address] Dr Elena Fulton [id number] [address] [place] [address] Valeria Joe Solomon 1481 [address] [facility] [phone]

## 2021-06-16 NOTE — PROGRESS NOTES
Progress Note - Thoracic Surgery   Kraig Bumpers III 44 y o  male MRN: [id number]  Unit/Bed#: [facility] 412-01 Encounter: [id number]    Assessment:  43yo M s/p right upper lobectomy (6/7/21) for RUL NSCLC, now with enlarged right-sided pneumothorax s/p IR chest tube placement    R chest tube: no output, -20 suction, no air leak    Plan:   Maintain R chest tube to suction   PA/lateral CXR this morning   Pain control   Incentive spirometry   DVT PPx    Subjective/Objective     Subjective:   No acute events overnight  Continues to have pain at chest tube site  States pain is worst than previous post-op pain  Objective:    Blood pressure 110/61, pulse 55, temperature 98 9 °F (37 2 °C), temperature source Oral, resp  rate 18, SpO2 99 %  ,There is no height or weight on file to calculate BMI        Intake/Output Summary (Last 24 hours) at 6/16/2021 0658  Last data filed at 6/16/2021 0656  Gross per 24 hour   Intake 88 33 ml   Output 1035 ml   Net -946 67 ml       Invasive Devices     Peripheral Intravenous Line            Peripheral IV 06/15/21 Left Forearm <1 day    Peripheral IV 06/15/21 Right Antecubital <1 day          Drain            Chest Tube 1 Right Second intercostal space 10 Fr  <1 day                Physical Exam:   Gen:  NAD  CV:  warm, well-perfused  Lungs: nl effort on 2L NC   R chest tube in place, no air leak  Chest: small amount of crepitus present  Abd:  soft, NT/ND  Ext:  no CCE  Neuro: A&Ox3               Invalid input(s): LABGLOM

## 2021-06-16 NOTE — UTILIZATION REVIEW
Inpatient Admission Authorization Request   NOTIFICATION OF INPATIENT ADMISSION/INPATIENT AUTHORIZATION REQUEST   SERVICING FACILITY:   [facility]  Address: [address], [address] [place] [address]  Tax ID: [id number]  NPI: [id number]  Place of Service: Inpatient [address] [facility] Code: 24     ATTENDING PROVIDER:  Attending Name and NPI#: Lila Moeller, Venkata Youngblood [[id number]]  Address: [address] [place] [address], [address] [place] [address]  Phone: [phone]     UTILIZATION REVIEW CONTACT:  Yuliana Zhang Utilization   Network Utilization Review Department  Phone: [phone]  Fax: [phone]  Email: [email or web address]     PHYSICIAN ADVISORY SERVICES:  FOR VLOM-EH-IQUX REVIEW - MEDICAL NECESSITY DENIAL  Phone: [phone]  Fax: [phone]  Email: [email or web address]     TYPE OF REQUEST:  Inpatient Status     ADMISSION INFORMATION:  ADMISSION DATE/TIME: 6/15/21  6:24 PM  PATIENT DIAGNOSIS CODE/DESCRIPTION:  Shortness of breath [R06 02]  Pneumothorax [J93 9]  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

## 2021-06-16 NOTE — CASE MANAGEMENT
43yo male is readmitted with right pneumothorax  Just discharged 6/10/21 after RUL lobectomy  Now has right pigtail  On 6/7/21:  Case Management         43yo male is POD#1 RUL lobectomy, has one chest tube  He is alert and oriented, independent ADLs, on disability, drives  He is currently very anxious and SOB, states this is normal for him  He resides in [facility] with his wife Uli Malagon, phone [phone]  He also has a daughter Jojo at [phone]  Pt  Lives in a one story home with 0 LUIS  He denies having any DME  He has never had [address] or rehab  Hx of depression  He has no POA, he lists his wife as Health Care Agent  His PCP is Dr Carey Montalvo, uses [address] in [facility]  He would like to use [facility] at discharge  He does have a Rx plan with his insurance  Wife will transport home  Discharge date tbd  Does not think he needs HHC       CM reviewed d/c planning process including the following: identifying help at home, patient preference for d/c planning needs, Discharge Lounge, [facility] Meds to Bed program, availability of treatment team to discuss questions or concerns patient and/or family may have regarding understanding medications and recognizing signs and symptoms once discharged  CM also encouraged patient to follow up with all recommended appointments after discharge  Patient advised of importance for patient and family to participate in managing patients medical well being      Patient/caregiver received discharge checklist  Content reviewed  Patient/caregiver encouraged to participate in discharge plan of care prior to discharge home  No changes re  Past interview  Pt  Cooperative and pleasant  No HHC needs at this time  Wife will transport when ready

## 2021-06-17 ENCOUNTER — APPOINTMENT (INPATIENT)
Dept: RADIOLOGY | Facility: HOSPITAL | Age: 51
DRG: 201 | End: 2021-06-17
Payer: COMMERCIAL

## 2021-06-17 VITALS
RESPIRATION RATE: 16 BRPM | HEART RATE: 65 BPM | OXYGEN SATURATION: 99 % | DIASTOLIC BLOOD PRESSURE: 90 MMHG | TEMPERATURE: 98.6 F | SYSTOLIC BLOOD PRESSURE: 135 MMHG

## 2021-06-17 PROCEDURE — 99024 POSTOP FOLLOW-UP VISIT: CPT | Performed by: THORACIC SURGERY (CARDIOTHORACIC VASCULAR SURGERY)

## 2021-06-17 PROCEDURE — NC001 PR NO CHARGE: Performed by: THORACIC SURGERY (CARDIOTHORACIC VASCULAR SURGERY)

## 2021-06-17 PROCEDURE — 71045 X-RAY EXAM CHEST 1 VIEW: CPT

## 2021-06-17 PROCEDURE — 71046 X-RAY EXAM CHEST 2 VIEWS: CPT

## 2021-06-17 RX ORDER — PANTOPRAZOLE SODIUM 40 MG/1
40 TABLET, DELAYED RELEASE ORAL
Status: DISCONTINUED | OUTPATIENT
Start: 2021-06-17 | End: 2021-06-17 | Stop reason: HOSPADM

## 2021-06-17 RX ADMIN — DOCUSATE SODIUM 100 MG: 100 CAPSULE, LIQUID FILLED ORAL at 17:12

## 2021-06-17 RX ADMIN — PANTOPRAZOLE SODIUM 40 MG: 40 TABLET, DELAYED RELEASE ORAL at 10:20

## 2021-06-17 RX ADMIN — METHOCARBAMOL 500 MG: 500 TABLET, FILM COATED ORAL at 05:11

## 2021-06-17 RX ADMIN — OXYCODONE HYDROCHLORIDE 10 MG: 10 TABLET ORAL at 07:34

## 2021-06-17 RX ADMIN — KETOROLAC TROMETHAMINE 15 MG: 30 INJECTION, SOLUTION INTRAMUSCULAR; INTRAVENOUS at 05:10

## 2021-06-17 RX ADMIN — DOCUSATE SODIUM 100 MG: 100 CAPSULE, LIQUID FILLED ORAL at 10:20

## 2021-06-17 RX ADMIN — METHOCARBAMOL 500 MG: 500 TABLET, FILM COATED ORAL at 17:12

## 2021-06-17 RX ADMIN — KETOROLAC TROMETHAMINE 15 MG: 30 INJECTION, SOLUTION INTRAMUSCULAR; INTRAVENOUS at 17:12

## 2021-06-17 RX ADMIN — METHOCARBAMOL 500 MG: 500 TABLET, FILM COATED ORAL at 11:23

## 2021-06-17 RX ADMIN — OXYCODONE HYDROCHLORIDE 10 MG: 10 TABLET ORAL at 15:23

## 2021-06-17 RX ADMIN — AMLODIPINE BESYLATE 5 MG: 5 TABLET ORAL at 10:20

## 2021-06-17 RX ADMIN — HEPARIN SODIUM 5000 UNITS: 5000 INJECTION INTRAVENOUS; SUBCUTANEOUS at 05:14

## 2021-06-17 RX ADMIN — GABAPENTIN 300 MG: 300 CAPSULE ORAL at 10:20

## 2021-06-17 RX ADMIN — GABAPENTIN 300 MG: 300 CAPSULE ORAL at 15:23

## 2021-06-17 RX ADMIN — KETOROLAC TROMETHAMINE 15 MG: 30 INJECTION, SOLUTION INTRAMUSCULAR; INTRAVENOUS at 11:23

## 2021-06-17 RX ADMIN — HEPARIN SODIUM 5000 UNITS: 5000 INJECTION INTRAVENOUS; SUBCUTANEOUS at 15:23

## 2021-06-17 NOTE — PROGRESS NOTES
Progress Note - Thoracic Surgery   Stefano Riley III 44 y o  male MRN: [id number]  Unit/Bed#: [facility] 412-01 Encounter: [id number]    Assessment:  45yo M s/p right upper lobectomy (6/7/21) for RUL NSCLC, now with enlarged right-sided pneumothorax s/p IR chest tube placement    R pigtail CT: no output (-20, no air leak)    PA/lateral CXR: no residual PTX visualized    Plan:  Maintain R pigtail chest tube to suction  Consider transition to water seal today  Wean supplemental oxygen off  Incentive spirometry  OOB/ambulate  Pain control    Subjective/Objective     Subjective:   No acute events overnight  Continues to have some pain at chest tube site, but it is greatly improved over past 24 hours  No SOB  Objective:    Blood pressure 121/75, pulse 60, temperature 97 7 °F (36 5 °C), temperature source Oral, resp  rate 16, SpO2 98 %  ,There is no height or weight on file to calculate BMI        Intake/Output Summary (Last 24 hours) at 6/17/2021 0618  Last data filed at 6/16/2021 2300  Gross per 24 hour   Intake 1300 ml   Output 475 ml   Net 825 ml       Invasive Devices       Peripheral Intravenous Line              Peripheral IV 06/15/21 Left Forearm 1 day    Peripheral IV 06/15/21 Right Antecubital 1 day              Drain              Chest Tube 1 Right Second intercostal space 10 Fr  1 day                    Physical Exam:   Gen:  NAD  CV:  warm, well-perfused  Lungs: nl effort on 2L NC   R chest tube to suction, no air leak  Abd:  soft, NT/ND  Ext:  no CCE  Neuro: A&Ox3               Invalid input(s): LABGLOM

## 2021-06-17 NOTE — ED ATTENDING ATTESTATION
6/15/2021  IDano MD, saw and evaluated the patient  I have discussed the patient with the resident/non-physician practitioner and agree with the resident's/non-physician practitioner's findings, Plan of Care, and MDM as documented in the resident's/non-physician practitioner's note, except where noted  All available labs and Radiology studies were reviewed  I was present for key portions of any procedure(s) performed by the resident/non-physician practitioner and I was immediately available to provide assistance  At this point I agree with the current assessment done in the Emergency Department  I have conducted an independent evaluation of this patient a history and physical is as follows:    ED Course     53-year-old male presents with known right-sided pneumothorax worsening of pneumothorax  Patient known to the thoracic surgery service      Vitals reviewed  Case discussed with thoracic surgery will admit the thoracic surgery service for further evaluation management    Critical Care Time  Procedures

## 2021-06-17 NOTE — DISCHARGE INSTRUCTIONS
Please follow-up in 2 weeks with your surgeon after getting a CXR      Activity:  - No lifting greater than 20 pounds or strenuous physical activity or exercise for 2 weeks  - Walking and normal light activities are encouraged  - Normal daily activities including climbing steps are okay  - No driving until no longer using pain medications      Diet:    - You may resume your normal diet      Wound Care:  - May shower daily  No tub baths or swimming until cleared by your surgeon   - Wash incision gently with soap and water and pat dry  - Do not apply any creams or ointments unless instructed to do so by your surgeon   - Aneta Madrid may apply ice as needed (no longer than 20 minutes at a time) for the first 48 hours  - Bruising is not unusual and will go away with a little time  You may apply a warm, moist compress that may help the bruising resolve quicker  - You may remove the dressings the day after surgery (unless otherwise instructed)  Leave any skin tapes (steri-strips) on the incision(s) in place until they fall off on their own  Any new dressings are optional      Medications:    - You may resume all of your regular medications, including blood thinners and aspirin, after going home unless otherwise instructed  Please refer to your discharge medication list for further details  - Please take the pain medications as directed  Additional Instructions:  - If you have any questions or concerns after discharge please call the office   - Call office or return to ER if fever greater than 101, chills, persistent nausea/vomiting, worsening/uncontrollable pain, and/or increasing redness or purulent/foul smelling drainage from incision(s)

## 2021-06-17 NOTE — QUICK NOTE
2pm CXR reviewed and found to be stable compared to previous after clamping chest tube this morning  No air leak identified in atrium with cough and valsalva  Chest tube removed in the usual fashion without incident  Patient tolerated procedure well  Will check portable CXR and if stable can discharge patient home

## 2021-06-17 NOTE — DISCHARGE SUMMARY
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]    HPI: Anuradha Patterson III is a 44y o  year old male with PMHx of RUL NSCLC s/p  Right upper lobectomy (06/07/2021), who presents with  Enlarged right-sided pneumothorax seen on outpatient chest x-ray yesterday  Patient was discharged 06/10/2021, but began to experience worsening right-sided subcutaneous emphysema, chest pain, and dyspnea on exertion that began yesterday  He was told to obtain a PA and lateral chest x-ray, which showed an enlarged pneumothorax compared to the previous chest x-ray just prior to discharge  As such, he was told to present to the [place] emergency department  Procedures Performed: No orders of the defined types were placed in this encounter  Summary of Hospital Course: Patient was admitted on 5/15 for finding of right pneumothorax on CXR after right upper lobectomy on 6/7 for NSCLC  IR consulted for right pigtail placement which was done on 6/15  On hospital day 2, patient was noted to have no air leak so CT was clamped  Repeat PA and lateral CXR showed stability and it was again confirmed that there was no air leak so CT was removed  CXR following removal was stable so patient was deemed appropriate for discharge with follow up in 2 weeks with his surgeon after getting a repeat CXR      Significant Findings, Care, Treatment and Services Provided:   6/15: R pigtail chest tube placement - IR    Complications: Right pneumothorax    Discharge Diagnosis: Right pneumothorax    Medical Problems     Resolved Problems  Date Reviewed: 6/10/2021    None                Condition at Discharge: good         Discharge instructions/Information to patient and family:   See after visit summary for information provided to patient and family  Provisions for Follow-Up Care:  See after visit summary for information related to follow-up care and any pertinent home health orders  PCP: Dellar Fabry, DO    Disposition: Home    Planned Readmission: No      Discharge Statement   I spent 30 minutes discharging the patient  This time was spent on the day of discharge  I had direct contact with the patient on the day of discharge  Additional documentation is required if more than 30 minutes were spent on discharge  Discharge Medications:  See after visit summary for reconciled discharge medications provided to patient and family

## 2021-06-18 ENCOUNTER — TRANSITIONAL CARE MANAGEMENT (OUTPATIENT)
Dept: INTERNAL MEDICINE CLINIC | Facility: CLINIC | Age: 51
End: 2021-06-18

## 2021-06-18 ENCOUNTER — TELEPHONE (OUTPATIENT)
Dept: CARDIAC SURGERY | Facility: CLINIC | Age: 51
End: 2021-06-18

## 2021-06-18 ENCOUNTER — TELEPHONE (OUTPATIENT)
Dept: INTERNAL MEDICINE CLINIC | Facility: CLINIC | Age: 51
End: 2021-06-18

## 2021-06-18 DIAGNOSIS — D38.1 NEOPLASM OF UNCERTAIN BEHAVIOR OF RIGHT UPPER LOBE OF LUNG: Primary | ICD-10-CM

## 2021-06-18 RX ORDER — OXYCODONE HYDROCHLORIDE 5 MG/1
5 TABLET ORAL EVERY 6 HOURS PRN
Qty: 15 TABLET | Refills: 0 | Status: SHIPPED | OUTPATIENT
Start: 2021-06-18 | End: 2021-08-10 | Stop reason: ALTCHOICE

## 2021-06-18 NOTE — TELEPHONE ENCOUNTER
Radiology called with an immediate finding on chest xray from when pt was in hospital  They wanted to verify you know and also pt has an appt Tuesday with us for DORIS

## 2021-06-18 NOTE — UTILIZATION REVIEW
Notification of Discharge   This is a Notification of Discharge from our facility [address]  Please be advised that this patient has been discharge from our facility  Below you will find the admission and discharge date and time including the patients disposition  UTILIZATION REVIEW CONTACT:  Martha Le  Utilization   Network Utilization Review Department  Phone: [phone] x carefully listen to the prompts  All voicemails are confidential   Email: [email or web address]     PHYSICIAN ADVISORY SERVICES:  FOR CIDZ-CB-QUYQ REVIEW - MEDICAL NECESSITY DENIAL  Phone: [phone]  Fax: [phone]  Email: [email or web address]     PRESENTATION DATE: 6/15/2021  4:43 PM  OBERVATION ADMISSION DATE:   INPATIENT ADMISSION DATE: 6/15/21  6:24 PM   DISCHARGE DATE: 6/17/2021  8:30 PM  DISPOSITION: Home/Self Care Home/Self Care      IMPORTANT INFORMATION:  Send all requests for admission clinical reviews, approved or denied determinations and any other requests to dedicated fax number below belonging to the campus where the patient is receiving treatment   List of dedicated fax numbers:  [address] DENIALS (Administrative/Medical Necessity) [phone]   [address] [facility] (Maternity/NICU/Pediatrics) [phone]   Siena Contreras [phone]   Fransisca Sepulveda [phone]   Wyatt Beatty [phone]   [address] [phone]   [facility]  [phone]   [address] [facility] [address] [facility]  [address] [facility] [address] [facility] [phone]

## 2021-06-18 NOTE — TELEPHONE ENCOUNTER
I spoke with Radiology(Claire) and she will call surgery to inform them as they had ordered xray prior to pt discharge

## 2021-06-22 ENCOUNTER — APPOINTMENT (OUTPATIENT)
Dept: RADIOLOGY | Facility: MEDICAL CENTER | Age: 51
End: 2021-06-22
Payer: COMMERCIAL

## 2021-06-22 ENCOUNTER — TELEMEDICINE (OUTPATIENT)
Dept: INTERNAL MEDICINE CLINIC | Facility: CLINIC | Age: 51
End: 2021-06-22
Payer: COMMERCIAL

## 2021-06-22 DIAGNOSIS — J93.9 PNEUMOTHORAX ON RIGHT: ICD-10-CM

## 2021-06-22 DIAGNOSIS — D38.1 NEOPLASM OF UNCERTAIN BEHAVIOR OF RIGHT UPPER LOBE OF LUNG: Primary | ICD-10-CM

## 2021-06-22 DIAGNOSIS — J43.2 CENTRILOBULAR EMPHYSEMA (HCC): ICD-10-CM

## 2021-06-22 PROBLEM — F17.210 CIGARETTE NICOTINE DEPENDENCE WITHOUT COMPLICATION: Status: RESOLVED | Noted: 2019-04-19 | Resolved: 2021-06-22

## 2021-06-22 PROBLEM — Z72.0 TOBACCO USE: Status: RESOLVED | Noted: 2020-05-18 | Resolved: 2021-06-22

## 2021-06-22 PROBLEM — J39.2 OROPHARYNGEAL MASS: Status: RESOLVED | Noted: 2019-04-19 | Resolved: 2021-06-22

## 2021-06-22 PROCEDURE — 1111F DSCHRG MED/CURRENT MED MERGE: CPT | Performed by: INTERNAL MEDICINE

## 2021-06-22 PROCEDURE — 99495 TRANSJ CARE MGMT MOD F2F 14D: CPT | Performed by: INTERNAL MEDICINE

## 2021-06-22 PROCEDURE — 71046 X-RAY EXAM CHEST 2 VIEWS: CPT

## 2021-06-23 ENCOUNTER — OFFICE VISIT (OUTPATIENT)
Dept: CARDIAC SURGERY | Facility: CLINIC | Age: 51
End: 2021-06-23

## 2021-06-23 VITALS
WEIGHT: 157.19 LBS | SYSTOLIC BLOOD PRESSURE: 119 MMHG | RESPIRATION RATE: 16 BRPM | HEIGHT: 73 IN | OXYGEN SATURATION: 99 % | DIASTOLIC BLOOD PRESSURE: 79 MMHG | HEART RATE: 71 BPM | BODY MASS INDEX: 20.83 KG/M2 | TEMPERATURE: 98.2 F

## 2021-06-23 DIAGNOSIS — C34.11 PRIMARY ADENOCARCINOMA OF UPPER LOBE OF RIGHT LUNG (HCC): Primary | ICD-10-CM

## 2021-06-23 PROCEDURE — 3008F BODY MASS INDEX DOCD: CPT | Performed by: PHYSICIAN ASSISTANT

## 2021-06-23 PROCEDURE — 99024 POSTOP FOLLOW-UP VISIT: CPT | Performed by: THORACIC SURGERY (CARDIOTHORACIC VASCULAR SURGERY)

## 2021-06-23 NOTE — LETTER
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  He does not need any adjuvant treatment  We will start surveillance imaging with our 1st noncontrast CT in December  I will order that now and see him back after that study  Please contact me with any questions  Sincerely,        Hunter Akers MD        CC: No Recipients  Hunter Akers MD  6/23/2021  2:59 PM  Sign when Signing Visit  Thoracic Follow-Up  Assessment/Plan:    39-year-old male with a right upper lobe lung mass status post 06/07/2021 robotic right upper lobectomy for what proved to be a stage I A 2 adenocarcinoma     Prakash Bowden is doing well now 16 days out from surgery  He had to be readmitted for a few nights due to a delayed pneumothorax  This is completely resolved  He does still have some minor breathing changes as expected after lobectomy  This should continue to improve with time  Fortunately still refrain from smoking  I have advised him to  Continue to do so  He has some mild pain at his incisions  He can go back on Tylenol or  NSAIDs as needed  He has no need for narcotics  His incisions look well healed at this time  I would like him to slowly increase his physical activity     we discussed that this cancer was a stage I lung cancer and thus requires no further treatment   Long-term we will see him back  In 6 months with a surveillance CT scan of the chest   I will order this now and then we will see him back in December  I have asked him to call us sooner with any issues and we would be happy to see him back in our office before then  Jan Beavers MD      Diagnoses and all orders for this visit:    Primary adenocarcinoma of upper lobe of right lung [facility])  -     CT chest wo contrast; Future          Thoracic History   Problem: right upper lobe adenocarcinoma    Procedure: 6/7/21  Robotic right upper lobectomy  6/15/21  Readmitted  For delayed pneumothorax    Pathology: Stage IA2 RUL adenocarcinoma pT1b 2 5 cm RUL adenocarcinoma, N0, M0       Subjective:    Patient ID: Dodie García III is a 44 y o  male  HPI   Dodie Thomas comes back to our office following his 06/07/2021 robotic right upper lobectomy in 06/15/2021 readmission for delayed pneumothorax  He is doing much better since his short stay for his delayed pneumothorax  He notes no major complaints at this time  He notes some mild increased shortness of breath over the last day or so  He has some minor pain and fullness at his incisions but is no longer taking anything for pain  He is not on any narcotics and he is still refrain from smoking cigarettes  The following portions of the patient's history were reviewed and updated as appropriate: allergies, current medications, past family history, past medical history, past social history, past surgical history and problem list     Review of Systems   Constitutional: Negative for activity change, appetite change, chills, diaphoresis, fatigue, fever and unexpected weight change  HENT: Negative  Eyes: Negative  Respiratory: Positive for shortness of breath  Negative for apnea, cough, chest tightness, wheezing and stridor  Cardiovascular: Negative for chest pain, palpitations and leg swelling  Gastrointestinal: Negative for abdominal distention, abdominal pain, blood in stool, constipation, diarrhea, nausea and vomiting  Endocrine: Negative  Genitourinary: Negative      Musculoskeletal: Positive for myalgias  Skin: Negative  Allergic/Immunologic: Negative  Neurological: Negative  Hematological: Negative  Psychiatric/Behavioral: Negative  Objective:   Physical Exam  Vitals and nursing note reviewed  Constitutional:       General: He is not in acute distress  Appearance: Normal appearance  He is well-developed  He is not diaphoretic  Comments: Appears very well in no acute distress  HENT:      Head: Normocephalic and atraumatic  Mouth/Throat:      Pharynx: No oropharyngeal exudate  Comments: Wearing a mask  Eyes:      General: No scleral icterus  Conjunctiva/sclera: Conjunctivae normal       Pupils: Pupils are equal, round, and reactive to light  Neck:      Thyroid: No thyromegaly  Vascular: No JVD  Trachea: No tracheal deviation  Cardiovascular:      Rate and Rhythm: Normal rate and regular rhythm  Heart sounds: Normal heart sounds  No murmur heard  No friction rub  No gallop  Pulmonary:      Effort: Pulmonary effort is normal  No respiratory distress  Breath sounds: Normal breath sounds  No wheezing or rales  Comments: Normal work of breathing on room air  Lungs are clear to auscultation bilaterally    Right chest incisions x5 are well healed without erythema  I personally removed the suture today in the office  Chest:      Chest wall: No tenderness  Abdominal:      General: Bowel sounds are normal  There is no distension  Palpations: Abdomen is soft  There is no mass  Tenderness: There is no abdominal tenderness  There is no guarding or rebound  Musculoskeletal:         General: No tenderness or deformity  Normal range of motion  Cervical back: Normal range of motion and neck supple  Skin:     General: Skin is warm and dry  Coloration: Skin is not pale  Findings: No erythema or rash  Neurological:      General: No focal deficit present        Mental Status: He is alert and oriented to person, place, and time  Psychiatric:         Mood and Affect: Mood normal          Behavior: Behavior normal          Thought Content: Thought content normal          Judgment: Judgment normal      /79   Pulse 71   Temp 98 2 °F (36 8 °C) (Temporal)   Resp 16   Ht 6' 1" (1 854 m)   Wt 71 3 kg (157 lb 3 oz)   SpO2 99%   BMI 20 74 kg/m²     XR chest pa & lateral    Result Date: 6/18/2021  Impression Recurrent trace right apical pneumothorax  Workstation performed: [id number]     XR chest pa & lateral    Result Date: 6/16/2021  Impression Status post right chest tube placement with no residual pneumothorax visualized  Workstation performed: [id number]     XR chest pa & lateral    Result Date: 6/15/2021  Impression Right apical pneumothorax appearing larger compared to the prior study  The study was marked in [facility] for immediate notification  Workstation performed: [id number]     XR chest pa & lateral    Result Date: 6/9/2021  Impression Small right pneumothorax after chest tube removal  Workstation performed: [id number]      CT chest wo contrast    Result Date: 5/4/2021  Narrative CT CHEST WITHOUT IV CONTRAST INDICATION:   D38 1: Neoplasm of uncertain behavior of trachea, bronchus and lung  COMPARISON:  CT scan 2/11/2021 TECHNIQUE: CT examination of the chest was performed without intravenous contrast   Axial, sagittal, and coronal 2D reformatted images were created from the source data and submitted for interpretation  Radiation dose length product (DLP) for this visit:  358 16 mGy-cm   This examination, like all CT scans performed in the [facility], was performed utilizing techniques to minimize radiation dose exposure, including the use of iterative  reconstruction and automated exposure control  FINDINGS: LUNGS:  Stable background emphysema with right upper lobe nodule measuring 1 7 x 1 3 cm image 4/20    Stable left upper lobe pleural-based nodule posteriorly measuring 7 mm on image 4/13  Stable mucoid impacted bronchus in the right middle lobe image 4/44 nothing new or suspicious  No endotracheal or endobronchial lesion  PLEURA:  Unremarkable  HEART/GREAT VESSELS:  Unremarkable for patient's age  MEDIASTINUM AND SHOA:  Unremarkable  CHEST WALL AND LOWER NECK:   Unremarkable  VISUALIZED STRUCTURES IN THE UPPER ABDOMEN:  Status post gastric bypass surgery  Status post cholecystectomy  Upper abdomen otherwise unremarkable  OSSEOUS STRUCTURES:  No acute fracture or destructive osseous lesion  Impression Stable background emphysema with right upper lobe ill-defined nodule measuring 1 7 x 1 3 cm image 4/20  Stable left upper lobe pleural-based nodule posteriorly measuring 7 mm on image 4/13  Continued follow-up recommended in one year  Workstation performed: [id number]     CT chest without contrast    Result Date: 2/17/2021  Narrative CT CHEST WITHOUT IV CONTRAST INDICATION:   R91 1: Solitary pulmonary nodule  Slight shortness of breath  History of pulmonary nodule  Follow-up evaluation  History of Covid 19 in January 2020  COMPARISON:  8/10/2020; 5/15/2020; 1/14/2020; 6/3/2020; 3/21/2019 TECHNIQUE: CT examination of the chest was performed without intravenous contrast   Axial, sagittal, and coronal 2D reformatted images were created from the source data and submitted for interpretation  Radiation dose length product (DLP) for this visit:  99 3 mGy-cm   This examination, like all CT scans performed in the [facility], was performed utilizing techniques to minimize radiation dose exposure, including the use of iterative reconstruction and automated exposure control  The study is submitted for interpretation at this time   FINDINGS: LUNGS:  The dominant parenchymal lesion in the right upper lobe which has a amorphous ill-defined groundglass appearance with associated air bronchograms right upper lobe on image 38, series 3 is similar to the prior study measuring 1 7 x 1 2 cm  Compared to the 2019 study this amorphous lesion has slowly increased in size  New linear densities in the lingula image 94, series 3 may represent scarring or subsegmental atelectasis  Otherwise a few small scattered pulmonary nodules elsewhere are stable  There is a 0 7 x 0 6 cm pleural-based nodule in the left upper lobe image 22, series 3, stable from 2019  Small subpleural nodule anteriorly on image 77, series 3 in the right middle lobe is also retrospectively stable from 2019   2 small perifissural nodules associated with the major fissure on the left, images 62 and 63 of series 3 are stable, possibly intrapulmonary lymph nodes  A subtle 1 to 2 mm subpleural left lower lobe nodule image 102, series 3 is also retrospectively stable  Scattered emphysematous changes noted  PLEURA:  Unremarkable  HEART/GREAT VESSELS:  Mild atherosclerotic vascular calcifications noted  MEDIASTINUM AND SOHA:  Unremarkable  CHEST WALL AND LOWER NECK:   Unremarkable  VISUALIZED STRUCTURES IN THE UPPER ABDOMEN:  Postsurgical changes about the stomach and agree related to prior gastric bypass surgery  There are cholecystectomy clips as well  OSSEOUS STRUCTURES:  Mild spondylotic changes noted  Impression 1  Compared to the prior 2019 study, the groundglass lesion in the right upper lobe has gradually enlarged  A slow-growing malignancy should be excluded  Consider repeat PET/CT scan for further assessment given the clear interval enlargement from the prior PET CT scan which demonstrated no FDG avidity previously  2   Emphysema  3   Scattered small pulmonary nodules elsewhere are stable  These can be evaluated on follow-up imaging in one year's time  4   New linear densities in the lingula thought to most likely represent subsegmental atelectasis or scarring can be evaluated on follow-up imaging as well   Workstation performed: [id number]     CT chest high resolution    Result Date: 8/11/2020  Narrative CT CHEST WITHOUT IV CONTRAST INDICATION:   R91 1: Solitary pulmonary nodule  COMPARISON:  Chest CTs dated 5/15/2020, 1/14/2020, 6/3/2019, and 3/8/2019  PET/CT dated 3/21/2019  TECHNIQUE: CT examination of the chest was performed without intravenous contrast   Axial, sagittal, and coronal 2D reformatted images were created from the source data and submitted for interpretation  Radiation dose length product (DLP) for this visit:  372 22 mGy-cm   This examination, like all CT scans performed in the [facility], was performed utilizing techniques to minimize radiation dose exposure, including the use of iterative  reconstruction and automated exposure control  1 25 mm thick reconstructions were generated per navigational protocol  Note: Image numbers reported for findings (if any) are taken from thin axial series 2 unless otherwise indicated  FINDINGS: LUNGS:  Background mild centrilobular emphysema  Angular semisolid nodular opacity in the right upper lobe measuring 15 mm x 10 mm  Air bronchograms travel through the lesion without blunting, obstruction, or distortion  Polygonal pleural-based nodule at the left apex measuring 7 mm on image 70--unchanged  4 mm x 2 mm nodule in the lingula on image 202 is new  3 mm x 2 mm lingular nodule on image 240 appears new  4 mm right upper lobe nodule seen in May (prior series 3, image 54) has resolved  Perifissural on nodules in the left major fissure on images 170 and 174 are unchanged  Scattered 2 to 3 mm nodules in the left lower lobe--difficult to assess stability given difference in technique from priors, but potentially new  New 5 mm cavitary nodule in the right lower lobe (series 2, image 199)  No endobronchial lesions  PLEURA:  Unremarkable  HEART/GREAT VESSELS:  Heart size is normal   Aortic valvular and coronary calcifications  No pericardial thickening or effusion  Thoracic aorta appears normal for age  MEDIASTINUM AND SOHA:  Unremarkable   CHEST WALL AND LOWER NECK:   Trace gynecomastia  VISUALIZED STRUCTURES IN THE UPPER ABDOMEN:  Prior cholecystectomy  Prior gastric bypass  No acute findings in the upper abdomen  OSSEOUS STRUCTURES:  No acute fracture or destructive osseous lesion  Impression Scattered pulmonary nodules with the largest in the right upper lobe measuring 15 mm x 10 mm  Greater measured size from prior probably relates more to reconstruction technique with improved margin visibility than a true change in size  Some of the previously seen nodules have resolved and several new nodules have arisen, including 5 mm cavitary nodule in the right lower lobe  Waxing and waning time course probably favors indolent infectious process, recurrent aspiration, or systemic inflammatory process (e g , sarcoidosis, granulomatous polyangiitis, rheumatoid arthritis) over metastatic disease  Workstation performed: [id number]     No CT Chest,Abdomen,Pelvis results available for this patient  NM PET CT skull base to mid thigh    Result Date: 6/4/2021  Narrative PET/CT SCAN INDICATION:  D38 1: Neoplasm of uncertain behavior of trachea, bronchus and lung   , initial staging for treatment management MODIFIER: PI COMPARISON: CT chest 4/21/2021 and priors including PET CT 3/21/2019 CELL TYPE:  Non-small cell carcinoma, right upper biopsy 5/11/2021 TECHNIQUE:   8 0 mCi F-18-FDG administered IV  Multiplanar attenuation corrected and non attenuation corrected PET images were acquired 60 minutes post injection  Contiguous, low dose, axial CT sections were obtained from the skull base through the femurs   Intravenous contrast material was not utilized  This examination, like all CT scans performed in the [facility], was performed utilizing techniques to minimize radiation dose exposure, including the use of iterative reconstruction and automated exposure control   Fasting serum glucose: 80 mg/dl FINDINGS: VISUALIZED BRAIN:   No acute abnormalities are seen  HEAD/NECK:   There is a physiologic distribution of FDG  No FDG avid cervical adenopathy is seen  CT images: Unremarkable  CHEST:   1 9 x 1 9 cm right upper lung lesion image 3/104 demonstrates mild FDG activity, SUV 1  This has increased in size and FDG uptake since the prior PET/CT, prior measurement 9 mm, SUV 0 6  This is compatible with known low-grade malignancy  Additional small lung nodules described on prior CTs are too small for accurate PET evaluation  No additional hypermetabolic lesions  No hypermetabolic hilar or mediastinal adenopathy  CT images: Emphysema  Coronary atherosclerosis  ABDOMEN:   No FDG avid soft tissue lesions are seen  CT images: Cholecystomy  Gastric bypass  Scattered colon diverticula  PELVIS: No FDG avid soft tissue lesions are seen  CT images: Apparent bladder wall thickening may be exacerbated by under distention  OSSEOUS STRUCTURES: There is increased FDG activity in the right costovertebral junction with associated sclerosis, SUV 2 8, nonspecific but likely degenerative/inflammatory  No additional FDG avid lesions are seen  CT images: Spine degenerative change  Impression 1  1 9 x 1 9 cm right upper irregular lung lesion demonstrates mild FDG activity, compatible with known malignancy  2   No additional hypermetabolic soft tissue lesions in the thorax  No hypermetabolic adenopathy  3   There is increased FDG activity in the right costovertebral junction with associated sclerosis, nonspecific but likely degenerative/inflammatory  4   No hypermetabolic metastases in the neck, abdomen, pelvis  Workstation performed: [id number]      I personally reviewed his chest x-ray from 06/22/2021 and compared this to preoperative imaging  He has no evidence of pneumothorax  Normal expected postoperative changes on that right side  No significant effusion  We discussed his pathology again at length    He understands this right upper lobe mass was consistent with a 2 5 cm adenocarcinoma  All margins were negative  A total of 32 lymph nodes were removed and all were negative    This makes this a stage I A 2 right upper lobe adenocarcinoma  Karson Ennis MD  Thoracic Surgeon    (Available by [facility] Text)

## 2021-06-23 NOTE — PROGRESS NOTES
Thoracic Follow-Up  Assessment/Plan:    57-year-old male with a right upper lobe lung mass status post 06/07/2021 robotic right upper lobectomy for what proved to be a stage I A 2 adenocarcinoma     Anthony Nevarez is doing well now 16 days out from surgery  He had to be readmitted for a few nights due to a delayed pneumothorax  This is completely resolved  He does still have some minor breathing changes as expected after lobectomy  This should continue to improve with time  Fortunately still refrain from smoking  I have advised him to  Continue to do so  He has some mild pain at his incisions  He can go back on Tylenol or  NSAIDs as needed  He has no need for narcotics  His incisions look well healed at this time  I would like him to slowly increase his physical activity     we discussed that this cancer was a stage I lung cancer and thus requires no further treatment  Long-term we will see him back  In 6 months with a surveillance CT scan of the chest   I will order this now and then we will see him back in December  I have asked him to call us sooner with any issues and we would be happy to see him back in our office before then  Usman Bernard MD      Diagnoses and all orders for this visit:    Primary adenocarcinoma of upper lobe of right lung [facility])  -     CT chest wo contrast; Future          Thoracic History   Problem: right upper lobe adenocarcinoma    Procedure: 6/7/21  Robotic right upper lobectomy  6/15/21  Readmitted  For delayed pneumothorax    Pathology: Stage IA2 RUL adenocarcinoma pT1b 2 5 cm RUL adenocarcinoma, N0, M0       Subjective:    Patient ID: Angella Eaton III is a 44 y o  male  HPI   Anthony Nevarez comes back to our office following his 06/07/2021 robotic right upper lobectomy in 06/15/2021 readmission for delayed pneumothorax  He is doing much better since his short stay for his delayed pneumothorax  He notes no major complaints at this time    He notes some mild increased shortness of breath over the last day or so  He has some minor pain and fullness at his incisions but is no longer taking anything for pain  He is not on any narcotics and he is still refrain from smoking cigarettes  The following portions of the patient's history were reviewed and updated as appropriate: allergies, current medications, past family history, past medical history, past social history, past surgical history and problem list     Review of Systems   Constitutional: Negative for activity change, appetite change, chills, diaphoresis, fatigue, fever and unexpected weight change  HENT: Negative  Eyes: Negative  Respiratory: Positive for shortness of breath  Negative for apnea, cough, chest tightness, wheezing and stridor  Cardiovascular: Negative for chest pain, palpitations and leg swelling  Gastrointestinal: Negative for abdominal distention, abdominal pain, blood in stool, constipation, diarrhea, nausea and vomiting  Endocrine: Negative  Genitourinary: Negative  Musculoskeletal: Positive for myalgias  Skin: Negative  Allergic/Immunologic: Negative  Neurological: Negative  Hematological: Negative  Psychiatric/Behavioral: Negative  Objective:   Physical Exam  Vitals and nursing note reviewed  Constitutional:       General: He is not in acute distress  Appearance: Normal appearance  He is well-developed  He is not diaphoretic  Comments: Appears very well in no acute distress  HENT:      Head: Normocephalic and atraumatic  Mouth/Throat:      Pharynx: No oropharyngeal exudate  Comments: Wearing a mask  Eyes:      General: No scleral icterus  Conjunctiva/sclera: Conjunctivae normal       Pupils: Pupils are equal, round, and reactive to light  Neck:      Thyroid: No thyromegaly  Vascular: No JVD  Trachea: No tracheal deviation  Cardiovascular:      Rate and Rhythm: Normal rate and regular rhythm        Heart sounds: Normal heart sounds  No murmur heard  No friction rub  No gallop  Pulmonary:      Effort: Pulmonary effort is normal  No respiratory distress  Breath sounds: Normal breath sounds  No wheezing or rales  Comments: Normal work of breathing on room air  Lungs are clear to auscultation bilaterally    Right chest incisions x5 are well healed without erythema  I personally removed the suture today in the office  Chest:      Chest wall: No tenderness  Abdominal:      General: Bowel sounds are normal  There is no distension  Palpations: Abdomen is soft  There is no mass  Tenderness: There is no abdominal tenderness  There is no guarding or rebound  Musculoskeletal:         General: No tenderness or deformity  Normal range of motion  Cervical back: Normal range of motion and neck supple  Skin:     General: Skin is warm and dry  Coloration: Skin is not pale  Findings: No erythema or rash  Neurological:      General: No focal deficit present  Mental Status: He is alert and oriented to person, place, and time  Psychiatric:         Mood and Affect: Mood normal          Behavior: Behavior normal          Thought Content: Thought content normal          Judgment: Judgment normal      /79   Pulse 71   Temp 98 2 °F (36 8 °C) (Temporal)   Resp 16   Ht 6' 1" (1 854 m)   Wt 71 3 kg (157 lb 3 oz)   SpO2 99%   BMI 20 74 kg/m²     XR chest pa & lateral    Result Date: 6/18/2021  Impression Recurrent trace right apical pneumothorax  Workstation performed: [id number]     XR chest pa & lateral    Result Date: 6/16/2021  Impression Status post right chest tube placement with no residual pneumothorax visualized  Workstation performed: [id number]     XR chest pa & lateral    Result Date: 6/15/2021  Impression Right apical pneumothorax appearing larger compared to the prior study  The study was marked in [facility] for immediate notification   Workstation performed: [id number]     XR chest pa & lateral    Result Date: 6/9/2021  Impression Small right pneumothorax after chest tube removal  Workstation performed: [id number]      CT chest wo contrast    Result Date: 5/4/2021  Narrative CT CHEST WITHOUT IV CONTRAST INDICATION:   D38 1: Neoplasm of uncertain behavior of trachea, bronchus and lung  COMPARISON:  CT scan 2/11/2021 TECHNIQUE: CT examination of the chest was performed without intravenous contrast   Axial, sagittal, and coronal 2D reformatted images were created from the source data and submitted for interpretation  Radiation dose length product (DLP) for this visit:  358 16 mGy-cm   This examination, like all CT scans performed in the [facility], was performed utilizing techniques to minimize radiation dose exposure, including the use of iterative  reconstruction and automated exposure control  FINDINGS: LUNGS:  Stable background emphysema with right upper lobe nodule measuring 1 7 x 1 3 cm image 4/20  Stable left upper lobe pleural-based nodule posteriorly measuring 7 mm on image 4/13  Stable mucoid impacted bronchus in the right middle lobe image 4/44 nothing new or suspicious  No endotracheal or endobronchial lesion  PLEURA:  Unremarkable  HEART/GREAT VESSELS:  Unremarkable for patient's age  MEDIASTINUM AND SOHA:  Unremarkable  CHEST WALL AND LOWER NECK:   Unremarkable  VISUALIZED STRUCTURES IN THE UPPER ABDOMEN:  Status post gastric bypass surgery  Status post cholecystectomy  Upper abdomen otherwise unremarkable  OSSEOUS STRUCTURES:  No acute fracture or destructive osseous lesion  Impression Stable background emphysema with right upper lobe ill-defined nodule measuring 1 7 x 1 3 cm image 4/20  Stable left upper lobe pleural-based nodule posteriorly measuring 7 mm on image 4/13  Continued follow-up recommended in one year   Workstation performed: [id number]     CT chest without contrast    Result Date: 2/17/2021  Narrative CT CHEST WITHOUT IV CONTRAST INDICATION: R91 1: Solitary pulmonary nodule  Slight shortness of breath  History of pulmonary nodule  Follow-up evaluation  History of Covid 19 in January 2020  COMPARISON:  8/10/2020; 5/15/2020; 1/14/2020; 6/3/2020; 3/21/2019 TECHNIQUE: CT examination of the chest was performed without intravenous contrast   Axial, sagittal, and coronal 2D reformatted images were created from the source data and submitted for interpretation  Radiation dose length product (DLP) for this visit:  99 3 mGy-cm   This examination, like all CT scans performed in the [facility], was performed utilizing techniques to minimize radiation dose exposure, including the use of iterative reconstruction and automated exposure control  The study is submitted for interpretation at this time  FINDINGS: LUNGS:  The dominant parenchymal lesion in the right upper lobe which has a amorphous ill-defined groundglass appearance with associated air bronchograms right upper lobe on image 38, series 3 is similar to the prior study measuring 1 7 x 1 2 cm  Compared to the 2019 study this amorphous lesion has slowly increased in size  New linear densities in the lingula image 94, series 3 may represent scarring or subsegmental atelectasis  Otherwise a few small scattered pulmonary nodules elsewhere are stable  There is a 0 7 x 0 6 cm pleural-based nodule in the left upper lobe image 22, series 3, stable from 2019  Small subpleural nodule anteriorly on image 77, series 3 in the right middle lobe is also retrospectively stable from 2019   2 small perifissural nodules associated with the major fissure on the left, images 62 and 63 of series 3 are stable, possibly intrapulmonary lymph nodes  A subtle 1 to 2 mm subpleural left lower lobe nodule image 102, series 3 is also retrospectively stable  Scattered emphysematous changes noted  PLEURA:  Unremarkable  HEART/GREAT VESSELS:  Mild atherosclerotic vascular calcifications noted   MEDIASTINUM AND SOHA: Unremarkable  CHEST WALL AND LOWER NECK:   Unremarkable  VISUALIZED STRUCTURES IN THE UPPER ABDOMEN:  Postsurgical changes about the stomach and agree related to prior gastric bypass surgery  There are cholecystectomy clips as well  OSSEOUS STRUCTURES:  Mild spondylotic changes noted  Impression 1  Compared to the prior 2019 study, the groundglass lesion in the right upper lobe has gradually enlarged  A slow-growing malignancy should be excluded  Consider repeat PET/CT scan for further assessment given the clear interval enlargement from the prior PET CT scan which demonstrated no FDG avidity previously  2   Emphysema  3   Scattered small pulmonary nodules elsewhere are stable  These can be evaluated on follow-up imaging in one year's time  4   New linear densities in the lingula thought to most likely represent subsegmental atelectasis or scarring can be evaluated on follow-up imaging as well  Workstation performed: [id number]     CT chest high resolution    Result Date: 8/11/2020  Narrative CT CHEST WITHOUT IV CONTRAST INDICATION:   R91 1: Solitary pulmonary nodule  COMPARISON:  Chest CTs dated 5/15/2020, 1/14/2020, 6/3/2019, and 3/8/2019  PET/CT dated 3/21/2019  TECHNIQUE: CT examination of the chest was performed without intravenous contrast   Axial, sagittal, and coronal 2D reformatted images were created from the source data and submitted for interpretation  Radiation dose length product (DLP) for this visit:  372 22 mGy-cm   This examination, like all CT scans performed in the [facility], was performed utilizing techniques to minimize radiation dose exposure, including the use of iterative  reconstruction and automated exposure control  1 25 mm thick reconstructions were generated per navigational protocol  Note: Image numbers reported for findings (if any) are taken from thin axial series 2 unless otherwise indicated  FINDINGS: LUNGS:  Background mild centrilobular emphysema  Angular semisolid nodular opacity in the right upper lobe measuring 15 mm x 10 mm  Air bronchograms travel through the lesion without blunting, obstruction, or distortion  Polygonal pleural-based nodule at the left apex measuring 7 mm on image 70--unchanged  4 mm x 2 mm nodule in the lingula on image 202 is new  3 mm x 2 mm lingular nodule on image 240 appears new  4 mm right upper lobe nodule seen in May (prior series 3, image 54) has resolved  Perifissural on nodules in the left major fissure on images 170 and 174 are unchanged  Scattered 2 to 3 mm nodules in the left lower lobe--difficult to assess stability given difference in technique from priors, but potentially new  New 5 mm cavitary nodule in the right lower lobe (series 2, image 199)  No endobronchial lesions  PLEURA:  Unremarkable  HEART/GREAT VESSELS:  Heart size is normal   Aortic valvular and coronary calcifications  No pericardial thickening or effusion  Thoracic aorta appears normal for age  MEDIASTINUM AND SOHA:  Unremarkable  CHEST WALL AND LOWER NECK:   Trace gynecomastia  VISUALIZED STRUCTURES IN THE UPPER ABDOMEN:  Prior cholecystectomy  Prior gastric bypass  No acute findings in the upper abdomen  OSSEOUS STRUCTURES:  No acute fracture or destructive osseous lesion  Impression Scattered pulmonary nodules with the largest in the right upper lobe measuring 15 mm x 10 mm  Greater measured size from prior probably relates more to reconstruction technique with improved margin visibility than a true change in size  Some of the previously seen nodules have resolved and several new nodules have arisen, including 5 mm cavitary nodule in the right lower lobe  Waxing and waning time course probably favors indolent infectious process, recurrent aspiration, or systemic inflammatory process (e g , sarcoidosis, granulomatous polyangiitis, rheumatoid arthritis) over metastatic disease   Workstation performed: [id number]     No CT Luis Fernando Workman results available for this patient  NM PET CT skull base to mid thigh    Result Date: 6/4/2021  Narrative PET/CT SCAN INDICATION:  D38 1: Neoplasm of uncertain behavior of trachea, bronchus and lung   , initial staging for treatment management MODIFIER: PI COMPARISON: CT chest 4/21/2021 and priors including PET CT 3/21/2019 CELL TYPE:  Non-small cell carcinoma, right upper biopsy 5/11/2021 TECHNIQUE:   8 0 mCi F-18-FDG administered IV  Multiplanar attenuation corrected and non attenuation corrected PET images were acquired 60 minutes post injection  Contiguous, low dose, axial CT sections were obtained from the skull base through the femurs   Intravenous contrast material was not utilized  This examination, like all CT scans performed in the [facility], was performed utilizing techniques to minimize radiation dose exposure, including the use of iterative reconstruction and automated exposure control  Fasting serum glucose: 80 mg/dl FINDINGS: VISUALIZED BRAIN:   No acute abnormalities are seen  HEAD/NECK:   There is a physiologic distribution of FDG  No FDG avid cervical adenopathy is seen  CT images: Unremarkable  CHEST:   1 9 x 1 9 cm right upper lung lesion image 3/104 demonstrates mild FDG activity, SUV 1  This has increased in size and FDG uptake since the prior PET/CT, prior measurement 9 mm, SUV 0 6  This is compatible with known low-grade malignancy  Additional small lung nodules described on prior CTs are too small for accurate PET evaluation  No additional hypermetabolic lesions  No hypermetabolic hilar or mediastinal adenopathy  CT images: Emphysema  Coronary atherosclerosis  ABDOMEN:   No FDG avid soft tissue lesions are seen  CT images: Cholecystomy  Gastric bypass  Scattered colon diverticula  PELVIS: No FDG avid soft tissue lesions are seen  CT images: Apparent bladder wall thickening may be exacerbated by under distention   OSSEOUS STRUCTURES: There is increased FDG activity in the right costovertebral junction with associated sclerosis, SUV 2 8, nonspecific but likely degenerative/inflammatory  No additional FDG avid lesions are seen  CT images: Spine degenerative change  Impression 1  1 9 x 1 9 cm right upper irregular lung lesion demonstrates mild FDG activity, compatible with known malignancy  2   No additional hypermetabolic soft tissue lesions in the thorax  No hypermetabolic adenopathy  3   There is increased FDG activity in the right costovertebral junction with associated sclerosis, nonspecific but likely degenerative/inflammatory  4   No hypermetabolic metastases in the neck, abdomen, pelvis  Workstation performed: [id number]      I personally reviewed his chest x-ray from 06/22/2021 and compared this to preoperative imaging  He has no evidence of pneumothorax  Normal expected postoperative changes on that right side  No significant effusion  We discussed his pathology again at length  He understands this right upper lobe mass was consistent with a 2 5 cm adenocarcinoma  All margins were negative  A total of 32 lymph nodes were removed and all were negative    This makes this a stage I A 2 right upper lobe adenocarcinoma  Ryan Espinosa MD  Thoracic Surgeon    (Available by [facility] Text)

## 2021-06-24 ENCOUNTER — TELEPHONE (OUTPATIENT)
Dept: CARDIAC SURGERY | Facility: CLINIC | Age: 51
End: 2021-06-24

## 2021-06-24 ENCOUNTER — HOSPITAL ENCOUNTER (OUTPATIENT)
Dept: RADIOLOGY | Facility: HOSPITAL | Age: 51
Discharge: HOME | End: 2021-06-24
Payer: COMMERCIAL

## 2021-06-24 ENCOUNTER — DOCUMENTATION (OUTPATIENT)
Dept: CARDIAC SURGERY | Facility: CLINIC | Age: 51
End: 2021-06-24

## 2021-06-24 ENCOUNTER — TELEPHONE (OUTPATIENT)
Dept: HEMATOLOGY ONCOLOGY | Facility: CLINIC | Age: 51
End: 2021-06-24

## 2021-06-24 DIAGNOSIS — J93.83 OTHER PNEUMOTHORAX: Primary | ICD-10-CM

## 2021-06-24 DIAGNOSIS — J93.83 OTHER PNEUMOTHORAX: ICD-10-CM

## 2021-06-24 PROCEDURE — 71046 X-RAY EXAM CHEST 2 VIEWS: CPT

## 2021-06-24 NOTE — TELEPHONE ENCOUNTER
I asked the reading  to ask the radiologist to please read CXR  Tacos Keys PA-C did review films no obvious pneumothorax seen  Patient was notified of above  Will call him with final read  His SOB or cough are not any worse

## 2021-06-24 NOTE — TELEPHONE ENCOUNTER
Akua Brothers called me with complaint of increased SOB and cough  This started during the night  He denies fever or chills  He said he feels " bubbles in his chest"  Advised patient to go for CXR now  If SOB worsens to go to ED

## 2021-06-24 NOTE — PROGRESS NOTES
I met with Kvng Olguin at his visit with Dr Cher Gamboa yesterday  He's familiar with me from prior visits  Questions answered, support provided  He was given the [address] [place] [address] Patient Guidebook and my business card for any future questions

## 2021-06-24 NOTE — TELEPHONE ENCOUNTER
I called and left a message for Tushar Meckel  I reviewed his x-ray from today and compared to the x-ray on 06/22  To me this looks completely normal   There is no evidence of pneumothorax  There is no effusion  There is normal postoperative changes  No other concerning findings  I left a message stating all of this  I have asked her to call us back if she has any questions

## 2021-07-14 DIAGNOSIS — I10 ESSENTIAL HYPERTENSION: ICD-10-CM

## 2021-07-14 RX ORDER — TOPIRAMATE 50 MG/1
50 TABLET, FILM COATED ORAL 2 TIMES DAILY
Qty: 60 TABLET | Refills: 3 | Status: SHIPPED | OUTPATIENT
Start: 2021-07-14 | End: 2022-02-07

## 2021-07-14 RX ORDER — AMLODIPINE BESYLATE 5 MG/1
5 TABLET ORAL DAILY
Qty: 30 TABLET | Refills: 5 | Status: SHIPPED | OUTPATIENT
Start: 2021-07-14 | End: 2022-02-07

## 2021-07-14 RX ORDER — QUETIAPINE FUMARATE 50 MG/1
50 TABLET, FILM COATED ORAL
Qty: 30 TABLET | Refills: 5 | Status: SHIPPED | OUTPATIENT
Start: 2021-07-14 | End: 2022-07-12 | Stop reason: ALTCHOICE

## 2021-07-14 NOTE — TELEPHONE ENCOUNTER
Pt called asking for refills on his depression med he was put on by another provider  He states its 50mg but on his chart its marked at 25mg? Pt also needs topirimate and amlodipine  Please advise

## 2021-07-14 NOTE — TELEPHONE ENCOUNTER
He was supposed to get outpt counselling(he had been inpt) and 50mg is reasonable dose so it may have been increased   Ok to fill and update our med list

## 2021-08-06 ENCOUNTER — VBI (OUTPATIENT)
Dept: ADMINISTRATIVE | Facility: OTHER | Age: 51
End: 2021-08-06

## 2021-08-10 ENCOUNTER — HOSPITAL ENCOUNTER (EMERGENCY)
Facility: HOSPITAL | Age: 51
Discharge: HOME | End: 2021-08-10
Attending: EMERGENCY MEDICINE | Admitting: EMERGENCY MEDICINE
Payer: COMMERCIAL

## 2021-08-10 ENCOUNTER — TELEPHONE (OUTPATIENT)
Dept: INTERNAL MEDICINE CLINIC | Facility: CLINIC | Age: 51
End: 2021-08-10

## 2021-08-10 ENCOUNTER — APPOINTMENT (EMERGENCY)
Dept: CT IMAGING | Facility: HOSPITAL | Age: 51
End: 2021-08-10
Payer: COMMERCIAL

## 2021-08-10 VITALS
SYSTOLIC BLOOD PRESSURE: 134 MMHG | BODY MASS INDEX: 21.8 KG/M2 | HEART RATE: 59 BPM | HEIGHT: 72 IN | TEMPERATURE: 98.1 F | RESPIRATION RATE: 12 BRPM | DIASTOLIC BLOOD PRESSURE: 79 MMHG | WEIGHT: 160.94 LBS | OXYGEN SATURATION: 99 %

## 2021-08-10 DIAGNOSIS — E55.9 VITAMIN D DEFICIENCY: ICD-10-CM

## 2021-08-10 DIAGNOSIS — R53.83 FATIGUE, UNSPECIFIED TYPE: Primary | ICD-10-CM

## 2021-08-10 DIAGNOSIS — R07.89 ATYPICAL CHEST PAIN: Primary | ICD-10-CM

## 2021-08-10 LAB
ANION GAP SERPL CALCULATED.3IONS-SCNC: 8 MMOL/L (ref 4–13)
BASOPHILS # BLD AUTO: 0.11 THOUSANDS/ΜL (ref 0–0.1)
BASOPHILS NFR BLD AUTO: 2 % (ref 0–1)
BUN SERPL-MCNC: 10 MG/DL (ref 5–25)
CALCIUM SERPL-MCNC: 8.9 MG/DL (ref 8.3–10.1)
CHLORIDE SERPL-SCNC: 104 MMOL/L (ref 100–108)
CO2 SERPL-SCNC: 25 MMOL/L (ref 21–32)
CREAT SERPL-MCNC: 0.8 MG/DL (ref 0.6–1.3)
EOSINOPHIL # BLD AUTO: 0.2 THOUSAND/ΜL (ref 0–0.61)
EOSINOPHIL NFR BLD AUTO: 3 % (ref 0–6)
ERYTHROCYTE [DISTWIDTH] IN BLOOD BY AUTOMATED COUNT: 13.1 % (ref 11.6–15.1)
GFR SERPL CREATININE-BSD FRML MDRD: 103 ML/MIN/1.73SQ M
GLUCOSE SERPL-MCNC: 85 MG/DL (ref 65–140)
HCT VFR BLD AUTO: 42.3 % (ref 36.5–49.3)
HGB BLD-MCNC: 14.5 G/DL (ref 12–17)
IMM GRANULOCYTES # BLD AUTO: 0.02 THOUSAND/UL (ref 0–0.2)
IMM GRANULOCYTES NFR BLD AUTO: 0 % (ref 0–2)
LYMPHOCYTES # BLD AUTO: 1.7 THOUSANDS/ΜL (ref 0.6–4.47)
LYMPHOCYTES NFR BLD AUTO: 25 % (ref 14–44)
MCH RBC QN AUTO: 32.6 PG (ref 26.8–34.3)
MCHC RBC AUTO-ENTMCNC: 34.3 G/DL (ref 31.4–37.4)
MCV RBC AUTO: 95 FL (ref 82–98)
MONOCYTES # BLD AUTO: 0.6 THOUSAND/ΜL (ref 0.17–1.22)
MONOCYTES NFR BLD AUTO: 9 % (ref 4–12)
NEUTROPHILS # BLD AUTO: 4.3 THOUSANDS/ΜL (ref 1.85–7.62)
NEUTS SEG NFR BLD AUTO: 61 % (ref 43–75)
NRBC BLD AUTO-RTO: 0 /100 WBCS
PLATELET # BLD AUTO: 315 THOUSANDS/UL (ref 149–390)
PMV BLD AUTO: 8.2 FL (ref 8.9–12.7)
POTASSIUM SERPL-SCNC: 4 MMOL/L (ref 3.5–5.3)
RBC # BLD AUTO: 4.45 MILLION/UL (ref 3.88–5.62)
SODIUM SERPL-SCNC: 137 MMOL/L (ref 136–145)
TROPONIN I SERPL-MCNC: <0.02 NG/ML
TSH SERPL DL<=0.05 MIU/L-ACNC: 2.82 UIU/ML (ref 0.36–3.74)
WBC # BLD AUTO: 6.93 THOUSAND/UL (ref 4.31–10.16)

## 2021-08-10 PROCEDURE — 96361 HYDRATE IV INFUSION ADD-ON: CPT

## 2021-08-10 PROCEDURE — 85025 COMPLETE CBC W/AUTO DIFF WBC: CPT | Performed by: EMERGENCY MEDICINE

## 2021-08-10 PROCEDURE — 84443 ASSAY THYROID STIM HORMONE: CPT | Performed by: EMERGENCY MEDICINE

## 2021-08-10 PROCEDURE — 71260 CT THORAX DX C+: CPT

## 2021-08-10 PROCEDURE — 93005 ELECTROCARDIOGRAM TRACING: CPT

## 2021-08-10 PROCEDURE — 99285 EMERGENCY DEPT VISIT HI MDM: CPT

## 2021-08-10 PROCEDURE — 96360 HYDRATION IV INFUSION INIT: CPT

## 2021-08-10 PROCEDURE — 99285 EMERGENCY DEPT VISIT HI MDM: CPT | Performed by: EMERGENCY MEDICINE

## 2021-08-10 PROCEDURE — 84484 ASSAY OF TROPONIN QUANT: CPT | Performed by: EMERGENCY MEDICINE

## 2021-08-10 PROCEDURE — 36415 COLL VENOUS BLD VENIPUNCTURE: CPT | Performed by: EMERGENCY MEDICINE

## 2021-08-10 PROCEDURE — 86618 LYME DISEASE ANTIBODY: CPT | Performed by: EMERGENCY MEDICINE

## 2021-08-10 PROCEDURE — 80048 BASIC METABOLIC PNL TOTAL CA: CPT | Performed by: EMERGENCY MEDICINE

## 2021-08-10 RX ORDER — DOXYCYCLINE HYCLATE 100 MG/1
100 CAPSULE ORAL 2 TIMES DAILY
Qty: 14 CAPSULE | Refills: 0 | Status: SHIPPED | OUTPATIENT
Start: 2021-08-10 | End: 2021-08-17

## 2021-08-10 RX ORDER — AMOXICILLIN AND CLAVULANATE POTASSIUM 875; 125 MG/1; MG/1
1 TABLET, FILM COATED ORAL EVERY 12 HOURS
Qty: 14 TABLET | Refills: 0 | Status: SHIPPED | OUTPATIENT
Start: 2021-08-10 | End: 2021-08-17

## 2021-08-10 RX ORDER — DOXYCYCLINE HYCLATE 100 MG/1
100 CAPSULE ORAL ONCE
Status: COMPLETED | OUTPATIENT
Start: 2021-08-10 | End: 2021-08-10

## 2021-08-10 RX ORDER — AMOXICILLIN AND CLAVULANATE POTASSIUM 875; 125 MG/1; MG/1
1 TABLET, FILM COATED ORAL ONCE
Status: COMPLETED | OUTPATIENT
Start: 2021-08-10 | End: 2021-08-10

## 2021-08-10 RX ADMIN — SODIUM CHLORIDE 1000 ML: 0.9 INJECTION, SOLUTION INTRAVENOUS at 19:47

## 2021-08-10 RX ADMIN — AMOXICILLIN AND CLAVULANATE POTASSIUM 1 TABLET: 875; 125 TABLET, FILM COATED ORAL at 22:17

## 2021-08-10 RX ADMIN — IOHEXOL 85 ML: 350 INJECTION, SOLUTION INTRAVENOUS at 20:49

## 2021-08-10 RX ADMIN — DOXYCYCLINE 100 MG: 100 CAPSULE ORAL at 22:16

## 2021-08-10 NOTE — TELEPHONE ENCOUNTER
Labs are placed tell patient to get those this week so I have more information  Can you put him on cancel list for next week assuming his labs are completed by then

## 2021-08-11 NOTE — ED PROVIDER NOTES
Final Diagnosis:  1  Atypical chest pain        Chief Complaint   Patient presents with    Chest Pain     pt has had chest pain for about 2 days that is sharp 7/10  also c/o weakness and SOB  had right upper lobectomy in june HPI  Patient presents for evaluation of chest pain  Patient states that he has been having intermittent, left-sided chest pain for the past 2-3 days  He describes it as a shocking sensation over his left pectoral muscle that last couple seconds when it occurs  Approximately happened 5 times yesterday  No triggering factors  No relieving factors  Denies any palpitations, nausea or vomiting, arm pain during this time  No shortness of breath  Patient had a right upper lobectomy in June of this year  Since before then he was complaining of a diffuse, generalized weakness  He states that he does not feel he has the energy that he normally does  Becomes very tired very quickly throughout the day  States that he attempted follow-up with primary care provider for this but they were not able to see him for some time  Denies any known thyroid issues  No heat or cold sensitivity  When discussing his weakness he states that he did have a tick on him a couple months ago  He has no idea how long the tick was on him for  Patient denies any fever chills, sick contacts  Patient did not receive coronavirus vaccines but states that he did have coronavirus last year  Unless otherwise specified:  - No language barrier    - History obtained from patient  - There are no limitations to the history obtained  - Previous charting was reviewed    PMH:   has a past medical history of Ankylosing spondylitis of site in spine ([facility] ), Anxiety, Cancer ([facility] ), Chronic pain, Chronic pain disorder, Depression, Diverticulitis of colon, GERD (gastroesophageal reflux disease), History of COVID-19 (01/04/2021), Hypertension, Pneumonia, Psychiatric disorder, and Rectal lesion      PSH:   has a past surgical history that includes ORIF forearm fracture (Left); Bariatric Surgery (08/2011); pr colonoscopy flx dx w/collj spec when pfrmd (N/A, 4/24/2019); pr esophagogastroduodenoscopy transoral diagnostic (N/A, 4/24/2019); Colonoscopy; pr laryngoscopy,dirct,op,biopsy (N/A, 5/16/2019); CHOLECYSTECTOMY LAPAROSCOPIC (N/A, 5/15/2020); Cholecystectomy; pr bronchoscopy needle bx trachea main stem&/bron (N/A, 5/11/2021); pr bronchoscopy,diagnostic (N/A, 5/11/2021); BRONCHOSCOPY (N/A, 5/11/2021); pr thoracoscopy surg lobectomy (Right, 6/7/2021); pr bronchoscopy,diagnostic (N/A, 6/7/2021); and IR chest tube placement (6/15/2021)  ROS:  Review of Systems   -   - 13 point ROS was performed and all are normal unless stated in the history above  - Nursing note reviewed  Vitals reviewed  - Orders placed by myself and/or advanced practitioner / resident  PE:   Vitals:    08/10/21 1924 08/10/21 2000 08/10/21 2030   BP: 145/83 128/80 134/79   BP Location: Right arm Left arm Left arm   Pulse: 69 61 59   Resp: 21 15 12   Temp: 98 1 °F (36 7 °C)     TempSrc: Temporal     SpO2: 100% 99% 99%   Weight: 73 kg (160 lb 15 oz)     Height: 6' (1 829 m)       Vitals reviewed by me  General: VS reviewed  Appears in NAD  awake, alert  Head: Normocephalic, atraumatic  Eyes: EOM-I  No exudate  ENT: Atraumatic external nose and ears  No malocclusion  No stridor  Normal phonation  No drooling  Normal swallowing  Neck: No JVD  CV: No pallor noted  Lungs:   No tachypnea  No respiratory distress    Abdomen:  Soft, non-tender, non-distended    MSK:   FROM spontaneously  No obvious deformity    Skin: Dry, intact  No obvious rash  Neuro: Awake, alert, GCS15, CN II-XII grossly intact  Speaking in full sentences  Motor grossly intact  Psychiatric/Behavioral: Appropriate mood and affect   Exam: deferred    Physical Exam     A:  - Nursing note reviewed      HEART Risk Score      Most Recent Value   Heart Score Risk Calculator   History  0 Filed at: 08/10/2021 2120   ECG  1 Filed at: 08/10/2021 2120   Age  1 Filed at: 08/10/2021 2120   Risk Factors  1 Filed at: 08/10/2021 2120   Troponin  0 Filed at: 08/10/2021 2120   HEART Score  3 Filed at: 08/10/2021 2120                     ED Course as of Aug 10 2211   Tue Aug 10, 2021   1959 Procedure Note: EKG  Date/Time: 08/10/21 7:57 PM   Interpreted by: Aníbal Prater  Indications / Diagnosis: CP  ECG reviewed by me, the ED Provider: yes   The EKG demonstrates:  Rhythm: normal sinus  Intervals: normal intervals  Axis: normal axis  QRS/Blocks: normal QRS  ST Changes: No acute ST Changes, no STD/LUIS  RBBB  No diffuse elevations to indicate pericarditis  No coved ST elevations greater than 2mm with negative T waves in V1-3 to indicate concern for brugada  No biphasic T waves in V2, V3 to indicate [facility] (LAD)  No elevation in aVR or deviation when compared to V1 (can be associated with ST depression in I,II, V4-6 when left main occlusion is present)  CT chest with contrast   Final Result      Postsurgical changes in the right upper lobe from lung resection  Scattered airspace opacities in the right lower lobe which may represent infection  Recommend short-term follow-up CT scan of the chest in 3 months to evaluate for resolution      The study was marked in EPIC for immediate notification           Workstation performed: [id number]           Orders Placed This Encounter   Procedures    CT chest with contrast    CBC and differential    Basic metabolic panel    TSH, 3rd generation with Free T4 reflex    Troponin I    Lyme Antibody Profile with reflex to WB    Insert peripheral IV     Labs Reviewed   CBC AND DIFFERENTIAL - Abnormal       Result Value Ref Range Status    WBC 6 93  4 31 - 10 16 Thousand/uL Final    RBC 4 45  3 88 - 5 62 Million/uL Final    Hemoglobin 14 5  12 0 - 17 0 g/dL Final    Hematocrit 42 3  36 5 - 49 3 % Final    MCV 95  82 - 98 fL Final MCH 32 6  26 8 - 34 3 pg Final    MCHC 34 3  31 4 - 37 4 g/dL Final    RDW 13 1  11 6 - 15 1 % Final    MPV 8 2 (*) 8 9 - 12 7 fL Final    Platelets 454  412 - 390 Thousands/uL Final    nRBC 0  /100 WBCs Final    Neutrophils Relative 61  43 - 75 % Final    Immat GRANS % 0  0 - 2 % Final    Lymphocytes Relative 25  14 - 44 % Final    Monocytes Relative 9  4 - 12 % Final    Eosinophils Relative 3  0 - 6 % Final    Basophils Relative 2 (*) 0 - 1 % Final    Neutrophils Absolute 4 30  1 85 - 7 62 Thousands/µL Final    Immature Grans Absolute 0 02  0 00 - 0 20 Thousand/uL Final    Lymphocytes Absolute 1 70  0 60 - 4 47 Thousands/µL Final    Monocytes Absolute 0 60  0 17 - 1 22 Thousand/µL Final    Eosinophils Absolute 0 20  0 00 - 0 61 Thousand/µL Final    Basophils Absolute 0 11 (*) 0 00 - 0 10 Thousands/µL Final   TSH, 3RD GENERATION WITH FREE T4 REFLEX - Normal    TSH 3RD GENERATON 2 818  0 358 - 3 740 uIU/mL Final    Narrative:     Patients undergoing fluorescein dye angiography may retain small amounts of fluorescein in the body for 48-72 hours post procedure  Samples containing fluorescein can produce falsely depressed TSH values  If the patient had this procedure,a specimen should be resubmitted post fluorescein clearance  TROPONIN I - Normal    Troponin I <0 02  <=0 04 ng/mL Final    Comment: 3Autovalidation override  [employer] Chemistry analyzer 99% cutoff is > 0 04 ng/mL in network labs     o cTnI 99% cutoff is useful only when applied to patients in the clinical setting of myocardial ischemia   o cTnI 99% cutoff should be interpreted in the context of clinical history, ECG findings and possibly cardiac imaging to establish correct diagnosis  o cTnI 99% cutoff may be suggestive but clearly not indicative of a coronary event without the clinical setting of myocardial ischemia       BASIC METABOLIC PANEL    Sodium 325  136 - 145 mmol/L Final    Potassium 4 0  3 5 - 5 3 mmol/L Final    Chloride 104  100 - 108 mmol/L Final    CO2 25  21 - 32 mmol/L Final    ANION GAP 8  4 - 13 mmol/L Final    BUN 10  5 - 25 mg/dL Final    Creatinine 0 80  0 60 - 1 30 mg/dL Final    Comment: Standardized to IDMS reference method    Glucose 85  65 - 140 mg/dL Final    Comment: If the patient is fasting, the ADA then defines impaired fasting glucose as > 100 mg/dL and diabetes as > or equal to 123 mg/dL  Specimen collection should occur prior to Sulfasalazine administration due to the potential for falsely depressed results  Specimen collection should occur prior to Sulfapyridine administration due to the potential for falsely elevated results  Calcium 8 9  8 3 - 10 1 mg/dL Final    eGFR 103  ml/min/1 73sq m Final    Narrative:     [place] guidelines for Chronic Kidney Disease (CKD):     Stage 1 with normal or high GFR (GFR > 90 mL/min/1 73 square meters)    Stage 2 Mild CKD (GFR = 60-89 mL/min/1 73 square meters)    Stage 3A Moderate CKD (GFR = 45-59 mL/min/1 73 square meters)    Stage 3B Moderate CKD (GFR = 30-44 mL/min/1 73 square meters)    Stage 4 Severe CKD (GFR = 15-29 mL/min/1 73 square meters)    Stage 5 End Stage CKD (GFR <15 mL/min/1 73 square meters)  Note: GFR calculation is accurate only with a steady state creatinine   LYME TOTAL AB PROFILE W/REFLEX TO WB         Final Diagnosis:  1  Atypical chest pain        P:  - laboratory analysis unremarkable  CT scan of the chest showed postsurgical said his shins and an area concerning for possible infection  Given this read was provided with antibiotics  Instructed follow-up with primary care provider  Return precautions discussed      Medications   doxycycline hyclate (VIBRAMYCIN) capsule 100 mg (has no administration in time range)   amoxicillin-clavulanate (AUGMENTIN) 875-125 mg per tablet 1 tablet (has no administration in time range)   sodium chloride 0 9 % bolus 1,000 mL (1,000 mL Intravenous New Bag 8/10/21 1947)   iohexol (OMNIPAQUE) 350 MG/ML injection (SINGLE-DOSE) 85 mL (85 mL Intravenous Given 8/10/21 2049)     Time reflects when diagnosis was documented in both MDM as applicable and the Disposition within this note     Time User Action Codes Description Comment    8/10/2021  9:48 PM Tiny Valencia Add [R07 [id number]] Atypical chest pain       ED Disposition     ED Disposition Condition Date/Time Comment    Discharge Stable Tue Aug 10, 2021  9:48 PM Santo Mantle III discharge to home/self care  Follow-up Information     Follow up With Specialties Details Why [address] [facility],  Internal Medicine Schedule an appointment as soon as possible for a visit   [address]  [address]  [phone]          Patient's Medications   Discharge Prescriptions    AMOXICILLIN-CLAVULANATE (AUGMENTIN) 875-125 MG PER TABLET    Take 1 tablet by mouth every 12 (twelve) hours for 7 days       Start Date: 8/10/2021 End Date: 8/17/2021       Order Dose: 1 tablet       Quantity: 14 tablet    Refills: 0    DOXYCYCLINE HYCLATE (VIBRAMYCIN) 100 MG CAPSULE    Take 1 capsule (100 mg total) by mouth 2 (two) times a day for 7 days       Start Date: 8/10/2021 End Date: 8/17/2021       Order Dose: 100 mg       Quantity: 14 capsule    Refills: 0     No discharge procedures on file  Prior to Admission Medications   Prescriptions Last Dose Informant Patient Reported? Taking?    Multiple Vitamins-Minerals (MULTI COMPLETE) CAPS  Self Yes Yes   Sig: Take by mouth daily    QUEtiapine (SEROquel) 50 mg tablet   No Yes   Sig: Take 1 tablet (50 mg total) by mouth daily at bedtime   amLODIPine (NORVASC) 5 mg tablet   No Yes   Sig: Take 1 tablet (5 mg total) by mouth daily   ibuprofen (MOTRIN) 600 mg tablet   No No   Sig: Take 1 tablet (600 mg total) by mouth every 8 (eight) hours for 7 days   omeprazole (PriLOSEC) 40 MG capsule  Self No Yes   Sig: Take 1 capsule (40 mg total) by mouth daily   topiramate (TOPAMAX) 50 MG tablet   No Yes   Sig: Take 1 tablet (50 mg total) by mouth 2 (two) times a day   traZODone (DESYREL) 50 mg tablet   No Yes   Sig: Take 2 tablets (100 mg total) by mouth daily at bedtime      Facility-Administered Medications: None       Portions of the record may have been created with voice recognition software  Occasional wrong word or "sound a like" substitutions may have occurred due to the inherent limitations of voice recognition software  Read the chart carefully and recognize, using context, where substitutions have occurred      Electronically signed by:  MD Bebo Odonnell MD  08/10/21 4537

## 2021-08-12 LAB — B BURGDOR IGG+IGM SER-ACNC: 14

## 2021-08-17 LAB
ATRIAL RATE: 64 BPM
ATRIAL RATE: 65 BPM
P AXIS: 72 DEGREES
P AXIS: 76 DEGREES
PR INTERVAL: 142 MS
PR INTERVAL: 144 MS
QRS AXIS: -31 DEGREES
QRS AXIS: -41 DEGREES
QRSD INTERVAL: 136 MS
QRSD INTERVAL: 146 MS
QT INTERVAL: 428 MS
QT INTERVAL: 438 MS
QTC INTERVAL: 445 MS
QTC INTERVAL: 451 MS
T WAVE AXIS: 47 DEGREES
T WAVE AXIS: 58 DEGREES
VENTRICULAR RATE: 64 BPM
VENTRICULAR RATE: 65 BPM

## 2021-08-17 PROCEDURE — 93010 ELECTROCARDIOGRAM REPORT: CPT | Performed by: INTERNAL MEDICINE

## 2021-08-25 ENCOUNTER — OFFICE VISIT (OUTPATIENT)
Dept: FAMILY MEDICINE CLINIC | Facility: CLINIC | Age: 51
End: 2021-08-25
Payer: COMMERCIAL

## 2021-08-25 VITALS
BODY MASS INDEX: 21.05 KG/M2 | SYSTOLIC BLOOD PRESSURE: 118 MMHG | DIASTOLIC BLOOD PRESSURE: 70 MMHG | TEMPERATURE: 98.6 F | HEIGHT: 72 IN | WEIGHT: 155.38 LBS

## 2021-08-25 DIAGNOSIS — I27.20 MILD PULMONARY HYPERTENSION (HCC): ICD-10-CM

## 2021-08-25 DIAGNOSIS — F41.9 ANXIETY: ICD-10-CM

## 2021-08-25 DIAGNOSIS — R06.02 SOB (SHORTNESS OF BREATH): Primary | ICD-10-CM

## 2021-08-25 DIAGNOSIS — D38.1 NEOPLASM OF UNCERTAIN BEHAVIOR OF RIGHT UPPER LOBE OF LUNG: ICD-10-CM

## 2021-08-25 PROCEDURE — 3008F BODY MASS INDEX DOCD: CPT | Performed by: INTERNAL MEDICINE

## 2021-08-25 PROCEDURE — 1036F TOBACCO NON-USER: CPT | Performed by: INTERNAL MEDICINE

## 2021-08-25 PROCEDURE — 99214 OFFICE O/P EST MOD 30 MIN: CPT | Performed by: INTERNAL MEDICINE

## 2021-08-25 RX ORDER — BUSPIRONE HYDROCHLORIDE 5 MG/1
5 TABLET ORAL 2 TIMES DAILY
Qty: 60 TABLET | Refills: 5 | Status: SHIPPED | OUTPATIENT
Start: 2021-08-25 | End: 2022-07-12 | Stop reason: ALTCHOICE

## 2021-08-25 RX ORDER — MIRTAZAPINE 7.5 MG/1
7.5 TABLET, FILM COATED ORAL
Qty: 30 TABLET | Refills: 5 | Status: SHIPPED | OUTPATIENT
Start: 2021-08-25 | End: 2022-07-12 | Stop reason: ALTCHOICE
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
## 2021-08-30 ENCOUNTER — HOSPITAL ENCOUNTER (OUTPATIENT)
Dept: RADIOLOGY | Facility: HOSPITAL | Age: 51
Discharge: HOME | End: 2021-08-30
Attending: INTERNAL MEDICINE
Payer: COMMERCIAL

## 2021-08-30 DIAGNOSIS — R06.02 SOB (SHORTNESS OF BREATH): ICD-10-CM

## 2021-08-30 PROCEDURE — 71046 X-RAY EXAM CHEST 2 VIEWS: CPT

## 2021-11-16 ENCOUNTER — VBI (OUTPATIENT)
Dept: ADMINISTRATIVE | Facility: OTHER | Age: 51
End: 2021-11-16

## 2021-11-22 DIAGNOSIS — K21.9 GASTROESOPHAGEAL REFLUX DISEASE: ICD-10-CM

## 2021-11-22 RX ORDER — OMEPRAZOLE 40 MG/1
40 CAPSULE, DELAYED RELEASE ORAL DAILY
Qty: 90 CAPSULE | Refills: 0 | Status: SHIPPED | OUTPATIENT
Start: 2021-11-22 | End: 2022-05-24 | Stop reason: SDUPTHER

## 2021-12-10 ENCOUNTER — TELEPHONE (OUTPATIENT)
Dept: HEMATOLOGY ONCOLOGY | Facility: CLINIC | Age: 51
End: 2021-12-10

## 2021-12-22 ENCOUNTER — APPOINTMENT (OUTPATIENT)
Dept: LAB | Facility: MEDICAL CENTER | Age: 51
End: 2021-12-22
Payer: COMMERCIAL

## 2021-12-22 ENCOUNTER — APPOINTMENT (OUTPATIENT)
Dept: RADIOLOGY | Facility: MEDICAL CENTER | Age: 51
End: 2021-12-22
Payer: COMMERCIAL

## 2021-12-22 ENCOUNTER — OFFICE VISIT (OUTPATIENT)
Dept: FAMILY MEDICINE CLINIC | Facility: CLINIC | Age: 51
End: 2021-12-22
Payer: COMMERCIAL

## 2021-12-22 VITALS
HEART RATE: 78 BPM | WEIGHT: 162 LBS | SYSTOLIC BLOOD PRESSURE: 124 MMHG | BODY MASS INDEX: 21.94 KG/M2 | RESPIRATION RATE: 18 BRPM | HEIGHT: 72 IN | DIASTOLIC BLOOD PRESSURE: 78 MMHG

## 2021-12-22 DIAGNOSIS — M45.9 ANKYLOSING SPONDYLITIS, UNSPECIFIED SITE OF SPINE (HCC): ICD-10-CM

## 2021-12-22 DIAGNOSIS — M25.542 ARTHRALGIA OF BOTH HANDS: ICD-10-CM

## 2021-12-22 DIAGNOSIS — Z00.00 MEDICARE ANNUAL WELLNESS VISIT, SUBSEQUENT: Primary | ICD-10-CM

## 2021-12-22 DIAGNOSIS — M25.541 ARTHRALGIA OF BOTH HANDS: ICD-10-CM

## 2021-12-22 DIAGNOSIS — F51.01 PRIMARY INSOMNIA: ICD-10-CM

## 2021-12-22 DIAGNOSIS — Z12.5 SCREENING FOR PROSTATE CANCER: ICD-10-CM

## 2021-12-22 DIAGNOSIS — J43.9 PULMONARY EMPHYSEMA, UNSPECIFIED EMPHYSEMA TYPE (HCC): ICD-10-CM

## 2021-12-22 LAB
CK SERPL-CCNC: 68 U/L (ref 39–308)
PSA SERPL-MCNC: 0.4 NG/ML (ref 0–4)

## 2021-12-22 PROCEDURE — 1036F TOBACCO NON-USER: CPT | Performed by: INTERNAL MEDICINE

## 2021-12-22 PROCEDURE — 72110 X-RAY EXAM L-2 SPINE 4/>VWS: CPT

## 2021-12-22 PROCEDURE — 36415 COLL VENOUS BLD VENIPUNCTURE: CPT

## 2021-12-22 PROCEDURE — 82550 ASSAY OF CK (CPK): CPT

## 2021-12-22 PROCEDURE — 3008F BODY MASS INDEX DOCD: CPT | Performed by: INTERNAL MEDICINE

## 2021-12-22 PROCEDURE — 86618 LYME DISEASE ANTIBODY: CPT

## 2021-12-22 PROCEDURE — G0439 PPPS, SUBSEQ VISIT: HCPCS | Performed by: INTERNAL MEDICINE

## 2021-12-22 PROCEDURE — G0103 PSA SCREENING: HCPCS

## 2021-12-22 PROCEDURE — 73130 X-RAY EXAM OF HAND: CPT

## 2021-12-22 RX ORDER — FLUTICASONE FUROATE AND VILANTEROL TRIFENATATE 100; 25 UG/1; UG/1
1 POWDER RESPIRATORY (INHALATION) DAILY
Qty: 60 BLISTER | Refills: 0 | Status: SHIPPED | OUTPATIENT
Start: 2021-12-22 | End: 2022-07-12

## 2021-12-22 RX ORDER — TRAZODONE HYDROCHLORIDE 50 MG/1
100 TABLET ORAL
Qty: 90 TABLET | Refills: 3 | Status: SHIPPED | OUTPATIENT
Start: 2021-12-22 | End: 2022-07-27

## 2021-12-22 RX ORDER — MELOXICAM 7.5 MG/1
7.5 TABLET ORAL DAILY
Qty: 30 TABLET | Refills: 2 | Status: SHIPPED | OUTPATIENT
Start: 2021-12-22 | End: 2022-07-12 | Stop reason: ALTCHOICE

## 2021-12-23 LAB — B BURGDOR IGG+IGM SER-ACNC: 35

## 2022-01-04 DIAGNOSIS — M54.9 ACUTE BACK PAIN, UNSPECIFIED BACK LOCATION, UNSPECIFIED BACK PAIN LATERALITY: Primary | ICD-10-CM

## 2022-01-07 ENCOUNTER — TELEPHONE (OUTPATIENT)
Dept: FAMILY MEDICINE CLINIC | Facility: CLINIC | Age: 52
End: 2022-01-07

## 2022-01-07 NOTE — TELEPHONE ENCOUNTER
Pt's wife said pt has been having severe abdominal pain with swelling for about a week now  She said it's mainly his right side that swollen, but isn't sure what to do for him  Please advise

## 2022-01-19 ENCOUNTER — OFFICE VISIT (OUTPATIENT)
Dept: FAMILY MEDICINE CLINIC | Facility: CLINIC | Age: 52
End: 2022-01-19
Payer: COMMERCIAL

## 2022-01-19 VITALS
BODY MASS INDEX: 23.35 KG/M2 | WEIGHT: 172.4 LBS | SYSTOLIC BLOOD PRESSURE: 124 MMHG | DIASTOLIC BLOOD PRESSURE: 78 MMHG | HEART RATE: 76 BPM | HEIGHT: 72 IN | RESPIRATION RATE: 18 BRPM | TEMPERATURE: 98.4 F

## 2022-01-19 DIAGNOSIS — M45.9 ANKYLOSING SPONDYLITIS, UNSPECIFIED SITE OF SPINE (HCC): ICD-10-CM

## 2022-01-19 DIAGNOSIS — L98.9 SKIN LESION: Primary | ICD-10-CM

## 2022-01-19 DIAGNOSIS — F33.41 RECURRENT MAJOR DEPRESSIVE DISORDER, IN PARTIAL REMISSION (HCC): ICD-10-CM

## 2022-01-19 DIAGNOSIS — I27.20 MILD PULMONARY HYPERTENSION (HCC): ICD-10-CM

## 2022-01-19 DIAGNOSIS — C34.11 PRIMARY ADENOCARCINOMA OF UPPER LOBE OF RIGHT LUNG (HCC): ICD-10-CM

## 2022-01-19 DIAGNOSIS — J43.9 PULMONARY EMPHYSEMA, UNSPECIFIED EMPHYSEMA TYPE (HCC): ICD-10-CM

## 2022-01-19 PROCEDURE — 1036F TOBACCO NON-USER: CPT | Performed by: INTERNAL MEDICINE

## 2022-01-19 PROCEDURE — 99214 OFFICE O/P EST MOD 30 MIN: CPT | Performed by: INTERNAL MEDICINE

## 2022-01-19 PROCEDURE — 3008F BODY MASS INDEX DOCD: CPT | Performed by: INTERNAL MEDICINE
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
## 2022-01-25 ENCOUNTER — HOSPITAL ENCOUNTER (OUTPATIENT)
Dept: NUCLEAR MEDICINE | Facility: HOSPITAL | Age: 52
Discharge: HOME | End: 2022-01-25
Attending: INTERNAL MEDICINE
Payer: COMMERCIAL

## 2022-01-25 DIAGNOSIS — M54.9 ACUTE BACK PAIN, UNSPECIFIED BACK LOCATION, UNSPECIFIED BACK PAIN LATERALITY: ICD-10-CM

## 2022-01-25 PROCEDURE — A9503 TC99M MEDRONATE: HCPCS

## 2022-01-25 PROCEDURE — G1004 CDSM NDSC: HCPCS

## 2022-01-25 PROCEDURE — 78306 BONE IMAGING WHOLE BODY: CPT

## 2022-02-03 ENCOUNTER — CONSULT (OUTPATIENT)
Dept: SURGERY | Facility: CLINIC | Age: 52
End: 2022-02-03
Payer: COMMERCIAL

## 2022-02-03 VITALS
WEIGHT: 175 LBS | HEART RATE: 68 BPM | HEIGHT: 72 IN | DIASTOLIC BLOOD PRESSURE: 83 MMHG | TEMPERATURE: 98.8 F | SYSTOLIC BLOOD PRESSURE: 127 MMHG | BODY MASS INDEX: 23.7 KG/M2

## 2022-02-03 DIAGNOSIS — L98.9 SKIN LESION: ICD-10-CM

## 2022-02-03 PROCEDURE — 99204 OFFICE O/P NEW MOD 45 MIN: CPT | Performed by: SURGERY

## 2022-02-03 PROCEDURE — 1036F TOBACCO NON-USER: CPT | Performed by: SURGERY

## 2022-02-03 PROCEDURE — 3008F BODY MASS INDEX DOCD: CPT | Performed by: SURGERY

## 2022-02-03 NOTE — ASSESSMENT & PLAN NOTE
6 mm raised skin lesion well-circumscribed black pigmented with occasional bleeding noted along the right flank  It does cause patient some discomfort especially with the intermittent bleeding  He does have a significant risk factor for melanoma considering his dad had melanoma and the same time he has had significant exposure to the sun as a child and as an adult in addition to blistering sunburns  On exam the there is some concern given the size and his risk factors however it does appear well-circumscribed with regular borders  No obvious ulceration noted  We discussed punch biopsy versus excisional biopsy patient wishes to proceed with excisional biopsy we will set the patient up for an office excisional biopsy  Patient understands that if this does come back as a cancer then additional margins must be taken

## 2022-02-03 NOTE — PROGRESS NOTES
Assessment/Plan:    Skin lesion  6 mm raised skin lesion well-circumscribed black pigmented with occasional bleeding noted along the right flank  It does cause patient some discomfort especially with the intermittent bleeding  He does have a significant risk factor for melanoma considering his dad had melanoma and the same time he has had significant exposure to the sun as a child and as an adult in addition to blistering sunburns  On exam the there is some concern given the size and his risk factors however it does appear well-circumscribed with regular borders  No obvious ulceration noted  We discussed punch biopsy versus excisional biopsy patient wishes to proceed with excisional biopsy we will set the patient up for an office excisional biopsy  Patient understands that if this does come back as a cancer then additional margins must be taken  Diagnoses and all orders for this visit:    Skin lesion  -     Ambulatory Referral to General Surgery          Subjective:      Patient ID: Melody Bishop III is a 46 y o  male  68-year-old gentleman with known history of lung cancer status post VATS with resection, hypertension, ankylosing spondylitis, GERD, presents today in consultation for evaluation of a skin lesion of the right flank/thoracic region  Patient states he has had this lesion for least 2 years  No significant changes in size or shape  However he says occasionally becomes irritated and bleeds  He does admit to significant sun exposure as a child and as an adult as he was a  for some time  Does admit to blistering sunburns as a child as an adult  He states that his father did have a history of skin cancer  Denies any other suspicious lesions  Nose significant history of skin cancer himself  No other associated symptoms  Denies any other chest pain shortness of breath or abdominal pain    Patient no longer has any significant sun exposure as he is retired and on disability due to spondylitis      The following portions of the patient's history were reviewed and updated as appropriate:   He  has a past medical history of Ankylosing spondylitis of site in spine ([facility] ), Anxiety, Cancer ([facility] ), Chronic pain, Chronic pain disorder, Depression, Diverticulitis of colon, GERD (gastroesophageal reflux disease), History of COVID-19 (01/04/2021), Hypertension, Pneumonia, Psychiatric disorder, and Rectal lesion  He   Patient Active Problem List    Diagnosis Date Noted    [facility] 01/19/2022    Skin lesion 01/19/2022    SOB (shortness of breath) 08/25/2021    Pneumothorax on right 06/16/2021    Ankylosing spondylitis of cervical region [facility]) 06/07/2021    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 04/13/2021    Centrilobular emphysema [facility] ) 04/13/2021    Recurrent major depressive disorder, in partial remission ([facility] ) 04/05/2021    Medicare annual wellness visit, subsequent 09/30/2019    Gastroesophageal reflux disease 04/15/2019    History of colon polyps 04/15/2019    Erectile dysfunction of non-organic origin 06/01/2016    Hypertension 02/17/2016    Vitamin D insufficiency 02/09/2016    Mild pulmonary hypertension [facility] 02/09/2016    HLA B27 positive 02/08/2016    Thoracic back pain 06/18/2015    Anxiety 06/14/2012    Hyperlipidemia 06/14/2012     He  has a past surgical history that includes ORIF forearm fracture (Left); Bariatric Surgery (08/2011); pr colonoscopy flx dx w/collj spec when pfrmd (N/A, 4/24/2019); pr esophagogastroduodenoscopy transoral diagnostic (N/A, 4/24/2019); Colonoscopy; pr laryngoscopy,dirct,op,biopsy (N/A, 5/16/2019); CHOLECYSTECTOMY LAPAROSCOPIC (N/A, 5/15/2020);  Cholecystectomy; pr bronchoscopy needle bx trachea main stem&/bron (N/A, 5/11/2021); pr bronchoscopy,diagnostic (N/A, 5/11/2021); BRONCHOSCOPY (N/A, 5/11/2021); pr thoracoscopy surg lobectomy (Right, 6/7/2021); pr bronchoscopy,diagnostic (N/A, 6/7/2021); and IR chest tube placement (6/15/2021)  His family history includes Coronary artery disease in his father; Diabetes in his father and mother; Heart disease in his father and mother; Hypertension in his father and mother; Lung cancer in his father, paternal grandfather, and paternal uncle; Stroke in his mother  He  reports that he quit smoking about 8 months ago  His smoking use included cigarettes  He started smoking about 32 years ago  He has a 25 50 pack-year smoking history  He has never used smokeless tobacco  He reports current alcohol use of about 42 0 standard drinks of alcohol per week  He reports that he does not use drugs  Current Outpatient Medications   Medication Sig Dispense Refill    amLODIPine (NORVASC) 5 mg tablet Take 1 tablet (5 mg total) by mouth daily 30 tablet 5    busPIRone (BUSPAR) 5 mg tablet Take 1 tablet (5 mg total) by mouth 2 (two) times a day (Patient not taking: Reported on 1/19/2022 ) 60 tablet 5    fluticasone-vilanterol (Breo Ellipta) 100-25 mcg/inh inhaler Inhale 1 puff daily Rinse mouth after use   60 blister 0    ibuprofen (MOTRIN) 600 mg tablet Take 1 tablet (600 mg total) by mouth every 8 (eight) hours for 7 days 21 tablet 0    meloxicam (Mobic) 7 5 mg tablet Take 1 tablet (7 5 mg total) by mouth daily (Patient not taking: Reported on 1/19/2022 ) 30 tablet 2    mirtazapine (REMERON) 7 5 MG tablet Take 1 tablet (7 5 mg total) by mouth daily at bedtime 30 tablet 5    Multiple Vitamins-Minerals (MULTI COMPLETE) CAPS Take by mouth daily       omeprazole (PriLOSEC) 40 MG capsule Take 1 capsule (40 mg total) by mouth daily 90 capsule 0    QUEtiapine (SEROquel) 50 mg tablet Take 1 tablet (50 mg total) by mouth daily at bedtime 30 tablet 5    topiramate (TOPAMAX) 50 MG tablet Take 1 tablet (50 mg total) by mouth 2 (two) times a day 60 tablet 3    traZODone (DESYREL) 50 mg tablet Take 2 tablets (100 mg total) by mouth daily at bedtime 90 tablet 3     No current facility-administered medications for this visit  He has No Known Allergies       Review of Systems      Review systems completed, all negative except as noted above HPI  Objective:      /83   Pulse 68   Temp 98 8 °F (37 1 °C)   Ht 6' (1 829 m)   Wt 79 4 kg (175 lb)   BMI 23 73 kg/m²          Physical Exam  Vitals reviewed  Constitutional:       General: He is not in acute distress  Appearance: Normal appearance  He is normal weight  He is not ill-appearing, toxic-appearing or diaphoretic  HENT:      Head: Normocephalic and atraumatic  Right Ear: External ear normal       Left Ear: External ear normal    Eyes:      General: No scleral icterus  Right eye: No discharge  Left eye: No discharge  Cardiovascular:      Rate and Rhythm: Normal rate  Pulmonary:      Effort: Pulmonary effort is normal  No respiratory distress  Abdominal:      General: There is no distension  Palpations: Abdomen is soft  Tenderness: There is no abdominal tenderness  Musculoskeletal:         General: No swelling or deformity  Normal range of motion  Cervical back: Normal range of motion  Right lower leg: No edema  Left lower leg: No edema  Skin:     General: Skin is warm  Coloration: Skin is not jaundiced or pale  Findings: Lesion present  No bruising or erythema  Comments: 6 mm pigmented well-circumscribed lesion along the right thoracic/flank region  No evidence of ulceration or bleeding actively  Slightly raised  Borders appear regular and the lesion itself is symmetric  Neurological:      General: No focal deficit present  Mental Status: He is alert and oriented to person, place, and time  Cranial Nerves: No cranial nerve deficit  Psychiatric:         Mood and Affect: Mood normal          Behavior: Behavior normal          Thought Content:  Thought content normal          Judgment: Judgment normal            Note: Office note dated January 19, 2022 personally reviewed by me

## 2022-02-08 ENCOUNTER — VBI (OUTPATIENT)
Dept: ADMINISTRATIVE | Facility: OTHER | Age: 52
End: 2022-02-08

## 2022-03-22 ENCOUNTER — TELEPHONE (OUTPATIENT)
Dept: GYNECOLOGIC ONCOLOGY | Facility: CLINIC | Age: 52
End: 2022-03-22

## 2022-03-24 ENCOUNTER — VBI (OUTPATIENT)
Dept: ADMINISTRATIVE | Facility: OTHER | Age: 52
End: 2022-03-24

## 2022-03-24 NOTE — TELEPHONE ENCOUNTER
03/24/22 9:42 AM     See documentation in the [employer] Smart[employer]       Nathen Fish MA Principal Discharge DX:	Psychosis

## 2022-04-05 ENCOUNTER — HOSPITAL ENCOUNTER (OUTPATIENT)
Dept: CT IMAGING | Facility: HOSPITAL | Age: 52
Discharge: HOME | End: 2022-04-05
Attending: THORACIC SURGERY (CARDIOTHORACIC VASCULAR SURGERY)
Payer: COMMERCIAL

## 2022-04-05 DIAGNOSIS — C34.11 PRIMARY ADENOCARCINOMA OF UPPER LOBE OF RIGHT LUNG (HCC): ICD-10-CM

## 2022-04-05 PROCEDURE — G1004 CDSM NDSC: HCPCS

## 2022-04-05 PROCEDURE — 71250 CT THORAX DX C-: CPT

## 2022-04-06 ENCOUNTER — OFFICE VISIT (OUTPATIENT)
Dept: CARDIAC SURGERY | Facility: CLINIC | Age: 52
End: 2022-04-06
Payer: COMMERCIAL

## 2022-04-06 ENCOUNTER — TELEPHONE (OUTPATIENT)
Dept: SURGERY | Facility: CLINIC | Age: 52
End: 2022-04-06

## 2022-04-06 VITALS
RESPIRATION RATE: 16 BRPM | BODY MASS INDEX: 24.63 KG/M2 | WEIGHT: 181.88 LBS | DIASTOLIC BLOOD PRESSURE: 84 MMHG | TEMPERATURE: 98.8 F | HEART RATE: 73 BPM | HEIGHT: 72 IN | OXYGEN SATURATION: 99 % | SYSTOLIC BLOOD PRESSURE: 128 MMHG

## 2022-04-06 DIAGNOSIS — C34.11 PRIMARY ADENOCARCINOMA OF UPPER LOBE OF RIGHT LUNG (HCC): Primary | ICD-10-CM

## 2022-04-06 PROCEDURE — 99213 OFFICE O/P EST LOW 20 MIN: CPT | Performed by: THORACIC SURGERY (CARDIOTHORACIC VASCULAR SURGERY)

## 2022-04-06 PROCEDURE — 3008F BODY MASS INDEX DOCD: CPT | Performed by: THORACIC SURGERY (CARDIOTHORACIC VASCULAR SURGERY)

## 2022-04-06 PROCEDURE — 1036F TOBACCO NON-USER: CPT | Performed by: THORACIC SURGERY (CARDIOTHORACIC VASCULAR SURGERY)

## 2022-04-06 NOTE — PROGRESS NOTES
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 status post 06/07/2021 robotic right upper lobectomy with some wheezing and shortness of breath    From oncology standpoint Robinson Smalls is doing well with no evidence of recurrent or metastatic disease  We will continue surveillance imaging with a repeat noncontrast CT in 6 months  I will see him back in our office afterwards  He does have some shortness of breath an audible wheezing  I suspect this is multifactorial secondary to his underlying emphysema/lung disease  I would like him to follow-up with Pulmonary Medicine to be seen and evaluated for this  Additionally I have referred him to pulmonary rehab  Hopefully his breathing should improve with medical management and pulmonary rehab    Diagnoses and all orders for this visit:    Primary adenocarcinoma of upper lobe of right lung [facility])  -     CT chest wo contrast; Future  -     Ambulatory Referral to Pulmonary Rehabilitation; Future          Thoracic History   Problem:right upper lobe adenocarcinoma     Procedure: 6/7/21  Robotic right upper lobectomy  6/15/21  Readmitted  For delayed pneumothorax     Pathology: Stage IA2 RUL adenocarcinoma pT1b 2 5 cm RUL adenocarcinoma, N0, M0           Subjective:    Patient ID: Alexia Franco III is a 46 y o  male  HPI  Robinson Smalls comes back to our office 10 months out from robotic right upper lobectomy for a stage I A 2 right upper lobe adenocarcinoma  His biggest issue at this time his wheezing and shortness of breath with exertion  This is been relatively stable since surgery  This is somewhat continuous  This limits his physical activity such as hunting  He has a mild productive cough  No hemoptysis  No fevers or chills  No unintentional weight loss      The following portions of the patient's history were reviewed and updated as appropriate: allergies, current medications, past family history, past medical history, past social history, past surgical history and problem list     Review of Systems   Constitutional: Negative for activity change, appetite change, chills, diaphoresis, fatigue, fever and unexpected weight change  HENT: Negative  Eyes: Negative  Respiratory: Positive for cough, shortness of breath and wheezing  Negative for apnea, chest tightness and stridor  Cardiovascular: Negative for chest pain, palpitations and leg swelling  Gastrointestinal: Negative for abdominal distention, abdominal pain, blood in stool, constipation, diarrhea, nausea and vomiting  Endocrine: Negative  Genitourinary: Negative  Musculoskeletal: Negative  Skin: Negative  Allergic/Immunologic: Negative  Neurological: Negative  Hematological: Negative  Psychiatric/Behavioral: Negative  Objective:   Physical Exam  Vitals and nursing note reviewed  Constitutional:       General: He is not in acute distress  Appearance: Normal appearance  He is well-developed  He is not diaphoretic  HENT:      Head: Normocephalic and atraumatic  Mouth/Throat:      Pharynx: No oropharyngeal exudate  Eyes:      General: No scleral icterus  Conjunctiva/sclera: Conjunctivae normal       Pupils: Pupils are equal, round, and reactive to light  Neck:      Thyroid: No thyromegaly  Vascular: No JVD  Trachea: No tracheal deviation  Cardiovascular:      Rate and Rhythm: Normal rate and regular rhythm  Heart sounds: Normal heart sounds  No murmur heard  No friction rub  No gallop  Pulmonary:      Effort: No respiratory distress  Breath sounds: Wheezing present  No rales  Comments: Audible wheezing from across the room  No definite stridor  Right side worse than left  Right robotic incisions are well healed  Chest:      Chest wall: No tenderness  Abdominal:      General: Bowel sounds are normal  There is no distension  Palpations: Abdomen is soft  There is no mass  Tenderness: There is no abdominal tenderness  There is no guarding or rebound  Musculoskeletal:         General: No tenderness or deformity  Normal range of motion  Cervical back: Normal range of motion and neck supple  Skin:     General: Skin is warm and dry  Coloration: Skin is not pale  Findings: No erythema or rash  Neurological:      Mental Status: He is alert and oriented to person, place, and time  Psychiatric:         Behavior: Behavior normal          Thought Content: Thought content normal          Judgment: Judgment normal      /84 (BP Location: Left arm, Patient Position: Sitting, Cuff Size: Standard)   Pulse 73   Temp 98 8 °F (37 1 °C) (Temporal)   Resp 16   Ht 6' (1 829 m)   Wt 82 5 kg (181 lb 14 1 oz)   SpO2 99%   BMI 24 67 kg/m²     XR chest pa & lateral    Result Date: 9/4/2021  Impression Mild peripheral groundglass opacity in the peripheral right upper lung, at the site of groundglass opacity on the chest CT from 8/10/2021, likely post infectious or postinflammatory scar  Workstation performed: [id number]     XR chest pa & lateral    Result Date: 6/24/2021  Impression No acute cardiopulmonary disease  Workstation performed: [id number]     XR chest pa & lateral    Result Date: 6/24/2021  Impression No acute cardiopulmonary disease  Resolved right apical pneumothorax  Workstation performed: [id number]     XR chest pa & lateral    Result Date: 6/18/2021  Impression Recurrent trace right apical pneumothorax  Workstation performed: [id number]     XR chest pa & lateral    Result Date: 6/16/2021  Impression Status post right chest tube placement with no residual pneumothorax visualized  Workstation performed: [id number]     XR chest pa & lateral    Result Date: 6/15/2021  Impression Right apical pneumothorax appearing larger compared to the prior study  The study was marked in [facility] for immediate notification   Workstation performed: [id number]     XR chest pa & lateral    Result Date: 6/9/2021  Impression Small right pneumothorax after chest tube removal  Workstation performed: [id number]      CT chest wo contrast    Result Date: 4/5/2022  Narrative CT CHEST WITHOUT IV CONTRAST INDICATION:   C34 11: Malignant neoplasm of upper lobe, right bronchus or lung   right upper lobe lung mass status post 06/07/2021 robotic right upper lobectomy for what proved to be a stage I A 2 adenocarcinoma COMPARISON:  Multiple priors most recently radiographs 8/30/2021 TECHNIQUE: CT examination of the chest was performed without intravenous contrast   Axial, sagittal, and coronal 2D reformatted images were created from the source data and submitted for interpretation  Radiation dose length product (DLP) for this visit:  266 47 mGy-cm   This examination, like all CT scans performed in the [facility], was performed utilizing techniques to minimize radiation dose exposure, including the use of iterative  reconstruction and automated exposure control  FINDINGS: LUNGS:  Right upper lobectomy  Emphysema  Small reticular opacity in the lateral right lower lobe at site of previously seen groundglass opacity, likely scarring (series 3, image 39)  0 6 cm right lower lobe pulmonary nodule appears to have been present dating back to 2013, benign (series 3, image 49)  Incidental 0 4 cm right middle lobe pulmonary nodule unchanged from multiple prior examinations (series 3, image 55)  0 6 cm nodular opacity along the left major fissure likely an intrapulmonary  lymph node present since 2013 (series 3, image 27)  PLEURA:  Unremarkable  HEART/GREAT VESSELS: Heart is unremarkable for patient's age  No thoracic aortic aneurysm  MEDIASTINUM AND SOHA:  Unremarkable  CHEST WALL AND LOWER NECK:   Unremarkable  VISUALIZED STRUCTURES IN THE UPPER ABDOMEN:  No acute abnormality or suspicious mass lesion  OSSEOUS STRUCTURES:  No acute fracture or destructive osseous lesion  Impression Small reticular opacity in the right lower lobe less apparent than on most recent CT, likely representing scarring from prior infection or inflammation in this region  Additional small pulmonary nodules throughout the lungs are unchanged  No evidence of residual or recurrent neoplasm  No abnormal lymphadenopathy  Recommend continued surveillance as indicated  Workstation performed: [id number]     CT chest wo contrast    Result Date: 5/4/2021  Narrative CT CHEST WITHOUT IV CONTRAST INDICATION:   D38 1: Neoplasm of uncertain behavior of trachea, bronchus and lung  COMPARISON:  CT scan 2/11/2021 TECHNIQUE: CT examination of the chest was performed without intravenous contrast   Axial, sagittal, and coronal 2D reformatted images were created from the source data and submitted for interpretation  Radiation dose length product (DLP) for this visit:  358 16 mGy-cm   This examination, like all CT scans performed in the [facility], was performed utilizing techniques to minimize radiation dose exposure, including the use of iterative  reconstruction and automated exposure control  FINDINGS: LUNGS:  Stable background emphysema with right upper lobe nodule measuring 1 7 x 1 3 cm image 4/20  Stable left upper lobe pleural-based nodule posteriorly measuring 7 mm on image 4/13  Stable mucoid impacted bronchus in the right middle lobe image 4/44 nothing new or suspicious  No endotracheal or endobronchial lesion  PLEURA:  Unremarkable  HEART/GREAT VESSELS:  Unremarkable for patient's age  MEDIASTINUM AND SOHA:  Unremarkable  CHEST WALL AND LOWER NECK:   Unremarkable  VISUALIZED STRUCTURES IN THE UPPER ABDOMEN:  Status post gastric bypass surgery  Status post cholecystectomy  Upper abdomen otherwise unremarkable  OSSEOUS STRUCTURES:  No acute fracture or destructive osseous lesion       Impression Stable background emphysema with right upper lobe ill-defined nodule measuring 1 7 x 1 3 cm image 4/20   Stable left upper lobe pleural-based nodule posteriorly measuring 7 mm on image 4/13  Continued follow-up recommended in one year  Workstation performed: [id number]     CT chest with contrast    Result Date: 8/10/2021  Narrative CT CHEST WITH IV CONTRAST INDICATION:   chest pain, recent right sided lung resection in june  COMPARISON:  April 29, 2021 TECHNIQUE: CT examination of the chest was performed  Axial, sagittal, and coronal 2D reformatted images were created from the source data and submitted for interpretation  Radiation dose length product (DLP) for this visit:  254 33 mGy-cm   This examination, like all CT scans performed in the [facility], was performed utilizing techniques to minimize radiation dose exposure, including the use of iterative  reconstruction and automated exposure control  IV Contrast:  85 mL of iohexol (OMNIPAQUE) FINDINGS: LUNGS:  Postsurgical changes in the right upper lobe is seen  Scattered airspace opacities within the right lower lobe are visualized  Emphysematous changes within the lungs are seen  PLEURA:  Unremarkable  HEART/GREAT VESSELS:  Unremarkable for patient's age  MEDIASTINUM AND SOHA:  Unremarkable  CHEST WALL AND LOWER NECK:   Unremarkable  VISUALIZED STRUCTURES IN THE UPPER ABDOMEN:  Postsurgical changes in the stomach are seen  Status post cholecystectomy  OSSEOUS STRUCTURES:  No acute fracture or destructive osseous lesion  Impression Postsurgical changes in the right upper lobe from lung resection  Scattered airspace opacities in the right lower lobe which may represent infection  Recommend short-term follow-up CT scan of the chest in 3 months to evaluate for resolution The study was marked in [employer] for immediate notification  Workstation performed: [id number]     No CT Chest,Abdomen,Pelvis results available for this patient     NM PET CT skull base to mid thigh    Result Date: 6/4/2021  Narrative PET/CT SCAN INDICATION:  D38 1: Neoplasm of uncertain behavior of trachea, bronchus and lung   , initial staging for treatment management MODIFIER: PI COMPARISON: CT chest 4/21/2021 and priors including PET CT 3/21/2019 CELL TYPE:  Non-small cell carcinoma, right upper biopsy 5/11/2021 TECHNIQUE:   8 0 mCi F-18-FDG administered IV  Multiplanar attenuation corrected and non attenuation corrected PET images were acquired 60 minutes post injection  Contiguous, low dose, axial CT sections were obtained from the skull base through the femurs   Intravenous contrast material was not utilized  This examination, like all CT scans performed in the [facility], was performed utilizing techniques to minimize radiation dose exposure, including the use of iterative reconstruction and automated exposure control  Fasting serum glucose: 80 mg/dl FINDINGS: VISUALIZED BRAIN:   No acute abnormalities are seen  HEAD/NECK:   There is a physiologic distribution of FDG  No FDG avid cervical adenopathy is seen  CT images: Unremarkable  CHEST:   1 9 x 1 9 cm right upper lung lesion image 3/104 demonstrates mild FDG activity, SUV 1  This has increased in size and FDG uptake since the prior PET/CT, prior measurement 9 mm, SUV 0 6  This is compatible with known low-grade malignancy  Additional small lung nodules described on prior CTs are too small for accurate PET evaluation  No additional hypermetabolic lesions  No hypermetabolic hilar or mediastinal adenopathy  CT images: Emphysema  Coronary atherosclerosis  ABDOMEN:   No FDG avid soft tissue lesions are seen  CT images: Cholecystomy  Gastric bypass  Scattered colon diverticula  PELVIS: No FDG avid soft tissue lesions are seen  CT images: Apparent bladder wall thickening may be exacerbated by under distention  OSSEOUS STRUCTURES: There is increased FDG activity in the right costovertebral junction with associated sclerosis, SUV 2 8, nonspecific but likely degenerative/inflammatory   No additional FDG avid lesions are seen  CT images: Spine degenerative change  Impression 1  1 9 x 1 9 cm right upper irregular lung lesion demonstrates mild FDG activity, compatible with known malignancy  2   No additional hypermetabolic soft tissue lesions in the thorax  No hypermetabolic adenopathy  3   There is increased FDG activity in the right costovertebral junction with associated sclerosis, nonspecific but likely degenerative/inflammatory  4   No hypermetabolic metastases in the neck, abdomen, pelvis  Workstation performed: [id number]      I personally reviewed his CT of the chest from 04/05/2022  There is normal postoperative changes on the right side  No new concerning lung lesions or lymphadenopathy  No concern for recurrent or metastatic lung cancer  There is no definite airway narrowing  There is some slight narrowing of the right middle lobe but not overly significant      Idalia Bird MD  Thoracic Surgeon    (Available by [facility] Text)

## 2022-04-06 NOTE — LETTER
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 status post 06/07/2021 robotic right upper lobectomy with some wheezing and shortness of breath    From oncology standpoint Robinson Smalls is doing well with no evidence of recurrent or metastatic disease  We will continue surveillance imaging with a repeat noncontrast CT in 6 months  I will see him back in our office afterwards  He does have some shortness of breath an audible wheezing  I suspect this is multifactorial secondary to his underlying emphysema/lung disease  I would like him to follow-up with Pulmonary Medicine to be seen and evaluated for this  Additionally I have referred him to pulmonary rehab  Hopefully his breathing should improve with medical management and pulmonary rehab    Diagnoses and all orders for this visit:    Primary adenocarcinoma of upper lobe of right lung [facility])  -     CT chest wo contrast; Future  -     Ambulatory Referral to Pulmonary Rehabilitation; Future          Thoracic History   Problem:right upper lobe adenocarcinoma     Procedure: 6/7/21  Robotic right upper lobectomy  6/15/21  Readmitted  For delayed pneumothorax     Pathology: Stage IA2 RUL adenocarcinoma pT1b 2 5 cm RUL adenocarcinoma, N0, M0           Subjective:    Patient ID: Alexia Franco III is a 46 y o  male      HPI  Robinson Smalls comes back to our office 10 months out from robotic right upper lobectomy for a stage I A 2 right upper lobe adenocarcinoma  His biggest issue at this time his wheezing and shortness of breath with exertion  This is been relatively stable since surgery  This is somewhat continuous  This limits his physical activity such as hunting  He has a mild productive cough  No hemoptysis  No fevers or chills  No unintentional weight loss  The following portions of the patient's history were reviewed and updated as appropriate: allergies, current medications, past family history, past medical history, past social history, past surgical history and problem list     Review of Systems   Constitutional: Negative for activity change, appetite change, chills, diaphoresis, fatigue, fever and unexpected weight change  HENT: Negative  Eyes: Negative  Respiratory: Positive for cough, shortness of breath and wheezing  Negative for apnea, chest tightness and stridor  Cardiovascular: Negative for chest pain, palpitations and leg swelling  Gastrointestinal: Negative for abdominal distention, abdominal pain, blood in stool, constipation, diarrhea, nausea and vomiting  Endocrine: Negative  Genitourinary: Negative  Musculoskeletal: Negative  Skin: Negative  Allergic/Immunologic: Negative  Neurological: Negative  Hematological: Negative  Psychiatric/Behavioral: Negative  Objective:   Physical Exam  Vitals and nursing note reviewed  Constitutional:       General: He is not in acute distress  Appearance: Normal appearance  He is well-developed  He is not diaphoretic  HENT:      Head: Normocephalic and atraumatic  Mouth/Throat:      Pharynx: No oropharyngeal exudate  Eyes:      General: No scleral icterus  Conjunctiva/sclera: Conjunctivae normal       Pupils: Pupils are equal, round, and reactive to light  Neck:      Thyroid: No thyromegaly  Vascular: No JVD  Trachea: No tracheal deviation  Cardiovascular:      Rate and Rhythm: Normal rate and regular rhythm  Heart sounds: Normal heart sounds  No murmur heard  No friction rub  No gallop  Pulmonary:      Effort: No respiratory distress  Breath sounds: Wheezing present  No rales  Comments: Audible wheezing from across the room  No definite stridor  Right side worse than left  Right robotic incisions are well healed  Chest:      Chest wall: No tenderness  Abdominal:      General: Bowel sounds are normal  There is no distension  Palpations: Abdomen is soft  There is no mass  Tenderness: There is no abdominal tenderness  There is no guarding or rebound  Musculoskeletal:         General: No tenderness or deformity  Normal range of motion  Cervical back: Normal range of motion and neck supple  Skin:     General: Skin is warm and dry  Coloration: Skin is not pale  Findings: No erythema or rash  Neurological:      Mental Status: He is alert and oriented to person, place, and time  Psychiatric:         Behavior: Behavior normal          Thought Content: Thought content normal          Judgment: Judgment normal      /84 (BP Location: Left arm, Patient Position: Sitting, Cuff Size: Standard)   Pulse 73   Temp 98 8 °F (37 1 °C) (Temporal)   Resp 16   Ht 6' (1 829 m)   Wt 82 5 kg (181 lb 14 1 oz)   SpO2 99%   BMI 24 67 kg/m²     XR chest pa & lateral    Result Date: 9/4/2021  Impression Mild peripheral groundglass opacity in the peripheral right upper lung, at the site of groundglass opacity on the chest CT from 8/10/2021, likely post infectious or postinflammatory scar  Workstation performed: [id number]     XR chest pa & lateral    Result Date: 6/24/2021  Impression No acute cardiopulmonary disease  Workstation performed: [id number]     XR chest pa & lateral    Result Date: 6/24/2021  Impression No acute cardiopulmonary disease  Resolved right apical pneumothorax   Workstation performed: [id number]     XR chest pa & lateral    Result Date: 6/18/2021  Impression Recurrent trace right apical pneumothorax  Workstation performed: [id number]     XR chest pa & lateral    Result Date: 6/16/2021  Impression Status post right chest tube placement with no residual pneumothorax visualized  Workstation performed: [id number]     XR chest pa & lateral    Result Date: 6/15/2021  Impression Right apical pneumothorax appearing larger compared to the prior study  The study was marked in [facility] for immediate notification  Workstation performed: [id number]     XR chest pa & lateral    Result Date: 6/9/2021  Impression Small right pneumothorax after chest tube removal  Workstation performed: [id number]      CT chest wo contrast    Result Date: 4/5/2022  Narrative CT CHEST WITHOUT IV CONTRAST INDICATION:   C34 11: Malignant neoplasm of upper lobe, right bronchus or lung   right upper lobe lung mass status post 06/07/2021 robotic right upper lobectomy for what proved to be a stage I A 2 adenocarcinoma COMPARISON:  Multiple priors most recently radiographs 8/30/2021 TECHNIQUE: CT examination of the chest was performed without intravenous contrast   Axial, sagittal, and coronal 2D reformatted images were created from the source data and submitted for interpretation  Radiation dose length product (DLP) for this visit:  266 47 mGy-cm   This examination, like all CT scans performed in the [facility], was performed utilizing techniques to minimize radiation dose exposure, including the use of iterative  reconstruction and automated exposure control  FINDINGS: LUNGS:  Right upper lobectomy  Emphysema  Small reticular opacity in the lateral right lower lobe at site of previously seen groundglass opacity, likely scarring (series 3, image 39)  0 6 cm right lower lobe pulmonary nodule appears to have been present dating back to 2013, benign (series 3, image 49)    Incidental 0 4 cm right middle lobe pulmonary nodule unchanged from multiple prior examinations (series 3, image 55)  0 6 cm nodular opacity along the left major fissure likely an intrapulmonary  lymph node present since 2013 (series 3, image 27)  PLEURA:  Unremarkable  HEART/GREAT VESSELS: Heart is unremarkable for patient's age  No thoracic aortic aneurysm  MEDIASTINUM AND SOHA:  Unremarkable  CHEST WALL AND LOWER NECK:   Unremarkable  VISUALIZED STRUCTURES IN THE UPPER ABDOMEN:  No acute abnormality or suspicious mass lesion  OSSEOUS STRUCTURES:  No acute fracture or destructive osseous lesion  Impression Small reticular opacity in the right lower lobe less apparent than on most recent CT, likely representing scarring from prior infection or inflammation in this region  Additional small pulmonary nodules throughout the lungs are unchanged  No evidence of residual or recurrent neoplasm  No abnormal lymphadenopathy  Recommend continued surveillance as indicated  Workstation performed: [id number]     CT chest wo contrast    Result Date: 5/4/2021  Narrative CT CHEST WITHOUT IV CONTRAST INDICATION:   D38 1: Neoplasm of uncertain behavior of trachea, bronchus and lung  COMPARISON:  CT scan 2/11/2021 TECHNIQUE: CT examination of the chest was performed without intravenous contrast   Axial, sagittal, and coronal 2D reformatted images were created from the source data and submitted for interpretation  Radiation dose length product (DLP) for this visit:  358 16 mGy-cm   This examination, like all CT scans performed in the [facility], was performed utilizing techniques to minimize radiation dose exposure, including the use of iterative  reconstruction and automated exposure control  FINDINGS: LUNGS:  Stable background emphysema with right upper lobe nodule measuring 1 7 x 1 3 cm image 4/20  Stable left upper lobe pleural-based nodule posteriorly measuring 7 mm on image 4/13    Stable mucoid impacted bronchus in the right middle lobe image 4/44 nothing new or suspicious  No endotracheal or endobronchial lesion  PLEURA:  Unremarkable  HEART/GREAT VESSELS:  Unremarkable for patient's age  MEDIASTINUM AND SOAH:  Unremarkable  CHEST WALL AND LOWER NECK:   Unremarkable  VISUALIZED STRUCTURES IN THE UPPER ABDOMEN:  Status post gastric bypass surgery  Status post cholecystectomy  Upper abdomen otherwise unremarkable  OSSEOUS STRUCTURES:  No acute fracture or destructive osseous lesion  Impression Stable background emphysema with right upper lobe ill-defined nodule measuring 1 7 x 1 3 cm image 4/20  Stable left upper lobe pleural-based nodule posteriorly measuring 7 mm on image 4/13  Continued follow-up recommended in one year  Workstation performed: [id number]     CT chest with contrast    Result Date: 8/10/2021  Narrative CT CHEST WITH IV CONTRAST INDICATION:   chest pain, recent right sided lung resection in june  COMPARISON:  April 29, 2021 TECHNIQUE: CT examination of the chest was performed  Axial, sagittal, and coronal 2D reformatted images were created from the source data and submitted for interpretation  Radiation dose length product (DLP) for this visit:  254 33 mGy-cm   This examination, like all CT scans performed in the [facility], was performed utilizing techniques to minimize radiation dose exposure, including the use of iterative  reconstruction and automated exposure control  IV Contrast:  85 mL of iohexol (OMNIPAQUE) FINDINGS: LUNGS:  Postsurgical changes in the right upper lobe is seen  Scattered airspace opacities within the right lower lobe are visualized  Emphysematous changes within the lungs are seen  PLEURA:  Unremarkable  HEART/GREAT VESSELS:  Unremarkable for patient's age  MEDIASTINUM AND SOHA:  Unremarkable  CHEST WALL AND LOWER NECK:   Unremarkable  VISUALIZED STRUCTURES IN THE UPPER ABDOMEN:  Postsurgical changes in the stomach are seen  Status post cholecystectomy   OSSEOUS STRUCTURES:  No acute fracture or destructive osseous lesion  Impression Postsurgical changes in the right upper lobe from lung resection  Scattered airspace opacities in the right lower lobe which may represent infection  Recommend short-term follow-up CT scan of the chest in 3 months to evaluate for resolution The study was marked in [facility] for immediate notification  Workstation performed: [id number]     No CT Chest,Abdomen,Pelvis results available for this patient  NM PET CT skull base to mid thigh    Result Date: 6/4/2021  Narrative PET/CT SCAN INDICATION:  D38 1: Neoplasm of uncertain behavior of trachea, bronchus and lung   , initial staging for treatment management MODIFIER: PI COMPARISON: CT chest 4/21/2021 and priors including PET CT 3/21/2019 CELL TYPE:  Non-small cell carcinoma, right upper biopsy 5/11/2021 TECHNIQUE:   8 0 mCi F-18-FDG administered IV  Multiplanar attenuation corrected and non attenuation corrected PET images were acquired 60 minutes post injection  Contiguous, low dose, axial CT sections were obtained from the skull base through the femurs   Intravenous contrast material was not utilized  This examination, like all CT scans performed in the [facility], was performed utilizing techniques to minimize radiation dose exposure, including the use of iterative reconstruction and automated exposure control  Fasting serum glucose: 80 mg/dl FINDINGS: VISUALIZED BRAIN:   No acute abnormalities are seen  HEAD/NECK:   There is a physiologic distribution of FDG  No FDG avid cervical adenopathy is seen  CT images: Unremarkable  CHEST:   1 9 x 1 9 cm right upper lung lesion image 3/104 demonstrates mild FDG activity, SUV 1  This has increased in size and FDG uptake since the prior PET/CT, prior measurement 9 mm, SUV 0 6  This is compatible with known low-grade malignancy  Additional small lung nodules described on prior CTs are too small for accurate PET evaluation  No additional hypermetabolic lesions   No hypermetabolic hilar or mediastinal adenopathy  CT images: Emphysema  Coronary atherosclerosis  ABDOMEN:   No FDG avid soft tissue lesions are seen  CT images: Cholecystomy  Gastric bypass  Scattered colon diverticula  PELVIS: No FDG avid soft tissue lesions are seen  CT images: Apparent bladder wall thickening may be exacerbated by under distention  OSSEOUS STRUCTURES: There is increased FDG activity in the right costovertebral junction with associated sclerosis, SUV 2 8, nonspecific but likely degenerative/inflammatory  No additional FDG avid lesions are seen  CT images: Spine degenerative change  Impression 1  1 9 x 1 9 cm right upper irregular lung lesion demonstrates mild FDG activity, compatible with known malignancy  2   No additional hypermetabolic soft tissue lesions in the thorax  No hypermetabolic adenopathy  3   There is increased FDG activity in the right costovertebral junction with associated sclerosis, nonspecific but likely degenerative/inflammatory  4   No hypermetabolic metastases in the neck, abdomen, pelvis  Workstation performed: [id number]      I personally reviewed his CT of the chest from 04/05/2022  There is normal postoperative changes on the right side  No new concerning lung lesions or lymphadenopathy  No concern for recurrent or metastatic lung cancer  There is no definite airway narrowing  There is some slight narrowing of the right middle lobe but not overly significant      Tim Jauregui MD  Thoracic Surgeon    (Available by [facility] Text)

## 2022-04-06 NOTE — TELEPHONE ENCOUNTER
----- Message from [facility], PA-C sent at 4/6/2022 10:38 AM EDT -----  Thanks for letting my know, Dr Marceil Hausen Lew Leyden, can you help arrange follow up for this gentleman in the near future if he is agreeable?  Thanks,   [employer]   ----- Message -----  From: Yarely Haynes MD  Sent: 4/6/2022   9:49 AM EDT  To: [address], PA-C

## 2022-05-24 ENCOUNTER — TELEPHONE (OUTPATIENT)
Dept: FAMILY MEDICINE CLINIC | Facility: CLINIC | Age: 52
End: 2022-05-24

## 2022-05-24 DIAGNOSIS — K21.9 GASTROESOPHAGEAL REFLUX DISEASE: ICD-10-CM

## 2022-05-24 RX ORDER — OMEPRAZOLE 40 MG/1
40 CAPSULE, DELAYED RELEASE ORAL DAILY
Qty: 90 CAPSULE | Refills: 0 | Status: SHIPPED | OUTPATIENT
Start: 2022-05-24

## 2022-06-01 ENCOUNTER — APPOINTMENT (EMERGENCY)
Dept: CT IMAGING | Facility: HOSPITAL | Age: 52
End: 2022-06-01
Payer: COMMERCIAL

## 2022-06-01 ENCOUNTER — HOSPITAL ENCOUNTER (EMERGENCY)
Facility: HOSPITAL | Age: 52
Discharge: HOME | End: 2022-06-01
Attending: EMERGENCY MEDICINE
Payer: COMMERCIAL

## 2022-06-01 VITALS
WEIGHT: 180 LBS | TEMPERATURE: 98.1 F | SYSTOLIC BLOOD PRESSURE: 147 MMHG | HEART RATE: 74 BPM | DIASTOLIC BLOOD PRESSURE: 86 MMHG | RESPIRATION RATE: 16 BRPM | OXYGEN SATURATION: 98 % | HEIGHT: 72 IN | BODY MASS INDEX: 24.38 KG/M2

## 2022-06-01 DIAGNOSIS — R04.2 HEMOPTYSIS: Primary | ICD-10-CM

## 2022-06-01 LAB
ALBUMIN SERPL BCP-MCNC: 4 G/DL (ref 3.5–5)
ALP SERPL-CCNC: 97 U/L (ref 46–116)
ALT SERPL W P-5'-P-CCNC: 25 U/L (ref 12–78)
ANION GAP SERPL CALCULATED.3IONS-SCNC: 10 MMOL/L (ref 4–13)
APTT PPP: 27 SECONDS (ref 23–37)
AST SERPL W P-5'-P-CCNC: 19 U/L (ref 5–45)
BASOPHILS # BLD AUTO: 0.08 THOUSANDS/ΜL (ref 0–0.1)
BASOPHILS NFR BLD AUTO: 1 % (ref 0–1)
BILIRUB SERPL-MCNC: 0.45 MG/DL (ref 0.2–1)
BUN SERPL-MCNC: 17 MG/DL (ref 5–25)
CALCIUM SERPL-MCNC: 9.2 MG/DL (ref 8.3–10.1)
CHLORIDE SERPL-SCNC: 103 MMOL/L (ref 100–108)
CO2 SERPL-SCNC: 25 MMOL/L (ref 21–32)
CREAT SERPL-MCNC: 0.98 MG/DL (ref 0.6–1.3)
EOSINOPHIL # BLD AUTO: 0.13 THOUSAND/ΜL (ref 0–0.61)
EOSINOPHIL NFR BLD AUTO: 2 % (ref 0–6)
ERYTHROCYTE [DISTWIDTH] IN BLOOD BY AUTOMATED COUNT: 13.1 % (ref 11.6–15.1)
GFR SERPL CREATININE-BSD FRML MDRD: 87 ML/MIN/1.73SQ M
GLUCOSE SERPL-MCNC: 180 MG/DL (ref 65–140)
HCT VFR BLD AUTO: 46.1 % (ref 36.5–49.3)
HGB BLD-MCNC: 15.5 G/DL (ref 12–17)
IMM GRANULOCYTES # BLD AUTO: 0.02 THOUSAND/UL (ref 0–0.2)
IMM GRANULOCYTES NFR BLD AUTO: 0 % (ref 0–2)
INR PPP: 1.01 (ref 0.84–1.19)
LYMPHOCYTES # BLD AUTO: 1.44 THOUSANDS/ΜL (ref 0.6–4.47)
LYMPHOCYTES NFR BLD AUTO: 21 % (ref 14–44)
MCH RBC QN AUTO: 30.7 PG (ref 26.8–34.3)
MCHC RBC AUTO-ENTMCNC: 33.6 G/DL (ref 31.4–37.4)
MCV RBC AUTO: 91 FL (ref 82–98)
MONOCYTES # BLD AUTO: 0.4 THOUSAND/ΜL (ref 0.17–1.22)
MONOCYTES NFR BLD AUTO: 6 % (ref 4–12)
NEUTROPHILS # BLD AUTO: 4.83 THOUSANDS/ΜL (ref 1.85–7.62)
NEUTS SEG NFR BLD AUTO: 70 % (ref 43–75)
NRBC BLD AUTO-RTO: 0 /100 WBCS
PLATELET # BLD AUTO: 343 THOUSANDS/UL (ref 149–390)
PMV BLD AUTO: 8.4 FL (ref 8.9–12.7)
POTASSIUM SERPL-SCNC: 3.8 MMOL/L (ref 3.5–5.3)
PROT SERPL-MCNC: 7.4 G/DL (ref 6.4–8.2)
PROTHROMBIN TIME: 12.8 SECONDS (ref 11.6–14.5)
RBC # BLD AUTO: 5.05 MILLION/UL (ref 3.88–5.62)
SODIUM SERPL-SCNC: 138 MMOL/L (ref 136–145)
WBC # BLD AUTO: 6.9 THOUSAND/UL (ref 4.31–10.16)

## 2022-06-01 PROCEDURE — 36415 COLL VENOUS BLD VENIPUNCTURE: CPT | Performed by: EMERGENCY MEDICINE

## 2022-06-01 PROCEDURE — 71250 CT THORAX DX C-: CPT

## 2022-06-01 PROCEDURE — 85610 PROTHROMBIN TIME: CPT | Performed by: EMERGENCY MEDICINE

## 2022-06-01 PROCEDURE — 85025 COMPLETE CBC W/AUTO DIFF WBC: CPT | Performed by: EMERGENCY MEDICINE

## 2022-06-01 PROCEDURE — 99284 EMERGENCY DEPT VISIT MOD MDM: CPT | Performed by: EMERGENCY MEDICINE

## 2022-06-01 PROCEDURE — 85730 THROMBOPLASTIN TIME PARTIAL: CPT | Performed by: EMERGENCY MEDICINE

## 2022-06-01 PROCEDURE — 70490 CT SOFT TISSUE NECK W/O DYE: CPT

## 2022-06-01 PROCEDURE — 80053 COMPREHEN METABOLIC PANEL: CPT | Performed by: EMERGENCY MEDICINE

## 2022-06-01 PROCEDURE — 99284 EMERGENCY DEPT VISIT MOD MDM: CPT

## 2022-06-01 NOTE — ED NOTES
Patient given DC instructions and left the ER without any further complaints     Zoe Wagner RN  06/01/22 [id number]
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
## 2022-06-19 ENCOUNTER — APPOINTMENT (EMERGENCY)
Dept: CT IMAGING | Facility: HOSPITAL | Age: 52
End: 2022-06-19
Payer: COMMERCIAL

## 2022-06-19 ENCOUNTER — HOSPITAL ENCOUNTER (EMERGENCY)
Facility: HOSPITAL | Age: 52
Discharge: HOME | End: 2022-06-19
Attending: EMERGENCY MEDICINE
Payer: COMMERCIAL

## 2022-06-19 VITALS
HEART RATE: 69 BPM | RESPIRATION RATE: 18 BRPM | SYSTOLIC BLOOD PRESSURE: 177 MMHG | DIASTOLIC BLOOD PRESSURE: 90 MMHG | OXYGEN SATURATION: 98 %

## 2022-06-19 DIAGNOSIS — R10.9 ABDOMINAL PAIN: Primary | ICD-10-CM

## 2022-06-19 LAB
ALBUMIN SERPL BCP-MCNC: 4.4 G/DL (ref 3.5–5)
ALP SERPL-CCNC: 99 U/L (ref 46–116)
ALT SERPL W P-5'-P-CCNC: 36 U/L (ref 12–78)
ANION GAP SERPL CALCULATED.3IONS-SCNC: 9 MMOL/L (ref 4–13)
APTT PPP: 27 SECONDS (ref 23–37)
AST SERPL W P-5'-P-CCNC: 25 U/L (ref 5–45)
BASOPHILS # BLD AUTO: 0.05 THOUSANDS/ΜL (ref 0–0.1)
BASOPHILS NFR BLD AUTO: 1 % (ref 0–1)
BILIRUB DIRECT SERPL-MCNC: 0.17 MG/DL (ref 0–0.2)
BILIRUB SERPL-MCNC: 0.84 MG/DL (ref 0.2–1)
BILIRUB UR QL STRIP: NEGATIVE
BUN SERPL-MCNC: 10 MG/DL (ref 5–25)
CALCIUM SERPL-MCNC: 9.4 MG/DL (ref 8.3–10.1)
CHLORIDE SERPL-SCNC: 101 MMOL/L (ref 100–108)
CLARITY UR: CLEAR
CO2 SERPL-SCNC: 28 MMOL/L (ref 21–32)
COLOR UR: YELLOW
CREAT SERPL-MCNC: 0.89 MG/DL (ref 0.6–1.3)
D DIMER PPP FEU-MCNC: 0.28 UG/ML FEU
EOSINOPHIL # BLD AUTO: 0.15 THOUSAND/ΜL (ref 0–0.61)
EOSINOPHIL NFR BLD AUTO: 3 % (ref 0–6)
ERYTHROCYTE [DISTWIDTH] IN BLOOD BY AUTOMATED COUNT: 12.6 % (ref 11.6–15.1)
GFR SERPL CREATININE-BSD FRML MDRD: 97 ML/MIN/1.73SQ M
GLUCOSE SERPL-MCNC: 87 MG/DL (ref 65–140)
GLUCOSE UR STRIP-MCNC: NEGATIVE MG/DL
HCT VFR BLD AUTO: 43.5 % (ref 36.5–49.3)
HGB BLD-MCNC: 15.7 G/DL (ref 12–17)
HGB UR QL STRIP.AUTO: NEGATIVE
IMM GRANULOCYTES # BLD AUTO: 0.02 THOUSAND/UL (ref 0–0.2)
IMM GRANULOCYTES NFR BLD AUTO: 0 % (ref 0–2)
INR PPP: 0.96 (ref 0.84–1.19)
KETONES UR STRIP-MCNC: NEGATIVE MG/DL
LACTATE SERPL-SCNC: 1.1 MMOL/L (ref 0.5–2)
LEUKOCYTE ESTERASE UR QL STRIP: NEGATIVE
LIPASE SERPL-CCNC: 99 U/L (ref 73–393)
LYMPHOCYTES # BLD AUTO: 1.12 THOUSANDS/ΜL (ref 0.6–4.47)
LYMPHOCYTES NFR BLD AUTO: 19 % (ref 14–44)
MAGNESIUM SERPL-MCNC: 2.3 MG/DL (ref 1.6–2.6)
MCH RBC QN AUTO: 31.7 PG (ref 26.8–34.3)
MCHC RBC AUTO-ENTMCNC: 36.1 G/DL (ref 31.4–37.4)
MCV RBC AUTO: 88 FL (ref 82–98)
MONOCYTES # BLD AUTO: 0.36 THOUSAND/ΜL (ref 0.17–1.22)
MONOCYTES NFR BLD AUTO: 6 % (ref 4–12)
NEUTROPHILS # BLD AUTO: 4.24 THOUSANDS/ΜL (ref 1.85–7.62)
NEUTS SEG NFR BLD AUTO: 71 % (ref 43–75)
NITRITE UR QL STRIP: NEGATIVE
NRBC BLD AUTO-RTO: 0 /100 WBCS
PH UR STRIP.AUTO: 6.5 [PH]
PLATELET # BLD AUTO: 318 THOUSANDS/UL (ref 149–390)
PMV BLD AUTO: 9.1 FL (ref 8.9–12.7)
POTASSIUM SERPL-SCNC: 4 MMOL/L (ref 3.5–5.3)
PROT SERPL-MCNC: 7.9 G/DL (ref 6.4–8.2)
PROT UR STRIP-MCNC: NEGATIVE MG/DL
PROTHROMBIN TIME: 12.3 SECONDS (ref 11.6–14.5)
RBC # BLD AUTO: 4.96 MILLION/UL (ref 3.88–5.62)
SODIUM SERPL-SCNC: 138 MMOL/L (ref 136–145)
SP GR UR STRIP.AUTO: <=1.005 (ref 1–1.03)
UROBILINOGEN UR QL STRIP.AUTO: 0.2 E.U./DL
WBC # BLD AUTO: 5.94 THOUSAND/UL (ref 4.31–10.16)

## 2022-06-19 PROCEDURE — 36415 COLL VENOUS BLD VENIPUNCTURE: CPT | Performed by: EMERGENCY MEDICINE

## 2022-06-19 PROCEDURE — 83735 ASSAY OF MAGNESIUM: CPT | Performed by: EMERGENCY MEDICINE

## 2022-06-19 PROCEDURE — 85610 PROTHROMBIN TIME: CPT | Performed by: EMERGENCY MEDICINE

## 2022-06-19 PROCEDURE — 83605 ASSAY OF LACTIC ACID: CPT | Performed by: EMERGENCY MEDICINE

## 2022-06-19 PROCEDURE — 96375 TX/PRO/DX INJ NEW DRUG ADDON: CPT

## 2022-06-19 PROCEDURE — G1004 CDSM NDSC: HCPCS

## 2022-06-19 PROCEDURE — 81003 URINALYSIS AUTO W/O SCOPE: CPT | Performed by: EMERGENCY MEDICINE

## 2022-06-19 PROCEDURE — 71250 CT THORAX DX C-: CPT

## 2022-06-19 PROCEDURE — 80076 HEPATIC FUNCTION PANEL: CPT | Performed by: EMERGENCY MEDICINE

## 2022-06-19 PROCEDURE — 74176 CT ABD & PELVIS W/O CONTRAST: CPT

## 2022-06-19 PROCEDURE — 96374 THER/PROPH/DIAG INJ IV PUSH: CPT

## 2022-06-19 PROCEDURE — 93005 ELECTROCARDIOGRAM TRACING: CPT

## 2022-06-19 PROCEDURE — 85025 COMPLETE CBC W/AUTO DIFF WBC: CPT | Performed by: EMERGENCY MEDICINE

## 2022-06-19 PROCEDURE — 80048 BASIC METABOLIC PNL TOTAL CA: CPT | Performed by: EMERGENCY MEDICINE

## 2022-06-19 PROCEDURE — 83690 ASSAY OF LIPASE: CPT | Performed by: EMERGENCY MEDICINE

## 2022-06-19 PROCEDURE — 85379 FIBRIN DEGRADATION QUANT: CPT | Performed by: EMERGENCY MEDICINE

## 2022-06-19 PROCEDURE — 85730 THROMBOPLASTIN TIME PARTIAL: CPT | Performed by: EMERGENCY MEDICINE

## 2022-06-19 PROCEDURE — 99285 EMERGENCY DEPT VISIT HI MDM: CPT | Performed by: EMERGENCY MEDICINE

## 2022-06-19 PROCEDURE — C9113 INJ PANTOPRAZOLE SODIUM, VIA: HCPCS | Performed by: EMERGENCY MEDICINE

## 2022-06-19 PROCEDURE — 99284 EMERGENCY DEPT VISIT MOD MDM: CPT

## 2022-06-19 RX ORDER — HYDROMORPHONE HCL/PF 1 MG/ML
0.5 SYRINGE (ML) INJECTION ONCE
Status: COMPLETED | OUTPATIENT
Start: 2022-06-19 | End: 2022-06-19

## 2022-06-19 RX ORDER — PANTOPRAZOLE SODIUM 40 MG/1
40 INJECTION, POWDER, FOR SOLUTION INTRAVENOUS ONCE
Status: COMPLETED | OUTPATIENT
Start: 2022-06-19 | End: 2022-06-19

## 2022-06-19 RX ORDER — LIDOCAINE 50 MG/G
1 PATCH TOPICAL ONCE
Status: DISCONTINUED | OUTPATIENT
Start: 2022-06-19 | End: 2022-06-19 | Stop reason: HOSPADM

## 2022-06-19 RX ORDER — FAMOTIDINE 20 MG/1
20 TABLET, FILM COATED ORAL 2 TIMES DAILY
Qty: 30 TABLET | Refills: 0 | Status: SHIPPED | OUTPATIENT
Start: 2022-06-19 | End: 2022-07-01 | Stop reason: SDUPTHER

## 2022-06-19 RX ORDER — SUCRALFATE ORAL 1 G/10ML
1 SUSPENSION ORAL
Qty: 414 ML | Refills: 0 | Status: SHIPPED | OUTPATIENT
Start: 2022-06-19 | End: 2022-07-01 | Stop reason: SDUPTHER

## 2022-06-19 RX ADMIN — HYDROMORPHONE HYDROCHLORIDE 0.5 MG: 1 INJECTION, SOLUTION INTRAMUSCULAR; INTRAVENOUS; SUBCUTANEOUS at 13:04

## 2022-06-19 RX ADMIN — LIDOCAINE 5% 1 PATCH: 700 PATCH TOPICAL at 13:05

## 2022-06-19 RX ADMIN — PANTOPRAZOLE SODIUM 40 MG: 40 INJECTION, POWDER, FOR SOLUTION INTRAVENOUS at 14:57
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
## 2022-06-20 LAB
ATRIAL RATE: 65 BPM
P AXIS: 86 DEGREES
PR INTERVAL: 146 MS
QRS AXIS: -45 DEGREES
QRSD INTERVAL: 144 MS
QT INTERVAL: 432 MS
QTC INTERVAL: 449 MS
T WAVE AXIS: 52 DEGREES
VENTRICULAR RATE: 65 BPM

## 2022-06-20 PROCEDURE — 93010 ELECTROCARDIOGRAM REPORT: CPT | Performed by: INTERNAL MEDICINE

## 2022-07-01 ENCOUNTER — OFFICE VISIT (OUTPATIENT)
Dept: BARIATRICS | Facility: CLINIC | Age: 52
End: 2022-07-01
Payer: COMMERCIAL

## 2022-07-01 VITALS
HEART RATE: 72 BPM | BODY MASS INDEX: 23.91 KG/M2 | SYSTOLIC BLOOD PRESSURE: 130 MMHG | DIASTOLIC BLOOD PRESSURE: 82 MMHG | WEIGHT: 176.5 LBS | TEMPERATURE: 98.3 F | HEIGHT: 72 IN

## 2022-07-01 DIAGNOSIS — C34.11 PRIMARY ADENOCARCINOMA OF UPPER LOBE OF RIGHT LUNG (HCC): ICD-10-CM

## 2022-07-01 DIAGNOSIS — K91.2 POSTSURGICAL MALABSORPTION: ICD-10-CM

## 2022-07-01 DIAGNOSIS — Z48.815 ENCOUNTER FOR SURGICAL AFTERCARE FOLLOWING SURGERY OF DIGESTIVE SYSTEM: Primary | ICD-10-CM

## 2022-07-01 DIAGNOSIS — R10.9 ABDOMINAL PAIN: ICD-10-CM

## 2022-07-01 DIAGNOSIS — I10 PRIMARY HYPERTENSION: ICD-10-CM

## 2022-07-01 DIAGNOSIS — M45.2 ANKYLOSING SPONDYLITIS OF CERVICAL REGION (HCC): ICD-10-CM

## 2022-07-01 DIAGNOSIS — Z98.84 BARIATRIC SURGERY STATUS: ICD-10-CM

## 2022-07-01 PROCEDURE — 99214 OFFICE O/P EST MOD 30 MIN: CPT | Performed by: PHYSICIAN ASSISTANT

## 2022-07-01 RX ORDER — SUCRALFATE ORAL 1 G/10ML
1 SUSPENSION ORAL
Qty: 1200 ML | Refills: 0 | Status: SHIPPED | OUTPATIENT
Start: 2022-07-01 | End: 2022-10-21 | Stop reason: ALTCHOICE

## 2022-07-01 RX ORDER — FAMOTIDINE 20 MG/1
20 TABLET, FILM COATED ORAL 2 TIMES DAILY
Qty: 30 TABLET | Refills: 1 | Status: SHIPPED | OUTPATIENT
Start: 2022-07-01

## 2022-07-01 NOTE — PROGRESS NOTES
Assessment/Plan:     Patient ID: Kraig Bumpers III is a 46 y o  male  Bariatric Surgery Status    -s/p Piper-En-Y Gastric Bypass with Dr Araseli Eldridge 8/29/2011  Presents to the office today for OD annual has been lost to follow since 2011  Overall fair/poor however from weight loss point of view he has done great , however states the past few months has gained 15-20 lbs in a  Short amount of time  Patient has history of adenocarcinoma, he underwent lung resection about a year ago  Patient reports surgery went well  A few days ago he gumaro to the ER for chest and abd pain  He states  Pain in chest wall feels like a tingling sensation, he has experiences sharp pains in his right abdomen  He denies dyspnea or left-sided chest wall pain  Denies fevers or chills, nausea or vomiting, changes to his appetite      CT chest/abd/pelvis 6/2020    IMPRESSION:  1  No acute abnormality identified in the chest, abdomen, or pelvis  2   Status post right upper lobectomy with stable appearance the chest   3   Stable pulmonary nodules measuring up to 6 mm as detailed above  Based on current Fleischner Society 2017 Guidelines on incidental pulmonary nodule, patients with a known malignancy are at increased risk of metastasis and should receive followup CT   at intervals appropriate for the type of cancer and its risk of pulmonary metastases  4   Status post Piper-en-Y gastric bypass with gas and fluid again noted in the gastric remnant 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  Nonemergent bariatric surgery consultation is advised  5   Diverticulosis without evidence for acute diverticulitis  6   Additional findings as noted  Bariatrics consulted  Pt was d/c home with Pepcid and Carafate in addition to his omeprazole, as well as plan to follow up bariatrics and get EGD  Will schedule for UGI and EGD, pending UGI will consult with Dr Araseli Eldridge if plan needs to change   Will continue medications as prescribed  Discussed with patient his "burning and tingling" chest pain may also be related to radiation pain from his ankylosing spondylitis  He already has a PCP visit scheduled as well to discuss his recent EKG findings during ER visit  Primary R adenocarcinoma   - s/o R lobectomy, follows with oncology and just referred to pulmonology   - surg onc planning on repeating his CT scan in October     HTN   - controlled on norvasc    Ankylosing Spondylitis   - was previously seen by pain med but stopped as he didn't like Grabiel  advsied he talk to his PCP about other options to control his chronic pain     · Continued/Maintain healthy weight loss with good nutrition intakes  · Adequate hydration with at least 64oz  fluid intake  · Follow diet as discussed  · Follow vitamin and mineral recommendations as reviewed with you  · Exercise as tolerated  · Colonoscopy referral made: utd    · Follow-up as scheduled  We kindly ask that your arrive 15 minutes before your scheduled appointment time with your provider to allow our staff to room you, get your vital signs and update your chart  · Get lab work done  Please call the office if you need a script  It is recommended to check with your insurance BEFORE getting labs done to make sure they are covered by your policy  · Call our office if you have any problems with abdominal pain especially associated with fever, chills, nausea, vomiting or any other concerns  · All  Post-bariatric surgery patients should be aware that very small quantities of any alcohol can cause impairment and it is very possible not to feel the effect  The effect can be in the system for several hours  It is also a stomach irritant  · It is advised to AVOID alcohol, Nonsteroidal antiinflammatory drugs (NSAIDS) and nicotine of all forms   Any of these can cause stomach irritation/pain  · Discussed the effects of alcohol on a bariatric patient and the increased impairment risk  · Keep up the good work! Postsurgical Malabsorption   -At risk for malabsorption of vitamins/minerals secondary to malabsorption and restriction of intake from bariatric surgery  -Currently taking adequate postop bariatric surgery vitamin supplementation  -Next set of bariatric labs ordered for approximately 2 weeks  -Patient received education about the importance of adhering to a lifelong supplementation regimen to avoid vitamin/mineral deficiencies      Diagnoses and all orders for this visit:    Encounter for surgical aftercare following surgery of digestive system  -     FL UPPER GI UGI; Future  -     Zinc; Future  -     Vitamin D 25 hydroxy; Future  -     Vitamin B12; Future  -     Vitamin B1, whole blood; Future  -     PTH, intact; Future  -     Vitamin A; Future  -     CBC and Platelet; Future  -     Comprehensive metabolic panel; Future  -     Ferritin; Future  -     Folate; Future  -     Iron Saturation %; Future    Bariatric surgery status  -     FL UPPER GI UGI; Future  -     Zinc; Future  -     Vitamin D 25 hydroxy; Future  -     Vitamin B12; Future  -     Vitamin B1, whole blood; Future  -     PTH, intact; Future  -     Vitamin A; Future  -     CBC and Platelet; Future  -     Comprehensive metabolic panel; Future  -     Ferritin; Future  -     Folate; Future  -     Iron Saturation %; Future    Postsurgical malabsorption  -     FL UPPER GI UGI; Future  -     Zinc; Future  -     Vitamin D 25 hydroxy; Future  -     Vitamin B12; Future  -     Vitamin B1, whole blood; Future  -     PTH, intact; Future  -     Vitamin A; Future  -     CBC and Platelet; Future  -     Comprehensive metabolic panel; Future  -     Ferritin; Future  -     Folate; Future  -     Iron Saturation %; Future    BMI 23 0-23 9, adult  -     Zinc; Future  -     Vitamin D 25 hydroxy; Future  -     Vitamin B12; Future  -     Vitamin B1, whole blood; Future  -     PTH, intact;  Future  -     Vitamin A; Future  -     CBC and Platelet; Future  -     Comprehensive metabolic panel; Future  -     Ferritin; Future  -     Folate; Future  -     Iron Saturation %; Future    Ankylosing spondylitis of cervical region ([facility] [address] )  -     Zinc; Future  -     Vitamin D 25 hydroxy; Future  -     Vitamin B12; Future  -     Vitamin B1, whole blood; Future  -     PTH, intact; Future  -     Vitamin A; Future  -     CBC and Platelet; Future  -     Comprehensive metabolic panel; Future  -     Ferritin; Future  -     Folate; Future  -     Iron Saturation %; Future    Primary adenocarcinoma of upper lobe of right lung (HCC)  -     Zinc; Future  -     Vitamin D 25 hydroxy; Future  -     Vitamin B12; Future  -     Vitamin B1, whole blood; Future  -     PTH, intact; Future  -     Vitamin A; Future  -     CBC and Platelet; Future  -     Comprehensive metabolic panel; Future  -     Ferritin; Future  -     Folate; Future  -     Iron Saturation %; Future    Primary hypertension  -     Zinc; Future  -     Vitamin D 25 hydroxy; Future  -     Vitamin B12; Future  -     Vitamin B1, whole blood; Future  -     PTH, intact; Future  -     Vitamin A; Future  -     CBC and Platelet; Future  -     Comprehensive metabolic panel; Future  -     Ferritin; Future  -     Folate; Future  -     Iron Saturation %; Future    Abdominal pain  -     sucralfate (CARAFATE) 1 g/10 mL suspension; Take 10 mL (1 g total) by mouth 4 (four) times a day (with meals and at bedtime)  -     famotidine (PEPCID) 20 mg tablet; Take 1 tablet (20 mg total) by mouth 2 (two) times a day  -     FL UPPER GI UGI; Future  -     Zinc; Future  -     Vitamin D 25 hydroxy; Future  -     Vitamin B12; Future  -     Vitamin B1, whole blood; Future  -     PTH, intact; Future  -     Vitamin A; Future  -     CBC and Platelet; Future  -     Comprehensive metabolic panel; Future  -     Ferritin; Future  -     Folate; Future  -     Iron Saturation %; Future         Subjective:      Patient ID: Irma Anaya is a 46 y o  male      -s/p Piper-En-Y Gastric Bypass with Dr Lakisha Veras 8/29/2011  Presents to the office today for OD annual has been lost to follow since 2011    Initial:260  Current:176  EWL: (Weight loss is ahead of schedule at this post surgical period )  Current BMI is Body mass index is 23 94 kg/m²  · Tolerating a regular diet-yes  · Eating at least 60 grams of protein per day-yes  · Drinking at least 64 ounces of fluid per day-yes  · Using nicotine-no  · Using alcohol-occasional beer  · Supplements: MVI mens    · EWL is 110%, which places the patient ahead of schedule for expected post surgical weight loss at this time  The following portions of the patient's history were reviewed and updated as appropriate: allergies, current medications, past family history, past medical history, past social history, past surgical history and problem list     Review of Systems   Constitutional: Negative  Respiratory: Negative  Cardiovascular: Positive for chest pain  Gastrointestinal: Positive for abdominal pain and nausea  Negative for constipation, diarrhea and vomiting  Musculoskeletal: Positive for back pain (chronic )  Neurological: Negative  Psychiatric/Behavioral: Negative            Objective:    /82   Pulse 72   Temp 98 3 °F (36 8 °C) (Tympanic)   Ht 6' (1 829 m)   Wt 80 1 kg (176 lb 8 oz)   BMI 23 94 kg/m²      Physical Exam

## 2022-07-01 NOTE — PATIENT INSTRUCTIONS
Follow-up as scheduled  We kindly ask that your arrive 15 minutes before your scheduled appointment time with your provider to allow our staff to room you, get your vital signs and update your chart  Get lab work done  Please call the office if you need a script  It is recommended to check with your insurance BEFORE getting labs done to make sure they are covered by your policy  Call our office if you have any problems with abdominal pain especially associated with fever, chills, nausea, vomiting or any other concerns  All  Post-bariatric surgery patients should be aware that very small quantities of any alcohol can cause impairment and it is very possible not to feel the effect  The effect can be in the system for several hours  It is also a stomach irritant  It is advised to AVOID alcohol, Nonsteroidal antiinflammatory drugs (NSAIDS) and nicotine of all forms   Any of these can cause stomach irritation/pain  Discussed the effects of alcohol on a bariatric patient and the increased impairment risk  Keep up the good work!

## 2022-07-06 ENCOUNTER — RA CDI HCC (OUTPATIENT)
Dept: OTHER | Facility: HOSPITAL | Age: 52
End: 2022-07-06

## 2022-07-06 ENCOUNTER — TELEPHONE (OUTPATIENT)
Dept: FAMILY MEDICINE CLINIC | Facility: CLINIC | Age: 52
End: 2022-07-06

## 2022-07-06 NOTE — TELEPHONE ENCOUNTER
----- Message from Mehreen Lewis DO sent at 7/6/2022  9:58 AM EDT -----  Regarding: FW: Sooner Appt  Ceci,  Can you call [facility] - not sure if there is anything Friday  If need be can use 3Pm on Friday as add on visit     Katerine Cedeno  ----- Message -----  From: Peter Aguirre  Sent: 7/6/2022   9:28 AM EDT  To: Mehreen Lewis DO  Subject: FW: Sooner Appt                                    ----- Message -----  From: Jose Leahy III  Sent: 7/6/2022   9:20 AM EDT  To: [facility]  Subject: Sooner Appt                                      Is there anyway I can be seen this week? Im having tremendous pain and I dont feel well  I cant sleep from it

## 2022-07-06 NOTE — PROGRESS NOTES
[facility]  coding opportunities       Chart reviewed, no opportunity found:   [address]        Patients Insurance     Medicare Insurance: [employer]

## 2022-07-12 ENCOUNTER — APPOINTMENT (OUTPATIENT)
Dept: LAB | Facility: MEDICAL CENTER | Age: 52
End: 2022-07-12
Payer: COMMERCIAL

## 2022-07-12 ENCOUNTER — OFFICE VISIT (OUTPATIENT)
Dept: FAMILY MEDICINE CLINIC | Facility: CLINIC | Age: 52
End: 2022-07-12
Payer: COMMERCIAL

## 2022-07-12 ENCOUNTER — HOSPITAL ENCOUNTER (OUTPATIENT)
Dept: RADIOLOGY | Facility: HOSPITAL | Age: 52
Discharge: HOME | End: 2022-07-12
Payer: COMMERCIAL

## 2022-07-12 VITALS
BODY MASS INDEX: 23.92 KG/M2 | HEART RATE: 76 BPM | TEMPERATURE: 97.6 F | WEIGHT: 176.6 LBS | HEIGHT: 72 IN | SYSTOLIC BLOOD PRESSURE: 124 MMHG | DIASTOLIC BLOOD PRESSURE: 78 MMHG | RESPIRATION RATE: 18 BRPM

## 2022-07-12 DIAGNOSIS — K91.2 POSTSURGICAL MALABSORPTION: ICD-10-CM

## 2022-07-12 DIAGNOSIS — R10.9 ABDOMINAL PAIN: ICD-10-CM

## 2022-07-12 DIAGNOSIS — R94.31 ABNORMAL EKG: ICD-10-CM

## 2022-07-12 DIAGNOSIS — R10.11 RIGHT UPPER QUADRANT ABDOMINAL PAIN: ICD-10-CM

## 2022-07-12 DIAGNOSIS — Z48.815 ENCOUNTER FOR SURGICAL AFTERCARE FOLLOWING SURGERY OF DIGESTIVE SYSTEM: ICD-10-CM

## 2022-07-12 DIAGNOSIS — Z98.84 BARIATRIC SURGERY STATUS: ICD-10-CM

## 2022-07-12 DIAGNOSIS — K57.92 DIVERTICULITIS: Primary | ICD-10-CM

## 2022-07-12 LAB
BASOPHILS # BLD AUTO: 0.08 THOUSANDS/ΜL (ref 0–0.1)
BASOPHILS NFR BLD AUTO: 1 % (ref 0–1)
CRP SERPL QL: <3 MG/L
EOSINOPHIL # BLD AUTO: 0.09 THOUSAND/ΜL (ref 0–0.61)
EOSINOPHIL NFR BLD AUTO: 1 % (ref 0–6)
ERYTHROCYTE [DISTWIDTH] IN BLOOD BY AUTOMATED COUNT: 14.2 % (ref 11.6–15.1)
ERYTHROCYTE [SEDIMENTATION RATE] IN BLOOD: 11 MM/HOUR (ref 0–19)
HCT VFR BLD AUTO: 47.3 % (ref 36.5–49.3)
HGB BLD-MCNC: 15.6 G/DL (ref 12–17)
IMM GRANULOCYTES # BLD AUTO: 0.01 THOUSAND/UL (ref 0–0.2)
IMM GRANULOCYTES NFR BLD AUTO: 0 % (ref 0–2)
LYMPHOCYTES # BLD AUTO: 1.09 THOUSANDS/ΜL (ref 0.6–4.47)
LYMPHOCYTES NFR BLD AUTO: 17 % (ref 14–44)
MCH RBC QN AUTO: 31 PG (ref 26.8–34.3)
MCHC RBC AUTO-ENTMCNC: 33 G/DL (ref 31.4–37.4)
MCV RBC AUTO: 94 FL (ref 82–98)
MONOCYTES # BLD AUTO: 0.6 THOUSAND/ΜL (ref 0.17–1.22)
MONOCYTES NFR BLD AUTO: 10 % (ref 4–12)
NEUTROPHILS # BLD AUTO: 4.46 THOUSANDS/ΜL (ref 1.85–7.62)
NEUTS SEG NFR BLD AUTO: 71 % (ref 43–75)
NRBC BLD AUTO-RTO: 0 /100 WBCS
PLATELET # BLD AUTO: 327 THOUSANDS/UL (ref 149–390)
PMV BLD AUTO: 9.1 FL (ref 8.9–12.7)
RBC # BLD AUTO: 5.04 MILLION/UL (ref 3.88–5.62)
WBC # BLD AUTO: 6.33 THOUSAND/UL (ref 4.31–10.16)

## 2022-07-12 PROCEDURE — 3725F SCREEN DEPRESSION PERFORMED: CPT | Performed by: INTERNAL MEDICINE

## 2022-07-12 PROCEDURE — 74240 X-RAY XM UPR GI TRC 1CNTRST: CPT

## 2022-07-12 PROCEDURE — 86140 C-REACTIVE PROTEIN: CPT

## 2022-07-12 PROCEDURE — 85025 COMPLETE CBC W/AUTO DIFF WBC: CPT

## 2022-07-12 PROCEDURE — 99214 OFFICE O/P EST MOD 30 MIN: CPT | Performed by: INTERNAL MEDICINE

## 2022-07-12 PROCEDURE — 85652 RBC SED RATE AUTOMATED: CPT

## 2022-07-12 PROCEDURE — 36415 COLL VENOUS BLD VENIPUNCTURE: CPT

## 2022-07-12 RX ORDER — LEVOFLOXACIN 500 MG/1
500 TABLET, FILM COATED ORAL EVERY 24 HOURS
Qty: 7 TABLET | Refills: 0 | Status: SHIPPED | OUTPATIENT
Start: 2022-07-12 | End: 2022-07-19

## 2022-07-12 RX ORDER — GABAPENTIN 300 MG/1
300 CAPSULE ORAL 2 TIMES DAILY
Qty: 60 CAPSULE | Refills: 0 | Status: SHIPPED | OUTPATIENT
Start: 2022-07-12 | End: 2022-10-06 | Stop reason: ALTCHOICE
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
## 2022-07-27 DIAGNOSIS — F51.01 PRIMARY INSOMNIA: ICD-10-CM

## 2022-07-27 RX ORDER — TRAZODONE HYDROCHLORIDE 50 MG/1
100 TABLET ORAL
Qty: 90 TABLET | Refills: 3 | Status: SHIPPED | OUTPATIENT
Start: 2022-07-27 | End: 2022-10-25

## 2022-07-27 RX ORDER — TRAZODONE HYDROCHLORIDE 50 MG/1
100 TABLET ORAL
Qty: 90 TABLET | Refills: 0 | Status: SHIPPED | OUTPATIENT
Start: 2022-07-27 | End: 2022-07-27 | Stop reason: SDUPTHER

## 2022-08-17 ENCOUNTER — VBI (OUTPATIENT)
Dept: ADMINISTRATIVE | Facility: OTHER | Age: 52
End: 2022-08-17

## 2022-08-18 ENCOUNTER — CONSULT (OUTPATIENT)
Dept: CARDIOLOGY CLINIC | Facility: HOSPITAL | Age: 52
End: 2022-08-18
Attending: INTERNAL MEDICINE
Payer: COMMERCIAL

## 2022-08-18 VITALS
HEIGHT: 73 IN | BODY MASS INDEX: 23.46 KG/M2 | DIASTOLIC BLOOD PRESSURE: 98 MMHG | WEIGHT: 177 LBS | HEART RATE: 73 BPM | SYSTOLIC BLOOD PRESSURE: 160 MMHG

## 2022-08-18 DIAGNOSIS — C34.11 PRIMARY ADENOCARCINOMA OF UPPER LOBE OF RIGHT LUNG (HCC): ICD-10-CM

## 2022-08-18 DIAGNOSIS — I25.118 CORONARY ARTERY DISEASE OF NATIVE HEART WITH STABLE ANGINA PECTORIS, UNSPECIFIED VESSEL OR LESION TYPE (HCC): ICD-10-CM

## 2022-08-18 DIAGNOSIS — R06.02 SOB (SHORTNESS OF BREATH): ICD-10-CM

## 2022-08-18 DIAGNOSIS — R07.89 OTHER CHEST PAIN: Primary | ICD-10-CM

## 2022-08-18 DIAGNOSIS — E78.2 MIXED HYPERLIPIDEMIA: ICD-10-CM

## 2022-08-18 DIAGNOSIS — K21.9 GASTROESOPHAGEAL REFLUX DISEASE: ICD-10-CM

## 2022-08-18 DIAGNOSIS — R94.31 ABNORMAL EKG: ICD-10-CM

## 2022-08-18 DIAGNOSIS — Z15.89 HLA B27 POSITIVE: ICD-10-CM

## 2022-08-18 DIAGNOSIS — J43.2 CENTRILOBULAR EMPHYSEMA (HCC): ICD-10-CM

## 2022-08-18 DIAGNOSIS — M45.2 ANKYLOSING SPONDYLITIS OF CERVICAL REGION (HCC): ICD-10-CM

## 2022-08-18 DIAGNOSIS — I27.20 MILD PULMONARY HYPERTENSION (HCC): ICD-10-CM

## 2022-08-18 PROCEDURE — 99204 OFFICE O/P NEW MOD 45 MIN: CPT | Performed by: INTERNAL MEDICINE

## 2022-08-18 PROCEDURE — 93000 ELECTROCARDIOGRAM COMPLETE: CPT | Performed by: INTERNAL MEDICINE

## 2022-08-18 RX ORDER — OMEPRAZOLE 40 MG/1
40 CAPSULE, DELAYED RELEASE ORAL DAILY
Qty: 90 CAPSULE | Refills: 3 | Status: SHIPPED | OUTPATIENT
Start: 2022-08-18

## 2022-08-18 RX ORDER — ROSUVASTATIN CALCIUM 5 MG/1
5 TABLET, COATED ORAL DAILY
Qty: 90 TABLET | Refills: 3 | Status: SHIPPED | OUTPATIENT
Start: 2022-08-18
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
## 2022-08-26 DIAGNOSIS — I10 ESSENTIAL HYPERTENSION: ICD-10-CM

## 2022-08-26 RX ORDER — AMLODIPINE BESYLATE 5 MG/1
5 TABLET ORAL DAILY
Qty: 30 TABLET | Refills: 5 | Status: SHIPPED | OUTPATIENT
Start: 2022-08-26

## 2022-08-30 ENCOUNTER — PREP FOR PROCEDURE (OUTPATIENT)
Dept: BARIATRICS | Facility: CLINIC | Age: 52
End: 2022-08-30

## 2022-08-30 DIAGNOSIS — K21.9 GASTROESOPHAGEAL REFLUX DISEASE, UNSPECIFIED WHETHER ESOPHAGITIS PRESENT: Primary | ICD-10-CM

## 2022-10-03 ENCOUNTER — HOSPITAL ENCOUNTER (OUTPATIENT)
Dept: CT IMAGING | Facility: HOSPITAL | Age: 52
Discharge: HOME | End: 2022-10-03
Attending: THORACIC SURGERY (CARDIOTHORACIC VASCULAR SURGERY)
Payer: COMMERCIAL

## 2022-10-03 DIAGNOSIS — C34.11 PRIMARY ADENOCARCINOMA OF UPPER LOBE OF RIGHT LUNG (HCC): ICD-10-CM

## 2022-10-03 PROCEDURE — 71250 CT THORAX DX C-: CPT

## 2022-10-03 PROCEDURE — G1004 CDSM NDSC: HCPCS

## 2022-10-05 ENCOUNTER — TELEPHONE (OUTPATIENT)
Dept: FAMILY MEDICINE CLINIC | Facility: CLINIC | Age: 52
End: 2022-10-05

## 2022-10-05 ENCOUNTER — DOCUMENTATION (OUTPATIENT)
Dept: CARDIAC SURGERY | Facility: CLINIC | Age: 52
End: 2022-10-05

## 2022-10-05 ENCOUNTER — OFFICE VISIT (OUTPATIENT)
Dept: CARDIAC SURGERY | Facility: CLINIC | Age: 52
End: 2022-10-05
Payer: COMMERCIAL

## 2022-10-05 VITALS
WEIGHT: 180.56 LBS | HEART RATE: 88 BPM | OXYGEN SATURATION: 97 % | TEMPERATURE: 99.5 F | BODY MASS INDEX: 23.93 KG/M2 | SYSTOLIC BLOOD PRESSURE: 160 MMHG | HEIGHT: 73 IN | RESPIRATION RATE: 17 BRPM | DIASTOLIC BLOOD PRESSURE: 98 MMHG

## 2022-10-05 DIAGNOSIS — C34.11 PRIMARY ADENOCARCINOMA OF UPPER LOBE OF RIGHT LUNG (HCC): Primary | ICD-10-CM

## 2022-10-05 DIAGNOSIS — I10 ESSENTIAL HYPERTENSION: ICD-10-CM

## 2022-10-05 PROCEDURE — 99213 OFFICE O/P EST LOW 20 MIN: CPT | Performed by: THORACIC SURGERY (CARDIOTHORACIC VASCULAR SURGERY)

## 2022-10-05 RX ORDER — QUETIAPINE FUMARATE 50 MG/1
50 TABLET, FILM COATED ORAL
Qty: 30 TABLET | Refills: 0 | Status: SHIPPED | OUTPATIENT
Start: 2022-10-05 | End: 2022-10-06 | Stop reason: ALTCHOICE

## 2022-10-05 NOTE — PROGRESS NOTES
Assessment/Plan:    Primary adenocarcinoma of upper lobe of right lung [facility])  Mr Yang Soler presents nearly 1 5 years out from right robotic upper lobectomy for Stage IA adenocarcinoma  He has been dealing with anxiety over the last few days secondary to waiting on these results as well as some personal issues at home  He has some shortness of breath on exertion since surgery but this is actually improving  He still has some numbness near his incisions  His CT scan demonstrates no evidence of recurrent lung cancer  He will return in 6 months with a repeat scan for continued routine lung cancer surveillance  Diagnoses and all orders for this visit:    Primary adenocarcinoma of upper lobe of right lung ([address] )  -     CT chest wo contrast; Future          Thoracic History   Cancer Staging  No matching staging information was found for the patient  Oncology History   Primary adenocarcinoma of upper lobe of [facility] ([facility] )   6/7/2021 -  Cancer Staged    Staging form: Lung, AJCC 8th Edition  - Pathologic stage from 6/7/2021: Stage IA2 (ypT1b, pN0, cM0) - Signed by Jake Salas PA-C on 10/5/2022  Stage prefix: Post-therapy       1/19/2022 Initial Diagnosis    Primary adenocarcinoma of upper lobe of right lung (HCC)              Subjective:    Patient ID: Lucio Clifford III is a 46 y o  male  ecog 0  HPI   Mr Yang Soler is a 46year old man who presents for routine lung cancer surveillance  He has a history of Stage IA2 right upper lobe adenocarcinoma status post robotic lobectomy 6/7/2021  Chest CT 10/3/22 demonstrates no new pulmonary nodules  There is a small 7mm left upper lobe nodule and 7mm right lower lobe nodule stable since 2012  There are no enlarged mediastinal or hilar lymph nodes  On discussion, he has been experiencing abdominal pain and is being worked up for this  He is having some personal issues as well  He has a lot of anxiety surrounding these things, and his cancer follow up  He has stable MCCLELLAN      The following portions of the patient's history were reviewed and updated as appropriate: allergies, current medications, past family history, past medical history, past social history, past surgical history and problem list     Review of Systems   Constitutional: Negative  Respiratory: Positive for shortness of breath  Cardiovascular: Negative  Gastrointestinal: Negative  Musculoskeletal: Negative  Skin: Negative  Neurological: Negative  Hematological: Negative  Psychiatric/Behavioral: Positive for sleep disturbance  The patient is nervous/anxious  Objective:   Physical Exam  Vitals reviewed  Constitutional:       General: He is not in acute distress  Appearance: Normal appearance  He is well-developed  He is not diaphoretic  HENT:      Head: Normocephalic and atraumatic  Mouth/Throat:      Comments: Masked   Eyes:      General: No scleral icterus  Extraocular Movements: Extraocular movements intact  Neck:      Trachea: No tracheal deviation  Cardiovascular:      Rate and Rhythm: Regular rhythm  Tachycardia present  Pulses: Normal pulses  Heart sounds: Normal heart sounds  No murmur heard  Pulmonary:      Effort: Pulmonary effort is normal  No respiratory distress  Breath sounds: Normal breath sounds  No wheezing  Abdominal:      General: Bowel sounds are normal  There is no distension  Palpations: Abdomen is soft  Musculoskeletal:         General: Normal range of motion  Cervical back: Normal range of motion and neck supple  Right lower leg: No edema  Left lower leg: No edema  Lymphadenopathy:      Cervical: No cervical adenopathy  Skin:     General: Skin is warm and dry  Findings: No erythema  Neurological:      Mental Status: He is alert and oriented to person, place, and time  Cranial Nerves: No cranial nerve deficit     Psychiatric:         Mood and Affect: Mood normal          Behavior: Behavior normal  Thought Content: Thought content normal      /98   Pulse 88   Temp 99 5 °F (37 5 °C)   Resp 17   Ht 6' 1" (1 854 m)   Wt 81 9 kg (180 lb 8 9 oz)   SpO2 97%   BMI 23 82 kg/m²       CT chest wo contrast    Result Date: 10/3/2022  Narrative CT CHEST WITHOUT IV CONTRAST INDICATION:   C34 11: Malignant neoplasm of upper lobe, right bronchus or lung  COMPARISON:  CT chest 6/19/2022, 3/8/2019 and 7/2/2012 TECHNIQUE: CT examination of the chest was performed without intravenous contrast  Axial, sagittal, and coronal 2D reformatted images were created from the source data and submitted for interpretation  Radiation dose length product (DLP) for this visit:  197 04 mGy-cm   This examination, like all CT scans performed in the [facility], was performed utilizing techniques to minimize radiation dose exposure, including the use of iterative  reconstruction and automated exposure control  FINDINGS: LUNGS:  Post right upper lobectomy  No new pulmonary nodule  7 mm discoid subpleural nodules in the posterior left upper lobe image 27 series 3 and in the posterior right lower lobe image 46 series 3 unchanged as far back as July 2012  Stable punctate intrapulmonary lymph node in the left lower lobe along the major fissure  Stable chronic scarring in the right middle lobe present as far back as March 2019  COPD  No infiltrate  Central airways are clear  PLEURA:  Unremarkable  HEART/GREAT VESSELS: Heart is not enlarged  No pericardial effusion  Aortic and coronary artery calcification  No thoracic aortic aneurysm  MEDIASTINUM AND SOHA:  Right perihilar postsurgical changes  Otherwise unremarkable  CHEST WALL AND LOWER NECK:  Stable trace bilateral gynecomastia  VISUALIZED STRUCTURES IN THE UPPER ABDOMEN:  Postcholecystectomy  Post Piper-en-Y gastric bypass  Colonic diverticulosis  OSSEOUS STRUCTURES:  No acute fracture or osseous destructive lesion identified   Mild degenerative changes of the spine      Impression No evidence of recurrent tumor or thoracic metastatic disease post right upper lobectomy  Workstation performed: [id number]     CT chest without contrast    Result Date: 6/1/2022  Narrative CT CHEST WITHOUT IV CONTRAST INDICATION:   Several episodes of small volume hemoptysis  PSH right lung robotic surgery for adenocarcinoma    COMPARISON:  None  TECHNIQUE: CT examination of the chest was performed without intravenous contrast  This examination was performed without intravenous contrast in the context of the critical nationwide Omnipaque shortage  Axial, sagittal, and coronal 2D reformatted images were created from the source data and submitted for interpretation  Radiation dose length product (DLP) for this visit:  506 5 mGy-cm   This examination, like all CT scans performed in the [facility], was performed utilizing techniques to minimize radiation dose exposure, including the use of iterative reconstruction and automated exposure control  FINDINGS: LUNGS:  Postsurgical change from right upper lobectomy  Stable mild scarring in the right middle and lower lobes  Subpleural 6 mm pulmonary nodule in the right lower lobe, series 5 image 57, stable  Previously visualized 4 mm pulmonary nodule in the right middle lobe now measures 3 mm, image 60  Stable 6 mm nodular opacity along the left major fissure, image 36, likely to represent a lymph node  Similar nodular opacity along the left major fissure, image 72 measuring 3 mm, also stable  There is no tracheal or endobronchial lesion  PLEURA:  No effusion  HEART/GREAT VESSELS: Normal heart size  No thoracic aortic aneurysm  MEDIASTINUM AND SOHA:  Unremarkable  CHEST WALL AND LOWER NECK:  Unremarkable  VISUALIZED STRUCTURES IN THE UPPER ABDOMEN:  Cholecystectomy  Postsurgical change from gastric bypass  OSSEOUS STRUCTURES:  Stable sclerosis in the left aspect of the C6 and C7 vertebral bodies and C7 pedicle  Impression 1  No acute cardiopulmonary process  2   Stable pulmonary nodules measuring up to 6 mm  Continued follow-up as previously recommended  Workstation performed: [id number]     CT chest wo contrast    Result Date: 4/5/2022  Narrative CT CHEST WITHOUT IV CONTRAST INDICATION:   C34 11: Malignant neoplasm of upper lobe, right bronchus or lung   right upper lobe lung mass status post 06/07/2021 robotic right upper lobectomy for what proved to be a stage I A 2 adenocarcinoma COMPARISON:  Multiple priors most recently radiographs 8/30/2021 TECHNIQUE: CT examination of the chest was performed without intravenous contrast   Axial, sagittal, and coronal 2D reformatted images were created from the source data and submitted for interpretation  Radiation dose length product (DLP) for this visit:  266 47 mGy-cm   This examination, like all CT scans performed in the [facility], was performed utilizing techniques to minimize radiation dose exposure, including the use of iterative  reconstruction and automated exposure control  FINDINGS: LUNGS:  Right upper lobectomy  Emphysema  Small reticular opacity in the lateral right lower lobe at site of previously seen groundglass opacity, likely scarring (series 3, image 39)  0 6 cm right lower lobe pulmonary nodule appears to have been present dating back to 2013, benign (series 3, image 49)  Incidental 0 4 cm right middle lobe pulmonary nodule unchanged from multiple prior examinations (series 3, image 55)  0 6 cm nodular opacity along the left major fissure likely an intrapulmonary  lymph node present since 2013 (series 3, image 27)  PLEURA:  Unremarkable  HEART/GREAT VESSELS: Heart is unremarkable for patient's age  No thoracic aortic aneurysm  MEDIASTINUM AND SOHA:  Unremarkable  CHEST WALL AND LOWER NECK:   Unremarkable  VISUALIZED STRUCTURES IN THE UPPER ABDOMEN:  No acute abnormality or suspicious mass lesion   OSSEOUS STRUCTURES:  No acute fracture or destructive osseous lesion  Impression Small reticular opacity in the right lower lobe less apparent than on most recent CT, likely representing scarring from prior infection or inflammation in this region  Additional small pulmonary nodules throughout the lungs are unchanged  No evidence of residual or recurrent neoplasm  No abnormal lymphadenopathy  Recommend continued surveillance as indicated  Workstation performed: [id number]     CT chest abdomen pelvis wo contrast    Result Date: 6/19/2022  Narrative CT CHEST, ABDOMEN AND PELVIS WITHOUT IV CONTRAST INDICATION:   hx of R lung resection year ago (right upper lobe adenocarcinoma), now with intermittent pain in R chest; pain in R abdomen and flank, r/o divetic, appy  COMPARISON:  CT chest 6/1/2022 TECHNIQUE: CT examination of the chest, abdomen and pelvis was performed without intravenous contrast  This examination was performed without intravenous contrast in the context of the critical nationwide Omnipaque shortage  Axial, sagittal, and coronal 2D reformatted images were created from the source data and submitted for interpretation  Radiation dose length product (DLP) for this visit:  643 mGy-cm   This examination, like all CT scans performed in the [facility], was performed utilizing techniques to minimize radiation dose exposure, including the use of iterative reconstruction and automated exposure control  Enteric contrast was not administered  FINDINGS: CHEST LUNGS:  Postoperative change reflecting right upper lobectomy again noted  Some scarring in the right lower lobe adjacent to the major fissure noted as well as within the right middle lobe (series 3 image 52) similar to the prior examination  Stable 5 x 3 mm posterior subpleural nodule in the lower lobe noted (series 3 image 47)    Stable left upper lobe nodule adjacent to the superior aspect of the major fissure again noted measuring 6 x 3 mm (series 3 image 23) with stable probable intrapulmonary lymph node in the left lower lobe adjacent to the major fissure (series 3 image 62)  No new nodules are identified  The central airways are patent  No focal pulmonary opacification Mikle Denise is appreciated  PLEURA:  Unremarkable  HEART/GREAT VESSELS: Heart is normal in size  Mild coronary artery calcification is present  No thoracic aortic aneurysm  MEDIASTINUM AND SOHA:  Unremarkable  CHEST WALL AND LOWER NECK:  Mild gynecomastia present  ABDOMEN LIVER/BILIARY TREE:  Unremarkable  GALLBLADDER:  Gallbladder is surgically absent  SPLEEN:  Unremarkable  PANCREAS:  Unremarkable  ADRENAL GLANDS:  Unremarkable  KIDNEYS/URETERS:  Unremarkable  No hydronephrosis  STOMACH AND BOWEL:  Status post Piper-en-Y gastric bypass  There is some fluid and gas identified within the excluded gastric remnant similar the prior study which raises the possibility of an underlying gastrogastric fistula versus reflux up the biliopancreatic limb  Nonemergent bariatric consultation suggested  No bowel wall thickening noted  Scattered colonic diverticula are present without evidence for acute diverticulitis  Mild retained fecal material in the colon is noted  No intestinal obstruction is identified  APPENDIX:  A normal appendix was visualized  ABDOMINOPELVIC CAVITY:  No ascites  No pneumoperitoneum  No lymphadenopathy  VESSELS:  Atherosclerotic changes are present  No evidence of aneurysm  PELVIS REPRODUCTIVE ORGANS:  Mild prostatomegaly  URINARY BLADDER:  Mild nonspecific circumferential bladder wall thickening is identified  ABDOMINAL WALL/INGUINAL REGIONS:  Small fat-containing right inguinal hernia without bowel involvement noted  OSSEOUS STRUCTURES:  No acute fracture or destructive osseous lesion  Impression 1  No acute abnormality identified in the chest, abdomen, or pelvis   2   Status post right upper lobectomy with stable appearance the chest  3   Stable pulmonary nodules measuring up to 6 mm as detailed above  Based on current Fleischner Society 2017 Guidelines on incidental pulmonary nodule, patients with a known malignancy are at increased risk of metastasis and should receive followup CT at intervals appropriate for the type of cancer and its risk of pulmonary metastases  4   Status post Piper-en-Y gastric bypass with gas and fluid again noted in the gastric remnant 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  Nonemergent bariatric surgery consultation is advised  5   Diverticulosis without evidence for acute diverticulitis  6   Additional findings as noted  The study was marked in [facility] for immediate notification    Workstation performed: [id number]

## 2022-10-05 NOTE — ASSESSMENT & PLAN NOTE
Mr Joe Lopez presents nearly 1 5 years out from right robotic upper lobectomy for Stage IA adenocarcinoma  He has been dealing with anxiety over the last few days secondary to waiting on these results as well as some personal issues at home  He has some shortness of breath on exertion since surgery but this is actually improving  He still has some numbness near his incisions  His CT scan demonstrates no evidence of recurrent lung cancer  He will return in 6 months with a repeat scan for continued routine lung cancer surveillance

## 2022-10-05 NOTE — PROGRESS NOTES
I met with leanna at his visit with Dr Bella Walsh today  Patient was experiencing severe anxiety  Much support provided  Patient declined contact with [facility]  Will see PCP tomorrow to request medication for anxiety

## 2022-10-05 NOTE — TELEPHONE ENCOUNTER
Pt is scheduled for 11:30 10/6    but cannot reach pt by phone, not accepting calls, specifically verified number before sending DR msg  will try again later and a [facility] msg sent

## 2022-10-05 NOTE — TELEPHONE ENCOUNTER
Pt needs appt today or tomorrow,severe panic attack while in [facility] thoracic surg,call from [place],request called in by [facility]

## 2022-10-06 ENCOUNTER — OFFICE VISIT (OUTPATIENT)
Dept: FAMILY MEDICINE CLINIC | Facility: CLINIC | Age: 52
End: 2022-10-06
Payer: COMMERCIAL

## 2022-10-06 VITALS
RESPIRATION RATE: 20 BRPM | TEMPERATURE: 98.5 F | HEART RATE: 110 BPM | DIASTOLIC BLOOD PRESSURE: 76 MMHG | BODY MASS INDEX: 23.7 KG/M2 | SYSTOLIC BLOOD PRESSURE: 140 MMHG | HEIGHT: 73 IN | WEIGHT: 178.8 LBS

## 2022-10-06 DIAGNOSIS — F41.1 GENERALIZED ANXIETY DISORDER: ICD-10-CM

## 2022-10-06 DIAGNOSIS — F33.2 SEVERE EPISODE OF RECURRENT MAJOR DEPRESSIVE DISORDER, WITHOUT PSYCHOTIC FEATURES (HCC): Primary | ICD-10-CM

## 2022-10-06 PROBLEM — F33.41 RECURRENT MAJOR DEPRESSIVE DISORDER, IN PARTIAL REMISSION (HCC): Status: RESOLVED | Noted: 2021-04-05 | Resolved: 2022-10-06

## 2022-10-06 PROBLEM — F41.0 PANIC ATTACK: Status: ACTIVE | Noted: 2022-10-06

## 2022-10-06 PROCEDURE — 99214 OFFICE O/P EST MOD 30 MIN: CPT | Performed by: INTERNAL MEDICINE

## 2022-10-06 RX ORDER — ARIPIPRAZOLE 10 MG/1
10 TABLET ORAL DAILY
Qty: 30 TABLET | Refills: 1 | Status: SHIPPED | OUTPATIENT
Start: 2022-10-06

## 2022-10-06 RX ORDER — CLONAZEPAM 0.5 MG/1
0.5 TABLET ORAL 2 TIMES DAILY PRN
Qty: 30 TABLET | Refills: 0 | Status: SHIPPED | OUTPATIENT
Start: 2022-10-06 | End: 2022-10-21 | Stop reason: SDUPTHER

## 2022-10-06 NOTE — PROGRESS NOTES
Name: Thi Barr III      : 1970      MRN: [id number]  Encounter Provider: Dellar Fabry, DO  Encounter Date: 10/6/2022   Encounter department: [address]     1  Severe episode of recurrent major depressive disorder, without psychotic features ([facility] )  -     clonazePAM (KlonoPIN) 0 5 mg tablet; Take 1 tablet (0 5 mg total) by mouth 2 (two) times a day as needed for seizures  -     ARIPiprazole (ABILIFY) 10 mg tablet; Take 1 tablet (10 mg total) by mouth daily  Pt has contact info for [facility] crisis and [facility] 24 hour service if needed   He is hoping to manage as outpt and vowed to call therapist today that he had worked with in past   He denied any harmful thoughts and will seek help if sxs change     2  Generalized anxiety disorder  -     clonazePAM (KlonoPIN) 0 5 mg tablet; Take 1 tablet (0 5 mg total) by mouth 2 (two) times a day as needed for seizures  -     ARIPiprazole (ABILIFY) 10 mg tablet;  Take 1 tablet (10 mg total) by mouth daily  Start abilify Dc seroquel Can use klonopin as needed upt to BID to help with acute anxiety sxs   Pt is aware of crisis information and to seek help if sxs change     Rto 2 weeks/update via my chart sxs in next 24-48 hours        Subjective      HPI   Pt wife left the home a few days ago They have had many issues for extended time and pt had spoke about leaving in past but they argued and she left He has been anxious and not able to sleep/rest for several days He had not been taking his trazadone or seroquel until yesterday His son is back on drugs and still sort opf living with him which is an added stressor and he said his daughter is losing her job so she has her own issues currently Pt states he knows his wife needs help but she has left financial debt and he likely will lose the home in the future   He has no plan to harm himself and states he would not do that because of kids/grandkids     Review of Systems   Constitutional: Positive for activity change, appetite change and fatigue  Negative for chills and fever  Eyes: Negative for visual disturbance  Respiratory: Negative for cough and shortness of breath  Cardiovascular: Positive for palpitations  Negative for chest pain and leg swelling  Gastrointestinal: Positive for nausea  Musculoskeletal: Positive for arthralgias and back pain  Neurological: Negative for dizziness, light-headedness and headaches  Psychiatric/Behavioral: Positive for decreased concentration and sleep disturbance  Negative for agitation, self-injury and suicidal ideas  The patient is nervous/anxious  Current Outpatient Medications on File Prior to Visit   Medication Sig    amLODIPine (NORVASC) 5 mg tablet Take 1 tablet (5 mg total) by mouth daily    famotidine (PEPCID) 20 mg tablet Take 1 tablet (20 mg total) by mouth 2 (two) times a day    Multiple Vitamins-Minerals (MULTI COMPLETE) CAPS Take by mouth daily     omeprazole (PriLOSEC) 40 MG capsule Take 1 capsule (40 mg total) by mouth daily    rosuvastatin (CRESTOR) 5 mg tablet Take 1 tablet (5 mg total) by mouth daily    topiramate (TOPAMAX) 50 MG tablet Take 1 tablet (50 mg total) by mouth 2 (two) times a day    traZODone (DESYREL) 50 mg tablet Take 2 tablets (100 mg total) by mouth daily at bedtime    [DISCONTINUED] QUEtiapine (SEROquel) 50 mg tablet Take 1 tablet (50 mg total) by mouth daily at bedtime    sucralfate (CARAFATE) 1 g/10 mL suspension Take 10 mL (1 g total) by mouth 4 (four) times a day (with meals and at bedtime) (Patient not taking: No sig reported)    [DISCONTINUED] fluticasone-vilanterol (Breo Ellipta) 100-25 mcg/inh inhaler Inhale 1 puff daily Rinse mouth after use   (Patient not taking: No sig reported)    [DISCONTINUED] gabapentin (Neurontin) 300 mg capsule Take 1 capsule (300 mg total) by mouth 2 (two) times a day (Patient not taking: Reported on 10/6/2022)    [DISCONTINUED] ibuprofen (MOTRIN) 600 mg tablet Take 1 tablet (600 mg total) by mouth every 8 (eight) hours for 7 days (Patient not taking: No sig reported)       Objective     /76   Pulse (!) 110   Temp 98 5 °F (36 9 °C) (Temporal)   Resp 20   Ht 6' 1" (1 854 m)   Wt 81 1 kg (178 lb 12 8 oz)   BMI 23 59 kg/m²     Physical Exam  Vitals reviewed  Constitutional:       General: He is in acute distress  Comments: Anxious, tapping foot    HENT:      Head: Normocephalic and atraumatic  Right Ear: External ear normal       Left Ear: External ear normal       Nose: Nose normal       Mouth/Throat:      Mouth: Mucous membranes are dry  Eyes:      General: No scleral icterus  Cardiovascular:      Rate and Rhythm: Tachycardia present  Pulmonary:      Effort: Pulmonary effort is normal  No respiratory distress  Breath sounds: No wheezing  Abdominal:      General: Bowel sounds are normal       Palpations: Abdomen is soft  Musculoskeletal:      Cervical back: Neck supple  Right lower leg: No edema  Left lower leg: No edema  Skin:     General: Skin is dry  Coloration: Skin is pale  Neurological:      General: No focal deficit present  Mental Status: He is alert and oriented to person, place, and time  Mental status is at baseline  Psychiatric:         Mood and Affect: Mood normal          Behavior: Behavior normal          Thought Content:  Thought content normal          Judgment: Judgment normal        Lenin Curran DO

## 2022-10-14 ENCOUNTER — TELEPHONE (OUTPATIENT)
Dept: BARIATRICS | Facility: CLINIC | Age: 52
End: 2022-10-14

## 2022-10-14 NOTE — TELEPHONE ENCOUNTER
Attempted to call patient to remind about EGD appointment scheduled for 10/19/2022  Phone number not available and was not able to leave a message  yes

## 2022-10-17 RX ORDER — SODIUM CHLORIDE 9 MG/ML
125 INJECTION, SOLUTION INTRAVENOUS CONTINUOUS
Status: CANCELLED | OUTPATIENT
Start: 2023-01-11

## 2022-10-20 ENCOUNTER — TELEPHONE (OUTPATIENT)
Dept: FAMILY MEDICINE CLINIC | Facility: CLINIC | Age: 52
End: 2022-10-20

## 2022-10-20 ENCOUNTER — RA CDI HCC (OUTPATIENT)
Dept: OTHER | Facility: HOSPITAL | Age: 52
End: 2022-10-20

## 2022-10-20 NOTE — TELEPHONE ENCOUNTER
Pt has appt tomorrow morning at 7:15, but doesn't have a ride to get here  Asking if he could just talk to you on the phone for the visit as he still wants to keep appt, but can't figure out the virtual and can't come into the office  Please advise

## 2022-10-20 NOTE — PROGRESS NOTES
[facility] [[email or web address]  coding opportunities       Chart reviewed, no opportunity found:   [address]        Patients Insurance     Medicare Insurance: [employer]

## 2022-10-21 ENCOUNTER — TELEMEDICINE (OUTPATIENT)
Dept: FAMILY MEDICINE CLINIC | Facility: CLINIC | Age: 52
End: 2022-10-21
Payer: COMMERCIAL

## 2022-10-21 DIAGNOSIS — F41.1 GENERALIZED ANXIETY DISORDER: ICD-10-CM

## 2022-10-21 DIAGNOSIS — C34.11 PRIMARY ADENOCARCINOMA OF UPPER LOBE OF RIGHT LUNG (HCC): ICD-10-CM

## 2022-10-21 DIAGNOSIS — F33.2 SEVERE EPISODE OF RECURRENT MAJOR DEPRESSIVE DISORDER, WITHOUT PSYCHOTIC FEATURES (HCC): ICD-10-CM

## 2022-10-21 DIAGNOSIS — F41.0 PANIC ATTACK: Primary | ICD-10-CM

## 2022-10-21 PROBLEM — L98.9 SKIN LESION: Status: RESOLVED | Noted: 2022-01-19 | Resolved: 2022-10-21

## 2022-10-21 PROBLEM — D38.1 NEOPLASM OF UNCERTAIN BEHAVIOR OF RIGHT UPPER LOBE OF LUNG: Status: RESOLVED | Noted: 2021-04-13 | Resolved: 2022-10-21

## 2022-10-21 PROBLEM — R06.02 SOB (SHORTNESS OF BREATH): Status: RESOLVED | Noted: 2021-08-25 | Resolved: 2022-10-21

## 2022-10-21 PROCEDURE — 99213 OFFICE O/P EST LOW 20 MIN: CPT | Performed by: INTERNAL MEDICINE

## 2022-10-21 RX ORDER — CLONAZEPAM 0.5 MG/1
0.5 TABLET ORAL 2 TIMES DAILY PRN
Qty: 30 TABLET | Refills: 0 | Status: SHIPPED | OUTPATIENT
Start: 2022-10-21

## 2022-10-21 NOTE — PROGRESS NOTES
Virtual Brief Visit    Patient is located in the following state in which I hold an active license [place]      Assessment/Plan:    Problem List Items Addressed This Visit        Respiratory    Primary adenocarcinoma of upper lobe of right lung ([address] )       Other    Panic attack - Primary    Severe episode of recurrent major depressive disorder, without psychotic features (HCC)    Relevant Medications    clonazePAM (KlonoPIN) 0 5 mg tablet      Other Visit Diagnoses     Generalized anxiety disorder        Relevant Medications    clonazePAM (KlonoPIN) 0 5 mg tablet        Pt is taking meds as prescribed and managing ok although still has not spoken with his wife despite his attempts  His son will be going to rehab in AM which has been a relief for him  He is hoping to see his grnadkids this weekend and wants to be around family   He has not established therapy yet but has a good friend he has been talking to and neighbors that help with groceries etc     Pt awake and alert and less anxious He does get some sleep now and feels the prn klonopin has helped while the abilify was started  He is eating a bit more steadily now and managing at home on his own currently   No sob or wheeze  No n/v/d   He is speaking with his daughter and hopes to have his grandkids this weekend which he is looking forward to   Recent Visits  Date Type Provider Dept   10/20/22 Telephone [facility]   Showing recent visits within past 7 days and meeting all other requirements  Today's Visits  Date Type Provider Dept   10/21/22 Telemedicine Wilner Palmer DO [facility]   Showing today's visits and meeting all other requirements  Future Appointments  No visits were found meeting these conditions    Showing future appointments within next 150 days and meeting all other requirements         I spent 10 minutes directly with the patient during this visit

## 2022-10-27 LAB — HBA1C MFR BLD HPLC: 6.5 %

## 2022-11-19 ENCOUNTER — APPOINTMENT (EMERGENCY)
Dept: CT IMAGING | Facility: HOSPITAL | Age: 52
End: 2022-11-19

## 2022-11-19 ENCOUNTER — HOSPITAL ENCOUNTER (EMERGENCY)
Facility: HOSPITAL | Age: 52
Discharge: HOME | End: 2022-11-19
Attending: EMERGENCY MEDICINE

## 2022-11-19 VITALS
TEMPERATURE: 99.2 F | HEART RATE: 79 BPM | OXYGEN SATURATION: 97 % | SYSTOLIC BLOOD PRESSURE: 168 MMHG | DIASTOLIC BLOOD PRESSURE: 92 MMHG | BODY MASS INDEX: 24.96 KG/M2 | RESPIRATION RATE: 18 BRPM | HEIGHT: 72 IN | WEIGHT: 184.3 LBS

## 2022-11-19 DIAGNOSIS — K11.21 ACUTE PAROTITIS: Primary | ICD-10-CM

## 2022-11-19 LAB
ANION GAP SERPL CALCULATED.3IONS-SCNC: 10 MMOL/L (ref 4–13)
BASOPHILS # BLD AUTO: 0.06 THOUSANDS/ÂΜL (ref 0–0.1)
BASOPHILS NFR BLD AUTO: 1 % (ref 0–1)
BUN SERPL-MCNC: 12 MG/DL (ref 5–25)
CALCIUM SERPL-MCNC: 9 MG/DL (ref 8.3–10.1)
CHLORIDE SERPL-SCNC: 101 MMOL/L (ref 96–108)
CO2 SERPL-SCNC: 27 MMOL/L (ref 21–32)
CREAT SERPL-MCNC: 0.95 MG/DL (ref 0.6–1.3)
EOSINOPHIL # BLD AUTO: 0.09 THOUSAND/ÂΜL (ref 0–0.61)
EOSINOPHIL NFR BLD AUTO: 2 % (ref 0–6)
ERYTHROCYTE [DISTWIDTH] IN BLOOD BY AUTOMATED COUNT: 13 % (ref 11.6–15.1)
GFR SERPL CREATININE-BSD FRML MDRD: 91 ML/MIN/1.73SQ M
GLUCOSE SERPL-MCNC: 88 MG/DL (ref 65–140)
HCT VFR BLD AUTO: 45.4 % (ref 36.5–49.3)
HGB BLD-MCNC: 15.6 G/DL (ref 12–17)
IMM GRANULOCYTES # BLD AUTO: 0.02 THOUSAND/UL (ref 0–0.2)
IMM GRANULOCYTES NFR BLD AUTO: 0 % (ref 0–2)
LYMPHOCYTES # BLD AUTO: 1.34 THOUSANDS/ÂΜL (ref 0.6–4.47)
LYMPHOCYTES NFR BLD AUTO: 22 % (ref 14–44)
MCH RBC QN AUTO: 32.2 PG (ref 26.8–34.3)
MCHC RBC AUTO-ENTMCNC: 34.4 G/DL (ref 31.4–37.4)
MCV RBC AUTO: 94 FL (ref 82–98)
MONOCYTES # BLD AUTO: 0.45 THOUSAND/ÂΜL (ref 0.17–1.22)
MONOCYTES NFR BLD AUTO: 8 % (ref 4–12)
NEUTROPHILS # BLD AUTO: 4.02 THOUSANDS/ÂΜL (ref 1.85–7.62)
NEUTS SEG NFR BLD AUTO: 67 % (ref 43–75)
NRBC BLD AUTO-RTO: 0 /100 WBCS
PLATELET # BLD AUTO: 280 THOUSANDS/UL (ref 149–390)
PMV BLD AUTO: 8.3 FL (ref 8.9–12.7)
POTASSIUM SERPL-SCNC: 4 MMOL/L (ref 3.5–5.3)
RBC # BLD AUTO: 4.84 MILLION/UL (ref 3.88–5.62)
SODIUM SERPL-SCNC: 138 MMOL/L (ref 135–147)
WBC # BLD AUTO: 5.98 THOUSAND/UL (ref 4.31–10.16)

## 2022-11-19 RX ORDER — AMOXICILLIN AND CLAVULANATE POTASSIUM 875; 125 MG/1; MG/1
1 TABLET, FILM COATED ORAL ONCE
Status: COMPLETED | OUTPATIENT
Start: 2022-11-19 | End: 2022-11-19

## 2022-11-19 RX ORDER — KETOROLAC TROMETHAMINE 30 MG/ML
15 INJECTION, SOLUTION INTRAMUSCULAR; INTRAVENOUS ONCE
Status: COMPLETED | OUTPATIENT
Start: 2022-11-19 | End: 2022-11-19

## 2022-11-19 RX ORDER — AMOXICILLIN AND CLAVULANATE POTASSIUM 875; 125 MG/1; MG/1
1 TABLET, FILM COATED ORAL EVERY 12 HOURS
Qty: 20 TABLET | Refills: 0 | Status: SHIPPED | OUTPATIENT
Start: 2022-11-19 | End: 2022-11-19 | Stop reason: CLARIF

## 2022-11-19 RX ORDER — IBUPROFEN 600 MG/1
600 TABLET ORAL EVERY 6 HOURS PRN
Qty: 20 TABLET | Refills: 0 | Status: SHIPPED | OUTPATIENT
Start: 2022-11-19 | End: 2022-11-19 | Stop reason: CLARIF

## 2022-11-19 RX ORDER — IBUPROFEN 600 MG/1
600 TABLET ORAL EVERY 6 HOURS PRN
Qty: 20 TABLET | Refills: 0 | Status: ON HOLD | OUTPATIENT
Start: 2022-11-19

## 2022-11-19 RX ORDER — AMOXICILLIN AND CLAVULANATE POTASSIUM 875; 125 MG/1; MG/1
1 TABLET, FILM COATED ORAL EVERY 12 HOURS
Qty: 20 TABLET | Refills: 0 | Status: SHIPPED | OUTPATIENT
Start: 2022-11-19 | End: 2022-11-29

## 2022-11-19 RX ADMIN — AMOXICILLIN AND CLAVULANATE POTASSIUM 1 TABLET: 875; 125 TABLET, FILM COATED ORAL at 19:58

## 2022-11-19 RX ADMIN — IOHEXOL 85 ML: 350 INJECTION, SOLUTION INTRAVENOUS at 18:12

## 2022-11-19 RX ADMIN — KETOROLAC TROMETHAMINE 15 MG: 30 INJECTION, SOLUTION INTRAMUSCULAR at 16:53
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
## 2022-12-01 DIAGNOSIS — F51.01 PRIMARY INSOMNIA: ICD-10-CM

## 2022-12-01 DIAGNOSIS — F33.41 RECURRENT MAJOR DEPRESSIVE DISORDER, IN PARTIAL REMISSION (HCC): Primary | ICD-10-CM

## 2022-12-01 RX ORDER — QUETIAPINE FUMARATE 50 MG/1
50 TABLET, FILM COATED ORAL
Qty: 30 TABLET | Refills: 0 | Status: ON HOLD | OUTPATIENT
Start: 2022-12-01

## 2022-12-01 RX ORDER — TRAZODONE HYDROCHLORIDE 50 MG/1
100 TABLET ORAL
Qty: 90 TABLET | Refills: 0 | Status: ON HOLD | OUTPATIENT
Start: 2022-12-01 | End: 2023-03-01

## 2022-12-01 RX ORDER — QUETIAPINE FUMARATE 50 MG/1
50 TABLET, FILM COATED ORAL
COMMUNITY
End: 2022-12-01 | Stop reason: SDUPTHER

## 2022-12-01 NOTE — TELEPHONE ENCOUNTER
Pt does not take Abilify states he takes the Quetiapine 50mg he states you filled it on 10/5 and he had a refill on 11/2  States he needs this or he does not sleep

## 2022-12-01 NOTE — TELEPHONE ENCOUNTER
Pt takes Trazodone 100mg and he needs these refilled at [facility]   I do not see them on his list

## 2022-12-01 NOTE — TELEPHONE ENCOUNTER
Pt called asking for refill of Seroquel 50mg to be sent to [facility] I do not see this medication in pt chart

## 2022-12-01 NOTE — TELEPHONE ENCOUNTER
Can someone please verify with Daxa as the Rx for abilify was sent on 10/5 and it had one refill so that is what we have record of being filled  The seroquel should be active if it was refilled?

## 2022-12-05 DIAGNOSIS — F33.2 SEVERE EPISODE OF RECURRENT MAJOR DEPRESSIVE DISORDER, WITHOUT PSYCHOTIC FEATURES (HCC): ICD-10-CM

## 2022-12-05 DIAGNOSIS — F41.1 GENERALIZED ANXIETY DISORDER: ICD-10-CM

## 2022-12-05 RX ORDER — CLONAZEPAM 0.5 MG/1
0.5 TABLET ORAL 2 TIMES DAILY PRN
Qty: 30 TABLET | Refills: 0 | Status: SHIPPED | OUTPATIENT
Start: 2022-12-05 | End: 2022-12-16

## 2022-12-08 ENCOUNTER — HOSPITAL ENCOUNTER (EMERGENCY)
Facility: HOSPITAL | Age: 52
End: 2022-12-08
Attending: EMERGENCY MEDICINE

## 2022-12-08 ENCOUNTER — HOSPITAL ENCOUNTER (INPATIENT)
Facility: HOSPITAL | Age: 52
LOS: 8 days | Discharge: HOME | End: 2022-12-16
Attending: PSYCHIATRY & NEUROLOGY | Admitting: PSYCHIATRY & NEUROLOGY

## 2022-12-08 VITALS
DIASTOLIC BLOOD PRESSURE: 73 MMHG | TEMPERATURE: 98.3 F | OXYGEN SATURATION: 95 % | SYSTOLIC BLOOD PRESSURE: 119 MMHG | BODY MASS INDEX: 24.92 KG/M2 | RESPIRATION RATE: 16 BRPM | HEIGHT: 72 IN | WEIGHT: 184 LBS | HEART RATE: 87 BPM

## 2022-12-08 DIAGNOSIS — I10 ESSENTIAL HYPERTENSION: ICD-10-CM

## 2022-12-08 DIAGNOSIS — F33.2 MDD (MAJOR DEPRESSIVE DISORDER), RECURRENT EPISODE, SEVERE (HCC): Primary | ICD-10-CM

## 2022-12-08 DIAGNOSIS — E55.9 VITAMIN D DEFICIENCY: ICD-10-CM

## 2022-12-08 DIAGNOSIS — F51.01 PRIMARY INSOMNIA: ICD-10-CM

## 2022-12-08 DIAGNOSIS — F10.20 MODERATE ALCOHOL USE DISORDER (HCC): Chronic | ICD-10-CM

## 2022-12-08 DIAGNOSIS — Z76.89 ENCOUNTER FOR PSYCHIATRIC ASSESSMENT: ICD-10-CM

## 2022-12-08 DIAGNOSIS — I25.118 CORONARY ARTERY DISEASE OF NATIVE HEART WITH STABLE ANGINA PECTORIS, UNSPECIFIED VESSEL OR LESION TYPE (HCC): ICD-10-CM

## 2022-12-08 DIAGNOSIS — Z76.89 ENCOUNTER FOR PSYCHIATRIC ASSESSMENT: Primary | ICD-10-CM

## 2022-12-08 DIAGNOSIS — F41.9 ANXIETY: ICD-10-CM

## 2022-12-08 DIAGNOSIS — R10.9 ABDOMINAL PAIN: ICD-10-CM

## 2022-12-08 PROBLEM — F33.1 MAJOR DEPRESSIVE DISORDER, RECURRENT EPISODE, MODERATE (HCC): Status: ACTIVE | Noted: 2022-12-08

## 2022-12-08 LAB
AMPHETAMINES SERPL QL SCN: NEGATIVE
BARBITURATES UR QL: NEGATIVE
BENZODIAZ UR QL: NEGATIVE
COCAINE UR QL: NEGATIVE
ETHANOL EXG-MCNC: 0.02 MG/DL
ETHANOL EXG-MCNC: 0.02 MG/DL
FLUAV RNA RESP QL NAA+PROBE: NEGATIVE
FLUBV RNA RESP QL NAA+PROBE: NEGATIVE
METHADONE UR QL: NEGATIVE
OPIATES UR QL SCN: NEGATIVE
OXYCODONE+OXYMORPHONE UR QL SCN: NEGATIVE
PCP UR QL: NEGATIVE
RSV RNA RESP QL NAA+PROBE: NEGATIVE
SARS-COV-2 RNA RESP QL NAA+PROBE: NEGATIVE
THC UR QL: NEGATIVE

## 2022-12-08 RX ORDER — QUETIAPINE FUMARATE 25 MG/1
50 TABLET, FILM COATED ORAL
Status: CANCELLED | OUTPATIENT
Start: 2022-12-08

## 2022-12-08 RX ORDER — RISPERIDONE 0.25 MG/1
0.25 TABLET ORAL
Status: CANCELLED | OUTPATIENT
Start: 2022-12-08

## 2022-12-08 RX ORDER — MAGNESIUM HYDROXIDE/ALUMINUM HYDROXICE/SIMETHICONE 120; 1200; 1200 MG/30ML; MG/30ML; MG/30ML
30 SUSPENSION ORAL EVERY 4 HOURS PRN
Status: DISCONTINUED | OUTPATIENT
Start: 2022-12-08 | End: 2022-12-16 | Stop reason: HOSPADM

## 2022-12-08 RX ORDER — RISPERIDONE 1 MG/1
1 TABLET ORAL
Status: DISCONTINUED | OUTPATIENT
Start: 2022-12-08 | End: 2022-12-16 | Stop reason: HOSPADM

## 2022-12-08 RX ORDER — RISPERIDONE 1 MG/1
1 TABLET ORAL
Status: CANCELLED | OUTPATIENT
Start: 2022-12-08

## 2022-12-08 RX ORDER — TRAZODONE HYDROCHLORIDE 50 MG/1
100 TABLET ORAL
Status: CANCELLED | OUTPATIENT
Start: 2022-12-08

## 2022-12-08 RX ORDER — LORAZEPAM 1 MG/1
1 TABLET ORAL ONCE
Status: COMPLETED | OUTPATIENT
Start: 2022-12-08 | End: 2022-12-08

## 2022-12-08 RX ORDER — HALOPERIDOL 5 MG/ML
5 INJECTION INTRAMUSCULAR
Status: DISCONTINUED | OUTPATIENT
Start: 2022-12-08 | End: 2022-12-16 | Stop reason: HOSPADM

## 2022-12-08 RX ORDER — TOPIRAMATE 25 MG/1
50 TABLET ORAL 2 TIMES DAILY
Status: DISCONTINUED | OUTPATIENT
Start: 2022-12-08 | End: 2022-12-08 | Stop reason: HOSPADM

## 2022-12-08 RX ORDER — FAMOTIDINE 20 MG/1
20 TABLET, FILM COATED ORAL 2 TIMES DAILY
Status: DISCONTINUED | OUTPATIENT
Start: 2022-12-09 | End: 2022-12-16 | Stop reason: HOSPADM

## 2022-12-08 RX ORDER — FAMOTIDINE 20 MG/1
20 TABLET, FILM COATED ORAL 2 TIMES DAILY
Status: CANCELLED | OUTPATIENT
Start: 2022-12-08

## 2022-12-08 RX ORDER — LORAZEPAM 2 MG/ML
1 INJECTION INTRAMUSCULAR
Status: CANCELLED | OUTPATIENT
Start: 2022-12-08

## 2022-12-08 RX ORDER — HYDROXYZINE 50 MG/1
25 TABLET, FILM COATED ORAL
Status: CANCELLED | OUTPATIENT
Start: 2022-12-08

## 2022-12-08 RX ORDER — LANOLIN ALCOHOL/MO/W.PET/CERES
3 CREAM (GRAM) TOPICAL
Status: CANCELLED | OUTPATIENT
Start: 2022-12-08

## 2022-12-08 RX ORDER — LORAZEPAM 2 MG/ML
1 INJECTION INTRAMUSCULAR
Status: DISCONTINUED | OUTPATIENT
Start: 2022-12-08 | End: 2022-12-16 | Stop reason: HOSPADM

## 2022-12-08 RX ORDER — RISPERIDONE 0.5 MG/1
0.5 TABLET ORAL
Status: DISCONTINUED | OUTPATIENT
Start: 2022-12-08 | End: 2022-12-16 | Stop reason: HOSPADM

## 2022-12-08 RX ORDER — HALOPERIDOL 5 MG/ML
5 INJECTION INTRAMUSCULAR
Status: CANCELLED | OUTPATIENT
Start: 2022-12-08

## 2022-12-08 RX ORDER — AMLODIPINE BESYLATE 5 MG/1
5 TABLET ORAL DAILY
Status: CANCELLED | OUTPATIENT
Start: 2022-12-09

## 2022-12-08 RX ORDER — AMLODIPINE BESYLATE 5 MG/1
5 TABLET ORAL DAILY
Status: DISCONTINUED | OUTPATIENT
Start: 2022-12-09 | End: 2022-12-16 | Stop reason: HOSPADM

## 2022-12-08 RX ORDER — TRAZODONE HYDROCHLORIDE 100 MG/1
100 TABLET ORAL
Status: DISCONTINUED | OUTPATIENT
Start: 2022-12-09 | End: 2022-12-16 | Stop reason: HOSPADM

## 2022-12-08 RX ORDER — HYDROXYZINE HYDROCHLORIDE 25 MG/1
25 TABLET, FILM COATED ORAL
Status: DISCONTINUED | OUTPATIENT
Start: 2022-12-08 | End: 2022-12-16 | Stop reason: HOSPADM

## 2022-12-08 RX ORDER — AMLODIPINE BESYLATE 5 MG/1
5 TABLET ORAL DAILY
Status: DISCONTINUED | OUTPATIENT
Start: 2022-12-08 | End: 2022-12-08 | Stop reason: HOSPADM

## 2022-12-08 RX ORDER — QUETIAPINE FUMARATE 25 MG/1
50 TABLET, FILM COATED ORAL
Status: DISCONTINUED | OUTPATIENT
Start: 2022-12-08 | End: 2022-12-08 | Stop reason: HOSPADM

## 2022-12-08 RX ORDER — LANOLIN ALCOHOL/MO/W.PET/CERES
3 CREAM (GRAM) TOPICAL
Status: DISCONTINUED | OUTPATIENT
Start: 2022-12-09 | End: 2022-12-16 | Stop reason: HOSPADM

## 2022-12-08 RX ORDER — ROSUVASTATIN CALCIUM 10 MG/1
5 TABLET, COATED ORAL DAILY
Status: DISCONTINUED | OUTPATIENT
Start: 2022-12-08 | End: 2022-12-08 | Stop reason: HOSPADM

## 2022-12-08 RX ORDER — IBUPROFEN 200 MG
600 TABLET ORAL EVERY 6 HOURS PRN
Status: DISCONTINUED | OUTPATIENT
Start: 2022-12-08 | End: 2022-12-08 | Stop reason: HOSPADM

## 2022-12-08 RX ORDER — MAGNESIUM HYDROXIDE/ALUMINUM HYDROXICE/SIMETHICONE 120; 1200; 1200 MG/30ML; MG/30ML; MG/30ML
30 SUSPENSION ORAL EVERY 4 HOURS PRN
Status: CANCELLED | OUTPATIENT
Start: 2022-12-08

## 2022-12-08 RX ORDER — RISPERIDONE 0.25 MG/1
0.25 TABLET ORAL
Status: DISCONTINUED | OUTPATIENT
Start: 2022-12-08 | End: 2022-12-16 | Stop reason: HOSPADM

## 2022-12-08 RX ORDER — FAMOTIDINE 20 MG/1
20 TABLET, FILM COATED ORAL 2 TIMES DAILY
Status: DISCONTINUED | OUTPATIENT
Start: 2022-12-08 | End: 2022-12-08 | Stop reason: HOSPADM

## 2022-12-08 RX ORDER — TRAZODONE HYDROCHLORIDE 50 MG/1
100 TABLET ORAL
Status: DISCONTINUED | OUTPATIENT
Start: 2022-12-08 | End: 2022-12-08 | Stop reason: HOSPADM

## 2022-12-08 RX ORDER — TOPIRAMATE 25 MG/1
50 TABLET ORAL 2 TIMES DAILY
Status: CANCELLED | OUTPATIENT
Start: 2022-12-08

## 2022-12-08 RX ORDER — LORAZEPAM 1 MG/1
1 TABLET ORAL ONCE
Status: DISCONTINUED | OUTPATIENT
Start: 2022-12-08 | End: 2022-12-08 | Stop reason: HOSPADM

## 2022-12-08 RX ORDER — PANTOPRAZOLE SODIUM 40 MG/1
40 TABLET, DELAYED RELEASE ORAL
Status: DISCONTINUED | OUTPATIENT
Start: 2022-12-08 | End: 2022-12-08 | Stop reason: HOSPADM

## 2022-12-08 RX ORDER — QUETIAPINE FUMARATE 50 MG/1
50 TABLET, FILM COATED ORAL
Status: DISCONTINUED | OUTPATIENT
Start: 2022-12-09 | End: 2022-12-09

## 2022-12-08 RX ORDER — CLONAZEPAM 0.5 MG/1
0.5 TABLET ORAL 2 TIMES DAILY PRN
Status: DISCONTINUED | OUTPATIENT
Start: 2022-12-08 | End: 2022-12-08 | Stop reason: HOSPADM

## 2022-12-08 RX ORDER — RISPERIDONE 0.25 MG/1
0.5 TABLET ORAL
Status: CANCELLED | OUTPATIENT
Start: 2022-12-08

## 2022-12-08 RX ORDER — TOPIRAMATE 25 MG/1
50 TABLET ORAL 2 TIMES DAILY
Status: DISCONTINUED | OUTPATIENT
Start: 2022-12-09 | End: 2022-12-09

## 2022-12-08 RX ORDER — OLANZAPINE 2.5 MG/1
5 TABLET ORAL ONCE
Status: DISCONTINUED | OUTPATIENT
Start: 2022-12-08 | End: 2022-12-08 | Stop reason: HOSPADM

## 2022-12-08 RX ADMIN — LORAZEPAM 1 MG: 1 TABLET ORAL at 13:10

## 2022-12-08 NOTE — LETTER
[address] [facility] [address]  Dept: [phone]      EMTALA TRANSFER CONSENT    NAME Pebbles Amato III                                         1970                              MRN [id number]    I have been informed of my rights regarding examination, treatment, and transfer   by Dr Humble Jacobson DO    Benefits: Continuity of care    Risks: Potential for delay in receiving treatment      Consent for Transfer:  I acknowledge that my medical condition has been evaluated and explained to me by the emergency department physician or other qualified medical person and/or my attending physician, who has recommended that I be transferred to the service of  Accepting Physician: Shantal Campo at [address], [address] : [place], [place]  The above potential benefits of such transfer, the potential risks associated with such transfer, and the probable risks of not being transferred have been explained to me, and I fully understand them  The doctor has explained that, in my case, the benefits of transfer outweigh the risks  I agree to be transferred  I authorize the performance of emergency medical procedures and treatments upon me in both transit and upon arrival at the receiving facility  Additionally, I authorize the release of any and all medical records to the receiving facility and request they be transported with me, if possible  I understand that the safest mode of transportation during a medical emergency is an ambulance and that the Hospital advocates the use of this mode of transport  Risks of traveling to the receiving facility by car, including absence of medical control, life sustaining equipment, such as oxygen, and medical personnel has been explained to me and I fully understand them  (JESUS CORRECT BOX BELOW)  [ X ]  I consent to the stated transfer and to be transported by ambulance/helicopter    [  ]  I consent to the stated transfer, but refuse transportation by ambulance and accept full responsibility for my transportation by car  I understand the risks of non-ambulance transfers and I exonerate the Hospital and its staff from any deterioration in my condition that results from this refusal     X___________________________________________    DATE  22  TIME________  Signature of patient or legally responsible individual signing on patient behalf           RELATIONSHIP TO PATIENT________SELF_________________                  Provider Certification    NAME Evgeny Antunez III                                         1970                              MRN [id number]    A medical screening exam was performed on the above named patient  Based on the examination:    Condition Necessitating Transfer The encounter diagnosis was Encounter for psychiatric assessment  Patient Condition: The patient has been stabilized such that within reasonable medical probability, no material deterioration of the patient condition or the condition of the unborn child(bandar) is likely to result from the transfer    Reason for Transfer: Level of Care needed not available at this facility    Transfer Requirements: Facility [place], [place]   · Space available and qualified personnel available for treatment as acknowledged by Radha Mcdowell [phone]  · Agreed to accept transfer and to provide appropriate medical treatment as acknowledged by       Gamaliel  · Appropriate medical records of the examination and treatment of the patient are provided at the time of transfer   [address] [facility], [address] __IK_____  · Transfer will be performed by qualified personnel from [address] [place] [address]  and appropriate transfer equipment as required, including the use of necessary and appropriate life support measures      Provider Certification: I have examined the patient and explained the following risks and benefits of being transferred/refusing transfer to the patient/family:  The patient is stable for psychiatric transfer because they are medically stable, and is protected from harming him/herself or others during transport      Based on these reasonable risks and benefits to the patient and/or the unborn child(bandar), and based upon the information available at the time of the patient’s examination, I certify that the medical benefits reasonably to be expected from the provision of appropriate medical treatments at another medical facility outweigh the increasing risks, if any, to the individual’s medical condition, and in the case of labor to the unborn child, from effecting the transfer      X____________________________________________ DATE 12/08/22        TIME_______      ORIGINAL - SEND TO MEDICAL RECORDS   COPY - SEND WITH PATIENT DURING TRANSFER

## 2022-12-08 NOTE — CONSULTS
TeleConsultation - [address]  [facility] 46 y o  male MRN: [id number]  Unit/Bed#: [address] Encounter: [id number]        REQUIRED DOCUMENTATION:     1  This service was provided via Telemedicine  2  Provider located at [address]  3  TeleMed provider: Bhupinder Swanson DO   4  Identify all parties in room with patient during tele consult: none  5  Patient was then informed that this was a Telemedicine visit and that the exam was being conducted confidentially over secure lines  My office door was closed  The patient was notified of the following individuals present in the room: Dr Bhupinder Swanson, Dr Boland Pac  Patient acknowledged consent and understanding of privacy and security of the Telemedicine visit, and gave us permission to have the assistant stay in the room in order to assist with the history and to conduct the exam  I informed the patient that I have reviewed their record in Epic and presented the opportunity for them to ask any questions regarding the visit today  The patient agreed to participate  Assessment/Plan     Principal Problem:    Major depressive disorder, recurrent episode, moderate (HCC)  Active Problems: Moderate alcohol use disorder (HCC)    Anxiety    Assessment:  Patient is a 45 yo male with a past psychiatric history of Major Depressive Disorder, Anxiety, and history of alcohol abuse presenting from the [facility] ED for a psych consultation  Patient reports 2 weeks of increased depression, anxiety, and alcohol consumption following recent stressors with his wife  He reports panic attacks, decreased sleep, low appetite, fatigue, and excessive daily alcohol abuse  He has a history of alcohol abuse and a previous DUI  He is currently being prescribed Seroquel, Trazodone, and Topamax by his PCP  He denies SI/HI or thoughts of self- harm  No over psychosis or history of zena  He desires inpatient psychiatric admission for depression   Patient's symptoms are consistent with Recurrent Major Depressive Disorder and Alcohol Use Disorder  Fair insight and judgement  Treatment Plan:    1) Recommend either 201 voluntary inpatient psychiatric admission or dual diagnosis program to treat symptoms of depression and manage alcohol withdrawal symptoms and history of alcohol abuse  2) Initiate [facility] protocol for alcohol withdrawal symptoms  Current Medications:     Current Facility-Administered Medications   Medication Dose Route Frequency Provider Last Rate   • amLODIPine  5 mg Oral Daily Taras Rim, DO     • clonazePAM  0 5 mg Oral BID PRN Taras Rim, DO     • famotidine  20 mg Oral BID Taras Rim, DO     • ibuprofen  600 mg Oral Q6H PRN Taras Rim, DO     • OLANZapine  5 mg Oral Once Taras Rim, DO     • pantoprazole  40 mg Oral Early Morning Taras Rim, DO     • QUEtiapine  50 mg Oral HS Taras Rim, DO     • rosuvastatin  5 mg Oral Daily Taras Rim, DO     • topiramate  50 mg Oral BID Taras Rim, DO     • traZODone  100 mg Oral HS Taras Rim, DO         Risks / Benefits of Treatment:    Risks, benefits, and possible side effects of medications explained to patient and patient verbalizes understanding  Consults  Physician Requesting Consult: Taras Rim, DO  Principal Problem:Major depressive disorder, recurrent episode, moderate ([facility] )    Reason for Consult: depressive symptoms, worsening depression, alcohol abuse and for evaluation of criteria for inpatient psych admission      History of Present Illness      Patient is a 46 y o  male with a history of Major Depressive Disorder, Anxiety, and history of Alcohol Abuse who was admitted to the [facility] ED on 12/8/2022 with reports of severe depression  Psychiatric consultation was requested to evaluate if patient meets criteria for inpatient admission      On initial consultation, patient reports 2 weeks of increased depression, anxiety, and alcohol consumption following recent stressors with his wife  He states on Oct  2nd of this year, he got into an argument with his wife regarding her gambling problem and she moved out of the house and blocked him from all modes of communication  He states [email or web address], she threatened to divorce him and sell the house  He says 2 weeks ago, his friend told him that he found his wife on a dating website with her profile stating "," which patient states caused him to feel severely depressed and start abusing alcohol again  He reports panic attacks, decreased sleep, low appetite, fatigue, and excessive daily alcohol abuse of ~20 beers/day since hearing from his friend  He has a history of alcohol abuse and a previous DUI  He denies history of withdrawal symptoms and insists that he can stop drinking if he wants at any time without difficulty  He is currently being prescribed Seroquel, Trazodone, and Topomax by his PCP  He was on Klonopin for anxiety and insomnia from his PCP, but recently stopped stating the medication did not help  He was previously tried on Abilify and Lexapro for depression  He has hx of one previous inpatient psych admission 2 years ago for depression  He denies SI/HI or thoughts of self- harm  No overt psychosis or history of zena  He desires inpatient psychiatric admission for depression  Patient's symptoms are consistent with Recurrent Major Depressive Disorder and Alcohol Use Disorder  Fair insight and judgement         Psychiatric Review Of Systems:    sleep changes: yes, decreased  weight changes: no  energy changes: yes, low  change in interest/pleasure/anhedonia: yes  somatic symptoms: no  anxiety/panic: yes, anxiety and panic attacks  zena: no  guilty/hopeless: no  self injurious behavior/risky behavior: no    Historical Information     Past Psychiatric History:     Psychiatric Hospitalizations:   • One past inpatient psychiatric admission  Outpatient Treatment History:   • past treatment with a therapist; receives psych med management from PCP currently  Suicide Attempts:   • Denies  History of self-harm:   • None  Violence History:   • no  Past Psychiatric medication trials: Klonopin, Lexapro, Abilify    Substance Abuse History:    No history of illicit substance use  Use of Alcohol: heavy how often daily, ~20 beers/day for the last 2 weeks, drank socially prior to 2 weeks    History of IP/OP rehabilitation program: None  Smoking history: 25 pack-year history, quit 5/21    Family Psychiatric History:      Unknown    Social History:    Education: high school diploma/GED  Marital history:   Children: 2  Living arrangement, social support: The patient lives in home with son  Occupational History: unknown occupation  Functioning Relationships: poor support system; son is his closest support  Other Pertinent History: Financial and Legal: DUI in 2019   Firearms: no access    Traumatic History:     Abuse: None  Other Traumatic Events: none    Past Medical History:   Diagnosis Date   • Ankylosing spondylitis of site in spine ([facility] )    • Anxiety    • Cancer ([facility] )     LUNG   • Chronic pain     BACK   • Chronic pain disorder    • Depression    • Diverticulitis of colon    • GERD (gastroesophageal reflux disease)    • History of COVID-19 01/04/2021    Mild Symptoms- Lost of taste /smell   • Hypertension    • 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 4/13/2021    Diagnosis: Right upper lobe non small cell lung carcinoma Procedure: Flexible bronchoscopy, navigational bronchoscopy, and EBUS performed on 5/11/21  Pathology: right upper lobe biopsy and brushings revealed malignancy, consistent with non small cell carcinoma  10R revealed atypical cellular changes  Levels 4R and 7 were negative for carcinoma       • Pneumonia    • Psychiatric disorder     ANXIETY   • Rectal lesion     last assessed 1/12/15       Medical Review Of Systems:    Review of Systems    Meds/Allergies     all current active meds have been reviewed and current meds:   Current Facility-Administered Medications   Medication Dose Route Frequency   • amLODIPine (NORVASC) tablet 5 mg  5 mg Oral Daily   • clonazePAM (KlonoPIN) tablet 0 5 mg  0 5 mg Oral BID PRN   • famotidine (PEPCID) tablet 20 mg  20 mg Oral BID   • ibuprofen (MOTRIN) tablet 600 mg  600 mg Oral Q6H PRN   • OLANZapine (ZyPREXA) tablet 5 mg  5 mg Oral Once   • pantoprazole (PROTONIX) EC tablet 40 mg  40 mg Oral Early Morning   • QUEtiapine (SEROquel) tablet 50 mg  50 mg Oral HS   • rosuvastatin (CRESTOR) tablet 5 mg  5 mg Oral Daily   • topiramate (TOPAMAX) tablet 50 mg  50 mg Oral BID   • traZODone (DESYREL) tablet 100 mg  100 mg Oral HS     No Known Allergies    Objective     Vital signs in last 24 hours:  Temp:  [98 3 °F (36 8 °C)] 98 3 °F (36 8 °C)  HR:  [87] 87  Resp:  [16] 16  BP: (119)/(73) 119/73    No intake or output data in the 24 hours ending 12/08/22 1534    Mental Status Evaluation:    Appearance:  age appropriate, bearded, casually dressed and well-groomed   Behavior:  cooperative   Speech:  averate rate and volume, fluent   Mood:  euthymic and stable   Affect:  stable, full, appropriate   Thought Process:  logical and linear   Associations intact associations   Thought Content:  no overt delusions   Perceptual Disturbances: None   Risk Potential: Suicidal Ideations none  Homicidal Ideations none  Potential for Aggression No   Sensorium:  person, place, time/date, and situation   Cognition:  recent and remote memory grossly intact   Consciousness:  alert and awake    Attention: attention span and concentration were age appropriate   Intellect: within normal limits   Insight:  fair   Judgment: fair   Gait/Station: normal gait/station   Motor Activity: no abnormal movements       Lab Results: I have personally reviewed all pertinent laboratory/tests results       Most Recent Labs: Lab Results   Component Value Date    WBC 5 98 11/19/2022    RBC 4 84 11/19/2022    HGB 15 6 11/19/2022    HCT 45 4 11/19/2022     11/19/2022    RDW 13 0 11/19/2022    NEUTROABS 4 02 11/19/2022    SODIUM 138 11/19/2022    K 4 0 11/19/2022     11/19/2022    CO2 27 11/19/2022    BUN 12 11/19/2022    CREATININE 0 95 11/19/2022    GLUC 88 11/19/2022    GLUF 81 03/05/2019    CALCIUM 9 0 11/19/2022    AST 25 06/19/2022    ALT 36 06/19/2022    ALKPHOS 99 06/19/2022    TP 7 9 06/19/2022    ALB 4 4 06/19/2022    TBILI 0 84 06/19/2022    CHOLESTEROL 181 03/05/2019    HDL 45 03/05/2019    TRIG 89 03/05/2019    LDLCALC 118 (H) 03/05/2019    NONHDLC 136 03/05/2019    ZOT3YDIZIQCQ 2 818 08/10/2021    HGBA1C 6 5 10/27/2022     02/09/2016       Imaging Studies: CT facial bones w contrast    Result Date: 11/19/2022  Narrative: CT FACIAL SOFT TISSUES WITH INTRAVENOUS CONTRAST INDICATION:   Severe right jaw pain radiating to neck and behind ear  COMPARISON: CT sinus without contrast March 8, 2019  TECHNIQUE:  Axial images through the facial soft tissues were performed  Reformatted images were created in multiple planes  Radiation dose length product (DLP) for this visit:  392 45 mGy-cm   This examination, like all CT scans performed in the [facility], was performed utilizing techniques to minimize radiation dose exposure, including the use of iterative  reconstruction and automated exposure control  IV Contrast:  85 mL of iohexol (OMNIPAQUE) IMAGE QUALITY:  Diagnostic  FINDINGS: SOFT TISSUES: Asymmetric enlargement of right parotid gland with slightly hyperdense appearance and mild surrounding inflammatory changes in right parotid space, right  space, subcutaneous tissues of right perimandibular region with associated right platysmal thickening  No abscess formation  No hematoma  DENTITION: Normal dentition  FACIAL BONES: There is no facial bone fracture identified    No discrete lytic or blastic lesion  No destructive lesions  ORBITS: Normal globes  Preseptal and retrobulbar soft tissues are unremarkable  SINUSES: The paranasal sinuses are clear  Impression: Acute right parotitis with inflammatory changes involving right parotid space, right  space, and subcutaneous tissues of right perimandibular region  The study was marked in [facility] for immediate notification  Workstation performed: [id number]     EKG/Pathology/Other Studies:   Lab Results   Component Value Date    VENTRATE 65 06/19/2022    ATRIALRATE 65 06/19/2022    PRINT 146 06/19/2022    QRSDINT 144 06/19/2022    QTINT 432 06/19/2022    QTCINT 449 06/19/2022    PAXIS 86 06/19/2022    QRSAXIS -45 06/19/2022    TWAVEAXIS 52 06/19/2022        Code Status: Prior  Advance Directive and Living Will:      Power of :    POLST:      Counseling / Coordination of Care: Total floor / unit time spent today 30  minutes  Greater than 50% of total time was spent with the patient and / or family counseling and / or coordination of care

## 2022-12-08 NOTE — ED NOTES
Crisis met with pt and discussed treatment recommendation from Dr Rut Torres who recommended dual inpatient treatment  Crisis reviewed 12 with pt  Pt signed 201 after rights and 72 hours were discussed and reviewed

## 2022-12-08 NOTE — ED NOTES
Crisis met with pt to complete Crisis Intake and Safety Risk Assessment  Pt is a 46year old male, with no prior mental health history  He presents in ED with severe Depression  Pt stated his wife of 31 years, has an online gambling problem, up and moved out of the house  Today, he received a call from a friend stating that his wife was on a dating site that stated she is   Pt said since that time he has not been able to eat a full meal, or sleep  Pt stated he is diagnosed with Cancer,and this news has taken a toll on his immune system  He denies suicidal, homicidal ideations, auditory, visual hallucinations or thoughts to self harm  Pt stated his primary physician recommends inpatient  Crisis recommends tele psych consult
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
## 2022-12-08 NOTE — ED NOTES
[facility] contacted for a tele psych consult  Spoke with Kasia Enciso and provided demographic information  Waiting response from [facility]

## 2022-12-09 LAB
25(OH)D3 SERPL-MCNC: 26.9 NG/ML (ref 30–100)
ANION GAP SERPL CALCULATED.3IONS-SCNC: 8 MMOL/L (ref 4–13)
ATRIAL RATE: 78 BPM
BASOPHILS # BLD AUTO: 0.07 THOUSANDS/ÂΜL (ref 0–0.1)
BASOPHILS NFR BLD AUTO: 1 % (ref 0–1)
BUN SERPL-MCNC: 15 MG/DL (ref 5–25)
CALCIUM SERPL-MCNC: 9.3 MG/DL (ref 8.4–10.2)
CHLORIDE SERPL-SCNC: 105 MMOL/L (ref 96–108)
CHOLEST SERPL-MCNC: 180 MG/DL
CO2 SERPL-SCNC: 27 MMOL/L (ref 21–32)
CREAT SERPL-MCNC: 0.84 MG/DL (ref 0.6–1.3)
EOSINOPHIL # BLD AUTO: 0.14 THOUSAND/ÂΜL (ref 0–0.61)
EOSINOPHIL NFR BLD AUTO: 3 % (ref 0–6)
ERYTHROCYTE [DISTWIDTH] IN BLOOD BY AUTOMATED COUNT: 13.3 % (ref 11.6–15.1)
FOLATE SERPL-MCNC: >20 NG/ML (ref 3.1–17.5)
GFR SERPL CREATININE-BSD FRML MDRD: 99 ML/MIN/1.73SQ M
GLUCOSE P FAST SERPL-MCNC: 87 MG/DL (ref 65–99)
GLUCOSE SERPL-MCNC: 87 MG/DL (ref 65–140)
HCT VFR BLD AUTO: 44.8 % (ref 36.5–49.3)
HDLC SERPL-MCNC: 65 MG/DL
HGB BLD-MCNC: 15 G/DL (ref 12–17)
IMM GRANULOCYTES # BLD AUTO: 0.01 THOUSAND/UL (ref 0–0.2)
IMM GRANULOCYTES NFR BLD AUTO: 0 % (ref 0–2)
LDLC SERPL CALC-MCNC: 91 MG/DL (ref 0–100)
LYMPHOCYTES # BLD AUTO: 1.14 THOUSANDS/ÂΜL (ref 0.6–4.47)
LYMPHOCYTES NFR BLD AUTO: 23 % (ref 14–44)
MCH RBC QN AUTO: 32.3 PG (ref 26.8–34.3)
MCHC RBC AUTO-ENTMCNC: 33.5 G/DL (ref 31.4–37.4)
MCV RBC AUTO: 97 FL (ref 82–98)
MONOCYTES # BLD AUTO: 0.61 THOUSAND/ÂΜL (ref 0.17–1.22)
MONOCYTES NFR BLD AUTO: 12 % (ref 4–12)
NEUTROPHILS # BLD AUTO: 3.09 THOUSANDS/ÂΜL (ref 1.85–7.62)
NEUTS SEG NFR BLD AUTO: 61 % (ref 43–75)
NONHDLC SERPL-MCNC: 115 MG/DL
NRBC BLD AUTO-RTO: 0 /100 WBCS
P AXIS: 69 DEGREES
PLATELET # BLD AUTO: 264 THOUSANDS/UL (ref 149–390)
PMV BLD AUTO: 8.8 FL (ref 8.9–12.7)
POTASSIUM SERPL-SCNC: 3.5 MMOL/L (ref 3.5–5.3)
PR INTERVAL: 142 MS
QRS AXIS: -55 DEGREES
QRSD INTERVAL: 148 MS
QT INTERVAL: 406 MS
QTC INTERVAL: 462 MS
RBC # BLD AUTO: 4.64 MILLION/UL (ref 3.88–5.62)
RPR SER QL: NORMAL
SODIUM SERPL-SCNC: 140 MMOL/L (ref 135–147)
T WAVE AXIS: 36 DEGREES
TRIGL SERPL-MCNC: 122 MG/DL
VENTRICULAR RATE: 78 BPM
VIT B12 SERPL-MCNC: 1088 PG/ML (ref 100–900)
WBC # BLD AUTO: 5.06 THOUSAND/UL (ref 4.31–10.16)

## 2022-12-09 RX ORDER — LANOLIN ALCOHOL/MO/W.PET/CERES
100 CREAM (GRAM) TOPICAL DAILY
Status: DISCONTINUED | OUTPATIENT
Start: 2022-12-10 | End: 2022-12-16 | Stop reason: HOSPADM

## 2022-12-09 RX ORDER — PRAVASTATIN SODIUM 40 MG
40 TABLET ORAL
Status: DISCONTINUED | OUTPATIENT
Start: 2022-12-09 | End: 2022-12-16 | Stop reason: HOSPADM

## 2022-12-09 RX ORDER — LORAZEPAM 1 MG/1
1 TABLET ORAL EVERY 8 HOURS PRN
Status: DISCONTINUED | OUTPATIENT
Start: 2022-12-09 | End: 2022-12-16 | Stop reason: HOSPADM

## 2022-12-09 RX ORDER — LORAZEPAM 1 MG/1
1 TABLET ORAL 2 TIMES DAILY PRN
Status: DISCONTINUED | OUTPATIENT
Start: 2022-12-09 | End: 2022-12-09

## 2022-12-09 RX ORDER — FOLIC ACID 1 MG/1
1 TABLET ORAL DAILY
Status: DISCONTINUED | OUTPATIENT
Start: 2022-12-10 | End: 2022-12-16 | Stop reason: HOSPADM

## 2022-12-09 RX ORDER — TOPIRAMATE 100 MG/1
100 TABLET, FILM COATED ORAL 2 TIMES DAILY
Status: DISCONTINUED | OUTPATIENT
Start: 2022-12-09 | End: 2022-12-16 | Stop reason: HOSPADM

## 2022-12-09 RX ORDER — QUETIAPINE FUMARATE 50 MG/1
50 TABLET, FILM COATED ORAL
Status: DISCONTINUED | OUTPATIENT
Start: 2022-12-09 | End: 2022-12-10

## 2022-12-09 RX ORDER — QUETIAPINE FUMARATE 100 MG/1
100 TABLET, FILM COATED ORAL
Status: DISCONTINUED | OUTPATIENT
Start: 2022-12-09 | End: 2022-12-09

## 2022-12-09 RX ADMIN — Medication 3 MG: at 00:48

## 2022-12-09 RX ADMIN — LORAZEPAM 1 MG: 1 TABLET ORAL at 00:48

## 2022-12-09 RX ADMIN — Medication 3 MG: at 20:59

## 2022-12-09 RX ADMIN — TRAZODONE HYDROCHLORIDE 100 MG: 100 TABLET ORAL at 00:48

## 2022-12-09 RX ADMIN — LORAZEPAM 1 MG: 1 TABLET ORAL at 21:00

## 2022-12-09 RX ADMIN — TRAZODONE HYDROCHLORIDE 100 MG: 100 TABLET ORAL at 20:58

## 2022-12-09 RX ADMIN — TOPIRAMATE 50 MG: 25 TABLET, FILM COATED ORAL at 08:31

## 2022-12-09 RX ADMIN — QUETIAPINE FUMARATE 50 MG: 50 TABLET ORAL at 00:48

## 2022-12-09 RX ADMIN — TOPIRAMATE 100 MG: 100 TABLET, FILM COATED ORAL at 17:28

## 2022-12-09 RX ADMIN — PRAVASTATIN SODIUM 40 MG: 40 TABLET ORAL at 16:30

## 2022-12-09 RX ADMIN — QUETIAPINE FUMARATE 50 MG: 50 TABLET ORAL at 20:59

## 2022-12-09 RX ADMIN — AMLODIPINE BESYLATE 5 MG: 5 TABLET ORAL at 08:31

## 2022-12-09 RX ADMIN — LORAZEPAM 1 MG: 1 TABLET ORAL at 08:58

## 2022-12-09 RX ADMIN — HYDROXYZINE HYDROCHLORIDE 25 MG: 25 TABLET, FILM COATED ORAL at 16:30

## 2022-12-09 RX ADMIN — FAMOTIDINE 20 MG: 20 TABLET, FILM COATED ORAL at 08:31

## 2022-12-09 RX ADMIN — FAMOTIDINE 20 MG: 20 TABLET, FILM COATED ORAL at 17:27

## 2022-12-09 NOTE — NURSING NOTE
Pt up ad nelsy & gait is steady  Pt c/o depression 9/10 & anxiety 9/10  Pt is quiet & keeps to himself  Pt is cooperative & compliant with medications  Pt denies any hallucinations, suicidal or homicidal ideations  Q 7 min checks maintained to monitor pt's behavior & safety
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
## 2022-12-09 NOTE — PROGRESS NOTES
AT BEDSIDE:  Upper partials  Slippers  Jeans x3  Pull on pant  Long sleeve shirt x2  Short sleeve shirt x3  Socks x4  underware x4    BEDSIDE CABINET:  Bag  Coat  Hat  [employer] x2  Long sleeve shirt x3  sweatpants w/string  pj pants w/string  underware x3  Socks x3    CONTRABAND:  Cell phone    SAFE: [id number]  [address] x2  OTC prescription  Insurance  ID card  $3 00= 3 single $1 dollar bills

## 2022-12-09 NOTE — PROGRESS NOTES
12/09/22 8243   Activity/Group Checklist   Group Community meeting   Attendance Attended   Attendance Duration (min) 46-60   Interactions Interacted appropriately   Affect/Mood Appropriate;Blunted/flat   Goals Achieved Identified feelings; Able to listen to others; Able to engage in interactions; Able to self-disclose; Able to recieve feedback

## 2022-12-09 NOTE — PROGRESS NOTES
12/09/22   Team Meeting   Meeting Type Daily Rounds   Team Members Present   Team Members Present Nurse;Physician;; Occupational Therapist   Physician Team Member Dr Salazar Holcomb MD; [address]   Nursing Team Member Shlomo HOWE; Nilton Austin RN, [facility]   Social Work Team Member [facility]   OT Team Member Cedric Baird CTRS   Patient/Family Present   Patient Present No   Patient's Family Present No     New admit  201  Alcohol abuse  Pt pleasant  Reported panic attacks  Dep and anx 9/10  Ativan given  complains of pain/discomfort

## 2022-12-09 NOTE — CASE MANAGEMENT
CM placed phone call to patient's daughter, Mal, whom the patient listed as preferred contact  VM message left with applicable staff and patient phone numbers for return phone call needs

## 2022-12-09 NOTE — PROGRESS NOTES
12/09/22 1300   Activity/Group Checklist   Group   (Creative Expression Art Therapy)   Attendance Attended   Attendance Duration (min) 31-45   Interactions Interacted appropriately   Affect/Mood Appropriate;Blunted/flat;Calm   Goals Achieved Identified feelings; Discussed coping strategies; Able to listen to others; Able to engage in interactions; Able to reflect/comment on own behavior

## 2022-12-09 NOTE — TREATMENT PLAN
TREATMENT PLAN REVIEW - [address] J Love III 46 y o  1970 male MRN: [id number]    [address]  Room / Bed: [place] [address]/-34 Encounter: [id number]          Admit Date/Time:  12/8/2022 11:54 PM    Treatment Team: Attending Provider: Kayley Valladares MD; Certified Nursing Assistant: Darlene Morrison; Recreational Therapist: Leon Diaz; Care Manager: Franklin Peters RN; Patient Care Assistant: Amalia Mariee; Certified Nursing Assistant: Sue Kirkland; Registered Nurse: Jayjay Bates, RN; Registered Nurse: Guzman Mora RN; : Claudia Lo    Diagnosis: Principal Problem:    MDD (major depressive disorder), recurrent episode, severe ([facility] )  Active Problems:    HLA B27 positive    Anxiety    Hyperlipidemia    Hypertension    Gastroesophageal reflux disease    History of colon polyps    Centrilobular emphysema ([facility] )    Ankylosing spondylitis of cervical region [facility])    Pneumothorax on right    Primary adenocarcinoma of upper lobe of right lung ([facility] )    Moderate alcohol use disorder ([facility] )    Patient Strengths/Assets: cooperative, negotiates basic needs, patient is on a voluntary commitment    Patient Barriers/Limitations: marital/family conflict, self-care deficit, substance abuse    Short Term Goals: decrease in depressive symptoms, decrease in anxiety symptoms, decrease in suicidal thoughts, ability to stay safe on the unit, improvement in reasoning ability, improvement in self care, sleep improvement, improvement in appetite, mood stabilization, acceptance of need for psychiatric treatment, acceptance of psychiatric medications    Long Term Goals: improvement in depression, improvement in anxiety, stabilization of mood, free of suicidal thoughts, improvement in reasoning ability, improved insight, acceptance of need for psychiatric medications, acceptance of need for psychiatric treatment, acceptance of need for psychiatric follow up after discharge, adequate self care, adequate sleep, adequate appetite, adequate oral intake, appropriate interaction with peers, stable living arrangements upon discharge    Progress Towards Goals: starting psychiatric medications as prescribed    Recommended Treatment: medication management, patient medication education, group therapy, milieu therapy, continued Behavioral Health psychiatric evaluation/assessment process, medical follow up with medical team    Treatment Frequency: daily medication monitoring, group and milieu therapy daily, monitoring through interdisciplinary rounds, monitoring through weekly patient care conferences    Expected Discharge Date: 7 midnights     Discharge Plan: will be determined    Treatment Plan Created/Updated By: Obey King MD

## 2022-12-09 NOTE — NURSING NOTE
Pt medicated for c/o increased anxiety @ 0858 with Ativan 1 mg po as ordered by MD with moderate relief obtained  Algis Feeler - 20 @ this time  Q 7 min checks maintained to monitor pt's behavior & safety

## 2022-12-09 NOTE — PLAN OF CARE
Problem: DISCHARGE PLANNING  Goal: Discharge to home or other facility with appropriate resources  Outcome: Progressing  Patient is new to the unit; 201; Initial goals added
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
## 2022-12-09 NOTE — ED NOTES
Crisis attempted to contact the pt insurance  As of this time, Medicare offices are closed  The offices will open at 8:30 AM  Insurance will have to be contacted at that time   The phone number is [phone]

## 2022-12-09 NOTE — ED NOTES
Patient is accepted at [facility]  Patient is accepted by Dr Hyde Feeling per Matthew/Intake  Transportation is arranged with pending  Transportation is scheduled for pending  Patient may go to the floor at anytime  Nurse report is to be called to [phone] prior to patient transfer

## 2022-12-09 NOTE — ED NOTES
Insurance Authorization for admission:   Phone call placed to [facility] [place]  Phone number: [phone]  Spoke to Judit Conway  4 days approved  LCD 12/11/22  Level of care: Inpatient  Review on 12/12/22  Authorization # [id number]          Ur to fax clinical on 12/12 for concurrent review

## 2022-12-09 NOTE — PLAN OF CARE
Problem: Ineffective Coping  Goal: Cooperates with admission process  Description: Interventions:   - Complete admission process  Outcome: Completed    New admission as of 12/8/2022 at [address] [place] [address]

## 2022-12-09 NOTE — PROGRESS NOTES
12/09/22 1212   Team Meeting   Meeting Type Tx Team Meeting   Initial Conference Date 12/09/22   Next Conference Date 01/09/23   Team Members Present   Team Members Present Physician;Nurse;   Physician Team Member Dr Matt Egan Team Member Iftikhar Simon RN   Care Management Team Member Rajat New RN   Patient/Family Present   Patient Present Yes   Patient's Family Present No     Patient and treatment team met and reviewed pt strengths, limitations, coping skills, treatment plan and goals; pt agreeable and signed; copy on chart

## 2022-12-09 NOTE — PLAN OF CARE
Problem: Ineffective Coping  Goal: Participates in unit activities  Description: Interventions:  - Provide therapeutic environment   - Provide required programming   - Redirect inappropriate behaviors   Outcome: Progressing   Patient out for groups he remains engaged and interactive

## 2022-12-09 NOTE — NURSING NOTE
Pt medicated for c/o anxiety @ 1630 with Atarax 25 mg po as ordered by MD with mild relief obtained  Monserrat Brian @ that time  Q 7 min checks maintained to monitor pt's behavior & safety

## 2022-12-09 NOTE — NURSING NOTE
Patient [facility] to [facility], [address] [place] [address] [address] with a valid [address]  Physical assessment completed, no wounds or weapons noted  Refused shower (will take one in AM), no signs of infestation noted  Bill of Rights and HIPPA regulations reviewed and signed  Admission medication and diet ordered  Oriented to unit  Started on Q 7 minute safety checks  Fluids given  Personal property recording starting  Affect flat  Appears depressed  Denies any suicidal ideations  Rates depression 8/10 and anxiety 10/10  Simon Scale 28

## 2022-12-09 NOTE — NURSING NOTE
WA 7 at 0048  No further complaint of anxiety overnight  Slept well during most q 7 minute safety checks  No respiratory distress or changes in medical condition  Sleep has been sound and undisturbed  No suicidal ideations noted  Safety maintained via clutter-free environment, ID band, and given non-skid socks  Will continue to monitor

## 2022-12-09 NOTE — PROGRESS NOTES
CM met with patient to complete the Psychosocial Eval  The patient was admitted to [address], under a 201, Voluntary Commitment with report of increased depression/ anxiety, anhedonia, hopeless/helpless, report of inability to handle overwhelming feelings of loss  Stressors reported as marital separation due to ongoing conflict related to wife's gambling issues and decision to "up and leave"  Patient reported particular stress related to information about wife having posted a  status on an online dating site  Patient also reported stress related to physical limitations/concerns regarding serious medical issues, having been diagnosed with CA, removal of portion of rt lung in 2019, as well as ongoing concern re: status of existing lung masses being "monitored every 3 months " Patient also reported financial concerns considering limited disability income, complicated by significant losses as a result of his wife's gambling activities  On interview, the patient was alert, oriented, relevant, coherent and goal-directed, receptive to providing information  Stated his levels of depression/ anxiety was a level 7 out of 10; denied thoughts of self or other-directed harm; denied A/V hallucinations  Patient reported a limited past psychiatric history, with one previous AIP treatment at [facility] for 7 days in 2019  Patient stated similar stressors/marital concerns prior to that hospitalization  Reported compliance with prescribed psychiatric medications; currently on Topamax, prescribed by his PCP  Patient has not been involved in Psych OP treatment  Strengths reported as Cooperation, reasoning ability, motivation, ability to negotiate needs  Limitations: Poor physical health; limited f/u, financial issues  Coping skills: interacting with grandchildren, archery, watching TV  Patient denied past SI/attempt  denied access to firearms   Patient denied h/o abuse or family h/o mental illness, suicide/homicide/substance abuse or dementia  Patient reported a personal h/o substance abuse of alcohol;  reportedly consumes 1 case beer/week  Denied past h/o In-patient Rehab or OP services  Had been involved in [place] [address] for 1 year approx  20 years ago  Patient denies use of tobacco    Reported h/o 1 arrest for DUI in  resulting in a 's license revocation; to be reinstated in   Denied current legal issues  Patient has been  for 31 years   the past 2 months with wife having left unexpectedly following ongoing marital discord, reportedly related to her own gambling issues  Patient's sexual preference is heterosexual   Patient has 2 children with which he maintains regular contact: YungCornelius, reportedly Director of Nurses in the [place] [address] at [address] Antony Angelucci, employed as a  and currently living with the patient  Patient reported he frequently babysits his 2 grandchildren which he enjoys  Patient's father is   Patient reports 'regular' contact with his mother, living in [place]  Patient has 1 sister with whom he maintains contact  Patient lives in a home with his son; is able to return  Patient does not utilize assistive devices  Denied environmental physical barriers within the home  Patient did not report Baptism or cultural needs  Stated he is a recipient of SSDI, receiving approx  $909 00/month  Stated he did have a group home fund, no longer available related to his wife's gambling activities  Receives medical benefits through [employer]  Patient denied having a POA, AD or Guardian  He receives medical services at the office of Dr Shearer Counter  Chose to make his own f/u appt  Patient has no Psych OP providers; is agreeable to a referral for aftercare services  Patient is also agreeable to a referral for OP D&A services post d/c  Patient is agreeable to family notification to his daughter, Mal     RAYO's: Psych, PCP, D&A, Oncologist, daughter

## 2022-12-09 NOTE — PROGRESS NOTES
12/09/22 1100   Activity/Group Checklist   Group Admission/Discharge   Attendance Attended   Attendance Duration (min) 16-30   Interactions Interacted appropriately   Affect/Mood Appropriate;Blunted/flat   Goals Achieved Identified feelings; Identified triggers; Identified relapse prevention strategies; Discussed coping strategies; Able to listen to others; Able to engage in interactions; Able to reflect/comment on own behavior;Able to self-disclose; Able to recieve feedback   Patient agreeable to meeting with RT and completing self assessment and relapse prevention plan  Patient presents as flat and depressed; expressed he is here on unit due to separation with wife of 31 years  He shared that when she left, he began drinking to cope with the depression, anxiety and panic attacks  He shared that he likes being with his family and grandchildren  He has likes of the outdoors, reading, music, watching tv/movies, and enjoys watching football (cowboys/[employer] fan)  When he leaves he is hopeful that he will be able to cope with whatever is happening in his life  Patient spoke of needing new psychiatrist, therapist and does not feel he needs help with his alcohol

## 2022-12-10 PROCEDURE — GZ56ZZZ INDIVIDUAL PSYCHOTHERAPY, SUPPORTIVE: ICD-10-PCS | Performed by: PSYCHIATRY & NEUROLOGY

## 2022-12-10 PROCEDURE — GZHZZZZ GROUP PSYCHOTHERAPY: ICD-10-PCS | Performed by: PSYCHIATRY & NEUROLOGY

## 2022-12-10 RX ORDER — QUETIAPINE FUMARATE 100 MG/1
100 TABLET, FILM COATED ORAL
Status: DISCONTINUED | OUTPATIENT
Start: 2022-12-10 | End: 2022-12-16 | Stop reason: HOSPADM

## 2022-12-10 RX ORDER — NALTREXONE HYDROCHLORIDE 50 MG/1
25 TABLET, FILM COATED ORAL DAILY
Status: DISCONTINUED | OUTPATIENT
Start: 2022-12-11 | End: 2022-12-11

## 2022-12-10 RX ORDER — ERGOCALCIFEROL 1.25 MG/1
50000 CAPSULE ORAL WEEKLY
Status: DISCONTINUED | OUTPATIENT
Start: 2022-12-10 | End: 2022-12-16 | Stop reason: HOSPADM

## 2022-12-10 RX ORDER — MELATONIN
1000 DAILY
Status: DISCONTINUED | OUTPATIENT
Start: 2023-01-28 | End: 2022-12-16 | Stop reason: HOSPADM

## 2022-12-10 RX ADMIN — LORAZEPAM 1 MG: 1 TABLET ORAL at 07:40

## 2022-12-10 RX ADMIN — RISPERIDONE 1 MG: 1 TABLET ORAL at 12:18

## 2022-12-10 RX ADMIN — AMLODIPINE BESYLATE 5 MG: 5 TABLET ORAL at 08:56

## 2022-12-10 RX ADMIN — QUETIAPINE FUMARATE 100 MG: 100 TABLET ORAL at 21:03

## 2022-12-10 RX ADMIN — Medication 3 MG: at 21:03

## 2022-12-10 RX ADMIN — FAMOTIDINE 20 MG: 20 TABLET, FILM COATED ORAL at 17:56

## 2022-12-10 RX ADMIN — FAMOTIDINE 20 MG: 20 TABLET, FILM COATED ORAL at 08:56

## 2022-12-10 RX ADMIN — FOLIC ACID 1 MG: 1 TABLET ORAL at 08:57

## 2022-12-10 RX ADMIN — THIAMINE HCL TAB 100 MG 100 MG: 100 TAB at 08:57

## 2022-12-10 RX ADMIN — PRAVASTATIN SODIUM 40 MG: 40 TABLET ORAL at 16:26

## 2022-12-10 RX ADMIN — TOPIRAMATE 100 MG: 100 TABLET, FILM COATED ORAL at 17:56

## 2022-12-10 RX ADMIN — LORAZEPAM 1 MG: 1 TABLET ORAL at 16:26

## 2022-12-10 RX ADMIN — TRAZODONE HYDROCHLORIDE 100 MG: 100 TABLET ORAL at 21:03

## 2022-12-10 RX ADMIN — ERGOCALCIFEROL 50000 UNITS: 1.25 CAPSULE ORAL at 16:26

## 2022-12-10 RX ADMIN — TOPIRAMATE 100 MG: 100 TABLET, FILM COATED ORAL at 08:56

## 2022-12-10 RX ADMIN — Medication 1 TABLET: at 08:57

## 2022-12-10 NOTE — NURSING NOTE
Pt medicated for c/o increased anxiety @ 0740 with Ativan 1 mg po with moderate relief obtained  Rosendo Devis - 20 @ this time  Q 7 min checks maintained to monitor pt's behavior & safety

## 2022-12-10 NOTE — NURSING NOTE
Pt medicated for c/o increased anxiety & restlessness @ 1218 with Risperidone 1 mg po as ordered by MD with moderate relief obtained  Simon - 21 & Agitation - 24 @ this time  Pt states" I just found out that my wife filed for divorce & is selling the house"  Q 7 min checks maintained to monitor pt's behavior & safety

## 2022-12-10 NOTE — NURSING NOTE
Pt medicated for c/o increased anxiety @ 1626 with Ativan 1 mg po with moderate relief obtained  Melly Mac @ that time  Q 7 min checks maintained to monitor pt's behavior & safety

## 2022-12-10 NOTE — PROGRESS NOTES
Progress Note - Behavioral Health   This note was not shared with the patient due to reasonable likelihood of causing patient harm  Krystne [place] III 46 y o  male MRN: [id number]   Unit/Bed#: [facility] 209-02 Encounter: [id number]    Behavior over the last 24 hours: unchanged  Prakash Bowden continues anxious, depressed and uses Ativan as needed regularly  Still shows possible mild withdrawal symptoms but denies tremors, sweating  Processing relationship problems with wife appropriately  Meds adherence has been stable  Sleep: slept off and on  Appetite: normal  Medication side effects: denies  ROS: no complaints, all other systems are negative    Mental Status Evaluation:    Appearance:  disheveled   Behavior:  cooperative   Speech:  normal rate and volume   Mood:  depressed, anxious   Affect:  constricted   Thought Process:  goal directed   Associations: intact associations   Thought Content:  no overt delusions   Perceptual Disturbances: none   Risk Potential: Suicidal ideation - Yes, passive death wish  Homicidal ideation - None   Sensorium:  oriented to person, place and time/date   Memory:  recent and remote memory grossly intact   Consciousness:  alert and awake   Attention: attention span and concentration are age appropriate   Insight:  limited   Judgment: limited   Gait/Station: normal gait/station   Motor Activity: no abnormal movements     Vital signs in last 24 hours:    Temp:  [97 9 °F (36 6 °C)-98 5 °F (36 9 °C)] 97 9 °F (36 6 °C)  HR:  [59-77] 59  Resp:  [18] 18  BP: (124-127)/(72-82) 124/72    Laboratory results:   I have personally reviewed all pertinent laboratory/tests results    Most Recent Labs:   Lab Results   Component Value Date    WBC 5 06 12/09/2022    RBC 4 64 12/09/2022    HGB 15 0 12/09/2022    HCT 44 8 12/09/2022     12/09/2022    RDW 13 3 12/09/2022    NEUTROABS 3 09 12/09/2022    SODIUM 140 12/09/2022    K 3 5 12/09/2022     12/09/2022    CO2 27 12/09/2022    BUN 15 12/09/2022 CREATININE 0 84 12/09/2022    GLUC 87 12/09/2022    GLUF 87 12/09/2022    CALCIUM 9 3 12/09/2022    AST 25 06/19/2022    ALT 36 06/19/2022    ALKPHOS 99 06/19/2022    TP 7 9 06/19/2022    ALB 4 4 06/19/2022    TBILI 0 84 06/19/2022    CHOLESTEROL 180 12/09/2022    HDL 65 12/09/2022    TRIG 122 12/09/2022    LDLCALC 91 12/09/2022    NONHDLC 115 12/09/2022    [id number] 2 818 08/10/2021    RPR Non-Reactive 12/09/2022    HGBA1C 6 5 10/27/2022     02/09/2016       Assessment/Plan   Principal Problem:    MDD (major depressive disorder), recurrent episode, severe (HCC)  Active Problems:    HLA B27 positive    Anxiety    Hyperlipidemia    Hypertension    Gastroesophageal reflux disease    History of colon polyps    Centrilobular emphysema (HCC)    Ankylosing spondylitis of cervical region ([facility] [address] )    Pneumothorax on right    Primary adenocarcinoma of upper lobe of right lung (HCC)    Moderate alcohol use disorder ([facility] [address] )    Recommended Treatment:     Planned medication and treatment changes: All current active medications have been reviewed  Encourage group therapy, milieu therapy and occupational therapy  Behavioral Health checks every 7 minutes   Increase Seroquel to 100 mg po hs   Begin Naltrexone 25 mg po daily    Current Facility-Administered Medications   Medication Dose Route Frequency Provider Last Rate   • aluminum-magnesium hydroxide-simethicone  30 mL Oral Q4H PRN Elise Yuen MD     • amLODIPine  5 mg Oral Daily Elise Yuen MD     • famotidine  20 mg Oral BID Elise Yuen MD     • folic acid  1 mg Oral Daily Giselle De La Cruz PA-C     • haloperidol lactate  5 mg Intramuscular Q4H PRN Max 4/day Elise Yuen MD     • hydrOXYzine HCL  25 mg Oral Q6H PRN Max 4/day Elise Yuen MD     • LORazepam  1 mg Intramuscular Q6H PRN Max 3/day Elise Yuen MD     • LORazepam  1 mg Oral Q8H PRN Kayley Valladares MD     • melatonin  3 mg Oral HS Elise Yuen MD     • multivitamin-minerals  1 tablet Oral Daily Giselle De La Cruz PA-C     • nicotine polacrilex  4 mg Oral Q2H PRN Ashely Nolan MD     • pravastatin  40 mg Oral Daily With Dinner Giselle De La Cruz PA-C     • QUEtiapine  50 mg Oral HS Moe Briseno MD     • risperiDONE  0 25 mg Oral Q4H PRN Max 6/day Ashely Nolan MD     • risperiDONE  0 5 mg Oral Q4H PRN Max 3/day Ashely Nolan MD     • risperiDONE  1 mg Oral Q2H PRN Max 3/day Ashely Nolan MD     • thiamine  100 mg Oral Daily Giselle De La Cruz PA-C     • topiramate  100 mg Oral BID Moe Briseno MD     • traZODone  100 mg Oral HS Ashely Nolan MD         Risks / Benefits of Treatment:    Risks, benefits, and possible side effects of medications explained to patient and patient verbalizes understanding and agreement for treatment  Counseling / Coordination of Care:    Patient's progress discussed with staff in treatment team meeting  Medications, treatment progress and treatment plan reviewed with patient  Supportive therapy provided to patient  Group attendance encouraged      Rakesh Serrano MD 12/10/22

## 2022-12-10 NOTE — NURSING NOTE
No acute behaviors overnight  No signs or symptoms of hypo/hyperglycemia  No complaints voiced  No respiratory distress or change in medical condition  Observed sleeping during shift  Maintained on q 7 minute safety checks  Will continue to monitor

## 2022-12-10 NOTE — PLAN OF CARE

## 2022-12-10 NOTE — NURSING NOTE
EYADWA 6  Patient admitted to severe anxiety during assessment  Simon Scale 25  Given Ativan 1 mg given PO at 2100  Rated depression high  No suicidal ideations  No overt symptom of withdrawal noted (except severe anxiety)  Minimally social with peers  Out in the community  Compliant with medications and snacks  No aspiration risks noted  Fluids at bedside to promote hydration  Maintained on q 7 minute checks  Medication education given  Care Plan to be reviewed and amended  Will continue to monitor

## 2022-12-10 NOTE — NURSING NOTE
Pt is quiet & keeps to himself  Pt is cooperative & compliant with medications  Pt c/o depression 7/10 & anxiety 10/10  Pt denies any hallucinations, suicidal or homicidal ideations  Q 7 min checks maintained to monitor pt's behavior & safety  Pt up ad nelsy & gait is steady

## 2022-12-10 NOTE — PROGRESS NOTES
Progress Note - Eh Carcamo III 46 y o  male MRN: [id number]    Unit/Bed#: [facility] 209-02 Encounter: [id number]        Subjective:   Patient seen and examined at bedside after reviewing the chart and discussing the case with the caring staff  Patient examined at bedside  Patient has no acute issues  On review of patient's labs it was found that patient's vitamin D level was found to be low 26 9 but B12 level was normal 1088  Physical Exam   Vitals: Blood pressure 124/72, pulse 59, temperature 97 9 °F (36 6 °C), temperature source Temporal, resp  rate 18, height 6' (1 829 m), weight 81 6 kg (180 lb), SpO2 96 %  ,Body mass index is 24 41 kg/m²  Constitutional: He appears well-developed  HEENT: PERR, EOMI, MMM  Cardiovascular: Normal rate and regular rhythm  Pulmonary/Chest: Effort normal and breath sounds normal    Abdomen: Soft, + BS, NT    Assessment/Plan:  Eh Carcamo III is a(n) 46 y o  male with MDD      1  Cardiac with history of HTN, HLD  Patient is on amlodipine 5 mg daily  Crestor not available in hospital   Pravastatin 40 mg daily  2  GERD  Continue famotidine 20 mg twice daily, omeprazole 40 mg daily  3  Alcohol abuse  Patient started on multivitamin, thiamine, folic acid  And is also on ReVia  4  Ankylosing spondylitis  Stable  Patient reports following with outpatient rheumatology  5  DJD/OA  Patient may take Tylenol as needed  6  Primary adenocarcinoma of RUL s/p lobectomy 6/7/2021  Patient is being followed by cardiothoracic surgery  CT chest 10/3/2022 without evidence of recurrent tumor or thoracic metastatic disease  7   New vitamin D deficiency  I will put the patient on vitamin D bolus doses for 8 weeks followed by vitamin D3 1000 units daily

## 2022-12-11 RX ORDER — NALTREXONE HYDROCHLORIDE 50 MG/1
50 TABLET, FILM COATED ORAL DAILY
Status: DISCONTINUED | OUTPATIENT
Start: 2022-12-12 | End: 2022-12-16 | Stop reason: HOSPADM

## 2022-12-11 RX ADMIN — QUETIAPINE FUMARATE 100 MG: 100 TABLET ORAL at 21:21

## 2022-12-11 RX ADMIN — NALTREXONE HYDROCHLORIDE 25 MG: 50 TABLET, FILM COATED ORAL at 08:05

## 2022-12-11 RX ADMIN — THIAMINE HCL TAB 100 MG 100 MG: 100 TAB at 08:14

## 2022-12-11 RX ADMIN — FAMOTIDINE 20 MG: 20 TABLET, FILM COATED ORAL at 18:39

## 2022-12-11 RX ADMIN — PRAVASTATIN SODIUM 40 MG: 40 TABLET ORAL at 16:26

## 2022-12-11 RX ADMIN — TOPIRAMATE 100 MG: 100 TABLET, FILM COATED ORAL at 18:40

## 2022-12-11 RX ADMIN — RISPERIDONE 1 MG: 1 TABLET ORAL at 11:31

## 2022-12-11 RX ADMIN — FOLIC ACID 1 MG: 1 TABLET ORAL at 08:05

## 2022-12-11 RX ADMIN — TRAZODONE HYDROCHLORIDE 100 MG: 100 TABLET ORAL at 21:21

## 2022-12-11 RX ADMIN — LORAZEPAM 1 MG: 1 TABLET ORAL at 16:25

## 2022-12-11 RX ADMIN — AMLODIPINE BESYLATE 5 MG: 5 TABLET ORAL at 08:05

## 2022-12-11 RX ADMIN — Medication 3 MG: at 21:21

## 2022-12-11 RX ADMIN — FAMOTIDINE 20 MG: 20 TABLET, FILM COATED ORAL at 08:14

## 2022-12-11 RX ADMIN — TOPIRAMATE 100 MG: 100 TABLET, FILM COATED ORAL at 08:14

## 2022-12-11 RX ADMIN — LORAZEPAM 1 MG: 1 TABLET ORAL at 05:46

## 2022-12-11 RX ADMIN — Medication 1 TABLET: at 08:14

## 2022-12-11 NOTE — PROGRESS NOTES
Progress Note - Lucio Clifford III 45 y o  male MRN: [id number]    Unit/Bed#: [facility] 209-02 Encounter: [id number]        Subjective:   Patient seen and examined at bedside after reviewing the chart and discussing the case with the caring staff  Patient examined at bedside  Patient has no acute issues  On review of patient's labs it was found that patient's vitamin D level was found to be low 26 9 but B12 level was normal 1088  Physical Exam   Vitals: Blood pressure 119/73, pulse 87, temperature 98 2 °F (36 8 °C), temperature source Temporal, resp  rate 18, height 6' (1 829 m), weight 81 6 kg (180 lb), SpO2 99 %  ,Body mass index is 24 41 kg/m²  Constitutional: He appears well-developed  HEENT: PERR, EOMI, MMM  Cardiovascular: Normal rate and regular rhythm  Pulmonary/Chest: Effort normal and breath sounds normal    Abdomen: Soft, + BS, NT    Assessment/Plan:  Lucio Clifford III is a(n) 46 y o  male with MDD      1  Cardiac with history of HTN, HLD  Patient is on amlodipine 5 mg daily  Crestor not available in hospital   Pravastatin 40 mg daily  2  GERD  Continue famotidine 20 mg twice daily, omeprazole 40 mg daily  3  Alcohol abuse  Patient started on multivitamin, thiamine, folic acid  And is also on ReVia  4  Ankylosing spondylitis  Stable  Patient reports following with outpatient rheumatology  5  DJD/OA  Patient may take Tylenol as needed  6  Primary adenocarcinoma of RUL s/p lobectomy 6/7/2021  Patient is being followed by cardiothoracic surgery  CT chest 10/3/2022 without evidence of recurrent tumor or thoracic metastatic disease  7   New vitamin D deficiency  I will put the patient on vitamin D bolus doses for 8 weeks followed by vitamin D3 1000 units daily

## 2022-12-11 NOTE — NURSING NOTE
Pt medicated for c/o increased anxiety @ 1625 with Ativan 1 mg po as ordered by MD with moderate relief obtained  Nguyen Jaimes - 17 @ that time  Pt appears to be brighter & interacts with staff frequently this afternoon  Q 7 min checks maintained to monitor pt's behavior & safety

## 2022-12-11 NOTE — PLAN OF CARE
Problem: Ineffective Coping  Goal: Identifies ineffective coping skills  Outcome: Progressing  Goal: Identifies healthy coping skills  Outcome: Progressing  Goal: Patient/Family verbalizes awareness of resources  Outcome: Progressing     Problem: Depression  Goal: Treatment Goal: Demonstrate behavioral control of depressive symptoms, verbalize feelings of improved mood/affect, and adopt new coping skills prior to discharge  Outcome: Progressing  Goal: Verbalize thoughts and feelings  Description: Interventions:  - Assess and re-assess patient's level of risk   - Engage patient in 1:1 interactions, daily, for a minimum of 15 minutes   - Encourage patient to express feelings, fears, frustrations, hopes   Outcome: Progressing  Goal: Refrain from isolation  Description: Interventions:  - Develop a trusting relationship   - Encourage socialization   Outcome: Progressing  Goal: Refrain from self-neglect  Outcome: Progressing  Goal: Complete daily ADLs, including personal hygiene independently, as able  Description: Interventions:  - Observe, teach, and assist patient with ADLS  -  Monitor and promote a balance of rest/activity, with adequate nutrition and elimination   Outcome: Progressing     Problem: Anxiety  Goal: Anxiety is at manageable level  Description: Interventions:  - Assess and monitor patient's anxiety level  - Monitor for signs and symptoms (heart palpitations, chest pain, shortness of breath, headaches, nausea, feeling jumpy, restlessness, irritable, apprehensive)  - Collaborate with interdisciplinary team and initiate plan and interventions as ordered    - Goodman patient to unit/surroundings  - Explain treatment plan  - Encourage participation in care  - Encourage verbalization of concerns/fears  - Identify coping mechanisms  - Assist in developing anxiety-reducing skills  - Administer/offer alternative therapies  - Limit or eliminate stimulants  Outcome: Progressing     Problem: Ineffective Coping  Goal: Participates in unit activities  Description: Interventions:  - Provide therapeutic environment   - Provide required programming   - Redirect inappropriate behaviors   Outcome: Progressing     Problem: PAIN - ADULT  Goal: Verbalizes/displays adequate comfort level or baseline comfort level  Description: Interventions:  - Encourage patient to monitor pain and request assistance  - Assess pain using appropriate pain scale  - Administer analgesics based on type and severity of pain and evaluate response  - Implement non-pharmacological measures as appropriate and evaluate response  - Consider cultural and social influences on pain and pain management  - Notify physician/advanced practitioner if interventions unsuccessful or patient reports new pain  Outcome: Progressing     Problem: DISCHARGE PLANNING  Goal: Discharge to home or other facility with appropriate resources  Description: INTERVENTIONS:  - Identify barriers to discharge w/patient and caregiver  - Arrange for needed discharge resources and transportation as appropriate  - Identify discharge learning needs (meds, wound care, etc )  - Arrange for interpretive services to assist at discharge as needed  - Refer to Case Management Department for coordinating discharge planning if the patient needs post-hospital services based on physician/advanced practitioner order or complex needs related to functional status, cognitive ability, or social support system  Outcome: Progressing

## 2022-12-11 NOTE — NURSING NOTE
Pt up ad nelsy & gait is steady  Pt c/o depression 8/10 & anxiety 8/10  Pt denies any hallucinations, suicidal or homicidal ideations  Q 7 min checks maintained to monitor pt's behavior & safety  Pt is pleasant & socializes with other patients  Pt is cooperative & compliant with medications

## 2022-12-11 NOTE — PROGRESS NOTES
Progress Note - Behavioral Health   Cherri Perez III 45 y o  male MRN: [id number]  Unit/Bed#: [facility] 209-02 Encounter: [id number]  This note was not shared with the patient due to reasonable likelihood of causing patient harm     Assessment/Plan   Principal Problem:    MDD (major depressive disorder), recurrent episode, severe ([facility] [address] )  Active Problems:    HLA B27 positive    Anxiety    Hyperlipidemia    Hypertension    Gastroesophageal reflux disease    History of colon polyps    Centrilobular emphysema (HCC)    Ankylosing spondylitis of cervical region [facility])    Primary adenocarcinoma of upper lobe of right lung (HCC)    Moderate alcohol use disorder (HCC)      Behavior over the last 24 hours:  unchanged  Sleep: improving  Appetite: normal  Medication side effects: No  ROS: no complaints and all other systems are negative    Mental Status Evaluation:  Appearance:  age appropriate   Behavior:  restless and fidgety   Speech:  normal volume   Mood:  anxious and depressed   Affect:  mood-congruent   Thought Process:  goal directed   Associations: intact associations   Thought Content:  no overt delusions   Perceptual Disturbances: None   Risk Potential: Suicidal Ideations none  Homicidal Ideations none  Potential for Aggression No   Sensorium:  person, place and time/date   Memory:  recent and remote memory grossly intact   Consciousness:  alert and awake    Attention: attention span and concentration were age appropriate   Insight:  limited   Judgment: limited   Gait/Station: normal gait/station   Motor Activity: no abnormal movements     Progress Toward Goals: Pt continues depressed, anxious and requesting Ativan as needed regularly  Remains frustrated due to marital issues and possible separation  Calorie intake and meds adherence have improved  Recommended Treatment: Continue with group therapy, milieu therapy and occupational therapy        Risks, benefits and possible side effects of Medications:   Risks, benefits, and possible side effects of medications explained to patient and patient verbalizes understanding  Medications: all current active meds have been reviewed  Increase Naltrexone to 50 mg po daily    Labs: I have personally reviewed all pertinent laboratory/tests results  Most Recent Labs:   Lab Results   Component Value Date    WBC 5 06 12/09/2022    RBC 4 64 12/09/2022    HGB 15 0 12/09/2022    HCT 44 8 12/09/2022     12/09/2022    RDW 13 3 12/09/2022    NEUTROABS 3 09 12/09/2022    SODIUM 140 12/09/2022    K 3 5 12/09/2022     12/09/2022    CO2 27 12/09/2022    BUN 15 12/09/2022    CREATININE 0 84 12/09/2022    GLUC 87 12/09/2022    GLUF 87 12/09/2022    CALCIUM 9 3 12/09/2022    AST 25 06/19/2022    ALT 36 06/19/2022    ALKPHOS 99 06/19/2022    TP 7 9 06/19/2022    ALB 4 4 06/19/2022    TBILI 0 84 06/19/2022    CHOLESTEROL 180 12/09/2022    HDL 65 12/09/2022    TRIG 122 12/09/2022    LDLCALC 91 12/09/2022    NONHDLC 115 12/09/2022    [id number] 2 818 08/10/2021    RPR Non-Reactive 12/09/2022    HGBA1C 6 5 10/27/2022     02/09/2016       Counseling / Coordination of Care  Total floor / unit time spent today 20 minutes  Greater than 50% of total time was spent with the patient and / or family counseling and / or coordination of care

## 2022-12-11 NOTE — NURSING NOTE
Visible on unit throughout the evening  Spends time playing cards with peers  Appears depressed  Verbalizes moderate anxiety related to wife filing for divorce and his house being sold  He reports PRN medications given throughout the day have been "somewhat" effective for relief of anxiety  Denies SI/HI and AH/VH  Denies pain  Compliant with medications  Maintained on Q 7 minute checks

## 2022-12-12 RX ORDER — QUETIAPINE FUMARATE 50 MG/1
50 TABLET, FILM COATED ORAL DAILY
Status: DISCONTINUED | OUTPATIENT
Start: 2022-12-13 | End: 2022-12-16 | Stop reason: HOSPADM

## 2022-12-12 RX ADMIN — TOPIRAMATE 100 MG: 100 TABLET, FILM COATED ORAL at 16:48

## 2022-12-12 RX ADMIN — THIAMINE HCL TAB 100 MG 100 MG: 100 TAB at 08:18

## 2022-12-12 RX ADMIN — RISPERIDONE 1 MG: 1 TABLET ORAL at 17:16

## 2022-12-12 RX ADMIN — Medication 3 MG: at 20:56

## 2022-12-12 RX ADMIN — TOPIRAMATE 100 MG: 100 TABLET, FILM COATED ORAL at 08:18

## 2022-12-12 RX ADMIN — HYDROXYZINE HYDROCHLORIDE 25 MG: 25 TABLET, FILM COATED ORAL at 12:01

## 2022-12-12 RX ADMIN — FAMOTIDINE 20 MG: 20 TABLET, FILM COATED ORAL at 16:47

## 2022-12-12 RX ADMIN — Medication 1 TABLET: at 08:18

## 2022-12-12 RX ADMIN — FAMOTIDINE 20 MG: 20 TABLET, FILM COATED ORAL at 08:18

## 2022-12-12 RX ADMIN — PRAVASTATIN SODIUM 40 MG: 40 TABLET ORAL at 16:48

## 2022-12-12 RX ADMIN — QUETIAPINE FUMARATE 100 MG: 100 TABLET ORAL at 20:56

## 2022-12-12 RX ADMIN — FOLIC ACID 1 MG: 1 TABLET ORAL at 08:18

## 2022-12-12 RX ADMIN — AMLODIPINE BESYLATE 5 MG: 5 TABLET ORAL at 08:18

## 2022-12-12 RX ADMIN — NALTREXONE HYDROCHLORIDE 50 MG: 50 TABLET, FILM COATED ORAL at 08:18

## 2022-12-12 RX ADMIN — TRAZODONE HYDROCHLORIDE 100 MG: 100 TABLET ORAL at 20:57

## 2022-12-12 NOTE — NURSING NOTE
No complaints of anxiety or signs of withdrawal overnight  No noted suicidal ideations or homicidal behaviors  Fluids at bedside to promote hydration  No aspiration risks noted  Patient observed sleeping during most q 7 minute safety checks  No observable distress or changes in medical condition  Will continue to monitor

## 2022-12-12 NOTE — NURSING NOTE
Patient has been visible in community throughout shift  Remains moderately anxious/depressed, though believes he has begun to "accept" his wife's decision re: to  an impending divorce  Voiced no additional adverse psychiatric sx  Social and interactive with staff/peers  Cooperative with medication administration  Attended AM group  Participates readily in non-structured/group activities as card-playing  Did report increased anxiety late am, requesting medication for sx relief  Atarax 25 mg given po at 12 Noon  Will continue to monitor/assess and reinforce positive gains

## 2022-12-12 NOTE — PROGRESS NOTES
12/12/22   Team Meeting   Meeting Type Daily Rounds   Team Members Present   Team Members Present Physician;Nurse;Occupational Therapist;   Physician Team Member Dr Enzo Holbrook MD; [address]   Nursing Team Member [facility] RN; [facility]   Social Work Team Member [id number]   OT Team Member Ciarra Thomas [facility]S   Patient/Family Present   Patient Present No   Patient's Family Present No     Pt rep dep and anx high  Prn's given regularly  Relationship stresors  Pt rep 6/10 anx and dep   Pt slept last pm  Fairly social

## 2022-12-12 NOTE — PROGRESS NOTES
12/12/22 0612   Activity/Group Checklist   Group Community meeting   Attendance Attended   Attendance Duration (min) 46-60   Interactions Interacted appropriately   Affect/Mood Appropriate   Goals Achieved Identified feelings; Able to listen to others; Able to engage in interactions; Able to recieve feedback; Able to self-disclose

## 2022-12-12 NOTE — PROGRESS NOTES
Progress Note - Doris Joyner III 45 y o  male MRN: [id number]    Unit/Bed#: [facility] 209-02 Encounter: [id number]        Subjective:   Patient seen and examined at bedside after reviewing the chart and discussing the case with the caring staff  Patient examined at bedside  Patient has no acute issues  Physical Exam   Vitals: Blood pressure 120/78, pulse 72, temperature 98 °F (36 7 °C), temperature source Temporal, resp  rate 16, height 6' (1 829 m), weight 81 6 kg (180 lb), SpO2 99 %  ,Body mass index is 24 41 kg/m²  Constitutional: He appears well-developed  HEENT: PERR, EOMI, MMM  Cardiovascular: Normal rate and regular rhythm  Pulmonary/Chest: Effort normal and breath sounds normal    Abdomen: Soft, + BS, NT    Assessment/Plan:  Doris Joyner III is a(n) 46 y o  male with MDD      1  Cardiac with history of HTN, HLD  Patient is on amlodipine 5 mg daily  Crestor not available in hospital   Pravastatin 40 mg daily  2  GERD  Continue famotidine 20 mg twice daily, omeprazole 40 mg daily  3  Alcohol abuse  Patient started on multivitamin, thiamine, folic acid  And is also on ReVia  4  Ankylosing spondylitis  Stable  Patient reports following with outpatient rheumatology  5  DJD/OA  Patient may take Tylenol as needed  6  Primary adenocarcinoma of RUL s/p lobectomy 6/7/2021  Patient is being followed by cardiothoracic surgery  CT chest 10/3/2022 without evidence of recurrent tumor or thoracic metastatic disease  7   New vitamin D deficiency  I will put the patient on vitamin D bolus doses for 8 weeks followed by vitamin D3 1000 units daily

## 2022-12-12 NOTE — NURSING NOTE
Patient approached the nurses' station requesting anti-anxiety medication  Simon sore 17 upon eval  Atarax 25 mg given po at 1201 with mild sx relief
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
## 2022-12-12 NOTE — NURSING NOTE
Anxiety controlled this evening  No signs or symptoms of withdrawal noted  Rated anxiety and depression 6/10  Denies any suicidal thoughts  Talked at length about issues with his wife  Stated he needs to go home due to these fact  Out in community and social   Maintained on q 7 minute safety checks

## 2022-12-12 NOTE — PLAN OF CARE
Problem: Ineffective Coping  Goal: Participates in unit activities  Description: Interventions:  - Provide therapeutic environment   - Provide required programming   - Redirect inappropriate behaviors   Outcome: Progressing   Patient out for groups, interactive with staff and peers is able to remain engaged

## 2022-12-12 NOTE — PLAN OF CARE
Problem: Ineffective Coping  Goal: Identifies ineffective coping skills  Outcome: Progressing  Goal: Identifies healthy coping skills  Outcome: Progressing  Goal: Patient/Family verbalizes awareness of resources  Outcome: Progressing     Problem: Depression  Goal: Treatment Goal: Demonstrate behavioral control of depressive symptoms, verbalize feelings of improved mood/affect, and adopt new coping skills prior to discharge  Outcome: Progressing  Goal: Verbalize thoughts and feelings  Description: Interventions:  - Assess and re-assess patient's level of risk   - Engage patient in 1:1 interactions, daily, for a minimum of 15 minutes   - Encourage patient to express feelings, fears, frustrations, hopes   Outcome: Progressing  Goal: Refrain from isolation  Description: Interventions:  - Develop a trusting relationship   - Encourage socialization   Outcome: Progressing  Goal: Refrain from self-neglect  Outcome: Progressing  Goal: Complete daily ADLs, including personal hygiene independently, as able  Description: Interventions:  - Observe, teach, and assist patient with ADLS  -  Monitor and promote a balance of rest/activity, with adequate nutrition and elimination   Outcome: Progressing     Problem: Anxiety  Goal: Anxiety is at manageable level  Description: Interventions:  - Assess and monitor patient's anxiety level  - Monitor for signs and symptoms (heart palpitations, chest pain, shortness of breath, headaches, nausea, feeling jumpy, restlessness, irritable, apprehensive)  - Collaborate with interdisciplinary team and initiate plan and interventions as ordered    - [place] patient to unit/surroundings  - Explain treatment plan  - Encourage participation in care  - Encourage verbalization of concerns/fears  - Identify coping mechanisms  - Assist in developing anxiety-reducing skills  - Administer/offer alternative therapies  - Limit or eliminate stimulants  Outcome: Progressing     Problem: PAIN - ADULT  Goal: Verbalizes/displays adequate comfort level or baseline comfort level  Description: Interventions:  - Encourage patient to monitor pain and request assistance  - Assess pain using appropriate pain scale  - Administer analgesics based on type and severity of pain and evaluate response  - Implement non-pharmacological measures as appropriate and evaluate response  - Consider cultural and social influences on pain and pain management  - Notify physician/advanced practitioner if interventions unsuccessful or patient reports new pain  Outcome: Progressing

## 2022-12-12 NOTE — NURSING NOTE
Patient came to this nurse asking for something for agitation  Patient states that Risperdal helps the most  Patient given 1mg per PRN order

## 2022-12-13 RX ADMIN — Medication 1 TABLET: at 08:21

## 2022-12-13 RX ADMIN — Medication 3 MG: at 21:36

## 2022-12-13 RX ADMIN — NALTREXONE HYDROCHLORIDE 50 MG: 50 TABLET, FILM COATED ORAL at 08:21

## 2022-12-13 RX ADMIN — FAMOTIDINE 20 MG: 20 TABLET, FILM COATED ORAL at 08:21

## 2022-12-13 RX ADMIN — TRAZODONE HYDROCHLORIDE 100 MG: 100 TABLET ORAL at 21:36

## 2022-12-13 RX ADMIN — FAMOTIDINE 20 MG: 20 TABLET, FILM COATED ORAL at 16:45

## 2022-12-13 RX ADMIN — FOLIC ACID 1 MG: 1 TABLET ORAL at 08:21

## 2022-12-13 RX ADMIN — QUETIAPINE FUMARATE 50 MG: 50 TABLET ORAL at 08:21

## 2022-12-13 RX ADMIN — TOPIRAMATE 100 MG: 100 TABLET, FILM COATED ORAL at 16:45

## 2022-12-13 RX ADMIN — PRAVASTATIN SODIUM 40 MG: 40 TABLET ORAL at 16:45

## 2022-12-13 RX ADMIN — TOPIRAMATE 100 MG: 100 TABLET, FILM COATED ORAL at 08:21

## 2022-12-13 RX ADMIN — QUETIAPINE FUMARATE 100 MG: 100 TABLET ORAL at 21:36

## 2022-12-13 RX ADMIN — THIAMINE HCL TAB 100 MG 100 MG: 100 TAB at 08:21

## 2022-12-13 RX ADMIN — AMLODIPINE BESYLATE 5 MG: 5 TABLET ORAL at 08:21

## 2022-12-13 NOTE — NURSING NOTE
Patient visible in dinning room most of the shift and for all meals  Patient soical with peers, calm, and cooperative  Med compliant  Denies SI/HI, AH/VH, anxiety, depression, and pain  Patient states that he has no complaints, concerns, or questions at this time  Currently patient is in dining room talking with peers  Will continue to monitor  Q7min checks are in progress

## 2022-12-13 NOTE — PROGRESS NOTES
Progress Note - Aamir Chao III 45 y o  male MRN: [id number]    Unit/Bed#: Yokasta Perkins 209-02 Encounter: [id number]        Subjective:   Patient seen and examined at bedside after reviewing the chart and discussing the case with the caring staff  Patient examined at bedside  Patient has no acute issues  Physical Exam   Vitals: Blood pressure 119/67, pulse 76, temperature 98 °F (36 7 °C), temperature source Temporal, resp  rate 18, height 6' (1 829 m), weight 81 6 kg (180 lb), SpO2 97 %  ,Body mass index is 24 41 kg/m²  Constitutional: He appears well-developed  HEENT: PERR, EOMI, MMM  Cardiovascular: Normal rate and regular rhythm  Pulmonary/Chest: Effort normal and breath sounds normal    Abdomen: Soft, + BS, NT    Assessment/Plan:  Aamir Chao III is a(n) 46 y o  male with MDD      1  Cardiac with history of HTN, HLD  Patient is on amlodipine 5 mg daily  Crestor not available in hospital   Pravastatin 40 mg daily  2  GERD  Continue famotidine 20 mg twice daily, omeprazole 40 mg daily  3  Alcohol abuse  Patient started on multivitamin, thiamine, folic acid  And is also on ReVia  4  Ankylosing spondylitis  Stable  Patient reports following with outpatient rheumatology  5  DJD/OA  Patient may take Tylenol as needed  6  Primary adenocarcinoma of RUL s/p lobectomy 6/7/2021  Patient is being followed by cardiothoracic surgery  CT chest 10/3/2022 without evidence of recurrent tumor or thoracic metastatic disease  7   New vitamin D deficiency  I will put the patient on vitamin D bolus doses for 8 weeks followed by vitamin D3 1000 units daily

## 2022-12-13 NOTE — PROGRESS NOTES
Progress Note - Behavioral Health   Halle Guerra III 45 y o  male MRN: [id number]  Unit/Bed#: Lisa Gordillo 209-02 Encounter: [id number]    Patient was seen today for continuation of care, records reviewed and patient was discussed with the morning case review team   Per staff, patient requested PRN yesterday afternoon at [phone] for increasing agitation, he received 1mg PO Risperdal which was effective  He has been visible and cooperative on the unit  Vearl Sessions was seen today for psychiatric follow-up  On assessment today, Vearl Sessions was seen in the group room  He has been social on the unit with peers  Vearl Sessions states he is feeling "better"  He feels the added Seroquel dose this morning was helpful for anxiety  He remains fixated on discharge  He reports reduced anxiety and depression symptoms  He denies any alcohol withdrawal symptoms  Vearl Sessions was guarded today and affect is constricted, his answers were brief during the interview today  He appears disinterested in talking, he reports that he "is done with his wife" and feels ready to "move on with his life"  He reports that his appetite is "okay", per chart review he is completing  percent of his meals  He reports sleeping well overnight  Seroquel dose was added this morning 50mg PO, if patient continues to tolerate, will consider increase to 100mg in am for mood stabilization  Vearl Sessions denies acute suicidal/self-harm ideation/intent/plan  Vearl Sessions remains behaviorally appropriate, no agitation or aggression noted on exam or in report  Vearl Sessions denies HI/AH/VH  Vearl Sessions appears to be depressed and constricted today but reports symptoms are improving    Vearl Sessions remains adherent to his current psychotropic medication regimen and denies any side effects from medications, as well as none noted on exam     Vitals:  Vitals:    12/13/22 0808   BP: 119/67   Pulse: 76   Resp: 18   Temp: 98 °F (36 7 °C)   SpO2: 97%       Laboratory Results:    I have personally reviewed all pertinent laboratory/tests results  Most Recent Labs:   Lab Results   Component Value Date    WBC 5 06 12/09/2022    RBC 4 64 12/09/2022    HGB 15 0 12/09/2022    HCT 44 8 12/09/2022     12/09/2022    RDW 13 3 12/09/2022    NEUTROABS 3 09 12/09/2022    SODIUM 140 12/09/2022    K 3 5 12/09/2022     12/09/2022    CO2 27 12/09/2022    BUN 15 12/09/2022    CREATININE 0 84 12/09/2022    GLUC 87 12/09/2022    GLUF 87 12/09/2022    CALCIUM 9 3 12/09/2022    AST 25 06/19/2022    ALT 36 06/19/2022    ALKPHOS 99 06/19/2022    TP 7 9 06/19/2022    ALB 4 4 06/19/2022    TBILI 0 84 06/19/2022    CHOLESTEROL 180 12/09/2022    HDL 65 12/09/2022    TRIG 122 12/09/2022    LDLCALC 91 12/09/2022    NONHDLC 115 12/09/2022    [id number] 2 818 08/10/2021    RPR Non-Reactive 12/09/2022    HGBA1C 6 5 10/27/2022     02/09/2016       Psychiatric Review of Systems:  Behavior over the last 24 hours:  Slowly improving     Sleep: improving  Appetite: improving  Medication side effects: denies and none noted on exam   ROS: no complaints, denies shortness of breath or chest pain and all other systems are negative for acute changes    Mental Status Evaluation:  Appearance:  casually dressed, adequate grooming   Behavior:  cooperative, gaurded   Speech:  normal rate and volume   Mood:  depressed   Affect:  constricted   Thought Process:  linear   Thought Content:  no overt delusions, no overt paranoia noted on exam   Perceptual Disturbances: none   Risk Potential: Suicidal ideation - None  Homicidal ideation - None  Potential for aggression - No   Memory:  recent and remote memory grossly intact   Sensorium  person, place and situation      Consciousness:  alert and awake   Attention: attention span and concentration are age appropriate   Insight:  limited   Judgment: limited   Gait/Station: normal gait/station   Motor Activity: no abnormal movements   Progress Toward Goals:   Alex Stevens is progressing towards goals of inpatient psychiatric treatment by continued medication compliance and is attending therapeutic modalities on the milieu  However, the patient continues to require inpatient psychiatric hospitalization for continued medication management and titration to optimize symptom reduction, improve sleep hygiene, and demonstrate adequate self-care  Assessment/Plan   Principal Problem:    MDD (major depressive disorder), recurrent episode, severe (HCC)  Active Problems:    HLA B27 positive    Anxiety    Hyperlipidemia    Hypertension    Gastroesophageal reflux disease    History of colon polyps    Centrilobular emphysema (HCC)    Ankylosing spondylitis of cervical region [facility])    Primary adenocarcinoma of upper lobe of right lung (HCC)    Moderate alcohol use disorder ([facility] [address] )      Recommended Treatment: Treatment plan and medication changes discussed and per the attending physician the plan is: 1  Continue with group therapy, milieu therapy and occupational therapy  2  Behavioral Health checks every 7 minutes  3  Continue frequent safety checks and vitals per unit protocol  4  Continue with SLIM medical management as indicated  5  Continue with current medication regimen  6  Will review labs in the a m  7 Disposition Planning: Discharge planning and efforts remain ongoing    Behavioral Health Medications: all current active meds have been reviewed and continue current psychiatric medications    Current Facility-Administered Medications   Medication Dose Route Frequency Provider Last Rate   • aluminum-magnesium hydroxide-simethicone  30 mL Oral Q4H PRN Reddy Herring MD     • amLODIPine  5 mg Oral Daily Reddy Herring MD     • [START ON 1/28/2023] cholecalciferol  1,000 Units Oral Daily Antonio Roy MD     • ergocalciferol  50,000 Units Oral Weekly Antonio Roy MD     • famotidine  20 mg Oral BID Reddy Herring MD     • folic acid  1 mg Oral Daily Giselle De La Cruz PA-C     • haloperidol lactate  5 mg Intramuscular Q4H PRN Max 4/day Desirae Mendoza MD     • hydrOXYzine HCL  25 mg Oral Q6H PRN Max 4/day Desirae Mendoza MD     • LORazepam  1 mg Intramuscular Q6H PRN Max 3/day Desirae Mendoza MD     • LORazepam  1 mg Oral Q8H PRN Chasity Mancini MD     • melatonin  3 mg Oral HS Desirae Mendoza MD     • multivitamin-minerals  1 tablet Oral Daily Giselle De La Cruz PA-C     • naltrexone  50 mg Oral Daily Chasity Mancini MD     • nicotine polacrilex  4 mg Oral Q2H PRN Desirae Mendoza MD     • pravastatin  40 mg Oral Daily With Azar De La Cruz PA-C     • QUEtiapine  100 mg Oral HS Chasity Mancini MD     • QUEtiapine  50 mg Oral Daily CHOLO Lagunas     • risperiDONE  0 25 mg Oral Q4H PRN Max 6/day Desirae Mendoza MD     • risperiDONE  0 5 mg Oral Q4H PRN Max 3/day Desirae Mendoaz MD     • risperiDONE  1 mg Oral Q2H PRN Max 3/day Desirae Mendoza MD     • thiamine  100 mg Oral Daily Giselle De La Cruz PA-C     • topiramate  100 mg Oral BID Chasity aMncini MD     • traZODone  100 mg Oral HS Desirae Mendoza MD         Risks / Benefits of Treatment:  Risks, benefits, and possible side effects of medications explained to patient and patient verbalizes understanding and agreement for treatment  Counseling / Coordination of Care:  Patient's progress reviewed with nursing staff  Medications, treatment progress and treatment plan reviewed with patient  Supportive counseling provided to the patient  Total floor/unit time spent today 25 minutes  Greater than 50% of total time was spent with the patient and / or family counseling and / or coordination of care  A description of the counseling / coordination of care: medication education, treatment plan, supportive therapy

## 2022-12-13 NOTE — NURSING NOTE
Patient was visible on the unit this evening  Patient was med compliant, cooperative, and pleasant  Patient denies all psych symptoms  No acute behaviors observed  Will CTM  Continuous patient safety checks ongoing

## 2022-12-13 NOTE — PROGRESS NOTES
12/13/22   Team Meeting   Meeting Type Daily Rounds   Team Members Present   Team Members Present Physician;Nurse;Occupational Therapist;   Physician Team Member Dr Wen Clement; [address]   Nursing Team Member Jacquie White RN   Social Work Team Member [id number]   OT Team Member Denisa Jennings CTRS   Patient/Family Present   Patient Present No   Patient's Family Present No     Pt reported anx and dep 5/10  Pt reported wanting to focus on caring for himself currently  Pt is med compliant

## 2022-12-13 NOTE — PROGRESS NOTES
12/13/22 0220   Activity/Group Checklist   Group   ([facility])   Attendance Attended   Attendance Duration (min) Greater than 60   Interactions Interacted appropriately   Affect/Mood Appropriate;Calm   Goals Achieved Identified feelings; Discussed coping strategies; Increased hopefulness; Identified resources and support systems; Able to engage in interactions; Able to listen to others; Able to reflect/comment on own behavior;Able to manage/cope with feelings

## 2022-12-13 NOTE — PROGRESS NOTES
12/13/22 7075   Activity/Group Checklist   Group Community meeting   Attendance Attended   Attendance Duration (min) 46-60   Interactions Interacted appropriately   Affect/Mood Appropriate   Goals Achieved Identified feelings; Able to listen to others; Able to engage in interactions; Able to self-disclose; Able to recieve feedback

## 2022-12-14 RX ADMIN — AMLODIPINE BESYLATE 5 MG: 5 TABLET ORAL at 08:46

## 2022-12-14 RX ADMIN — QUETIAPINE FUMARATE 50 MG: 50 TABLET ORAL at 08:46

## 2022-12-14 RX ADMIN — PRAVASTATIN SODIUM 40 MG: 40 TABLET ORAL at 17:09

## 2022-12-14 RX ADMIN — FAMOTIDINE 20 MG: 20 TABLET, FILM COATED ORAL at 17:09

## 2022-12-14 RX ADMIN — RISPERIDONE 1 MG: 1 TABLET ORAL at 17:49

## 2022-12-14 RX ADMIN — NALTREXONE HYDROCHLORIDE 50 MG: 50 TABLET, FILM COATED ORAL at 08:46

## 2022-12-14 RX ADMIN — Medication 1 TABLET: at 08:46

## 2022-12-14 RX ADMIN — Medication 3 MG: at 21:43

## 2022-12-14 RX ADMIN — TOPIRAMATE 100 MG: 100 TABLET, FILM COATED ORAL at 17:09

## 2022-12-14 RX ADMIN — TOPIRAMATE 100 MG: 100 TABLET, FILM COATED ORAL at 08:46

## 2022-12-14 RX ADMIN — FAMOTIDINE 20 MG: 20 TABLET, FILM COATED ORAL at 08:46

## 2022-12-14 RX ADMIN — THIAMINE HCL TAB 100 MG 100 MG: 100 TAB at 08:46

## 2022-12-14 RX ADMIN — FOLIC ACID 1 MG: 1 TABLET ORAL at 08:46

## 2022-12-14 RX ADMIN — TRAZODONE HYDROCHLORIDE 100 MG: 100 TABLET ORAL at 21:43

## 2022-12-14 RX ADMIN — QUETIAPINE FUMARATE 100 MG: 100 TABLET ORAL at 21:43

## 2022-12-14 NOTE — PROGRESS NOTES
12/14/22 0738   Activity/Group Checklist   Group Community meeting   Attendance Attended   Attendance Duration (min) 46-60   Interactions Interacted appropriately   Affect/Mood Appropriate   Goals Achieved Identified feelings; Able to listen to others; Able to engage in interactions; Able to self-disclose; Able to recieve feedback

## 2022-12-14 NOTE — PROGRESS NOTES
12/14/22   Team Meeting   Meeting Type Daily Rounds   Team Members Present   Team Members Present Physician;Nurse;; Occupational Therapist   Physician Team Member Dr Akua Montero MD; [address]   Nursing Team Member Winston Rebolledo RN; Martine Olguin RN, [facility]   Social Work Team Member [id number]   OT Team Member Re LUZ   Patient/Family Present   Patient Present No   Patient's Family Present No     Pt social and pleasant  Slept last night  Pt anxious to leave hosp  [facility] d/c date Friday

## 2022-12-14 NOTE — PROGRESS NOTES
Progress Note - Behavioral Health   Charline Figueroat III 45 y o  male MRN: [id number]  Unit/Bed#: Eulogio Edwards 209-02 Encounter: [id number]    Patient was seen today for continuation of care, records reviewed and patient was discussed with the morning case review team   Per staff, patient is visible in the milieu and is attending groups  He is compliant with medications, no acute events over the past 24 hours  Brandon Delarosa was seen today for psychiatric follow-up  On assessment today, Brandon Delarosa was seen resting in his room  Brandon Delarosa appears overall euthymic today, affect is slightly constricted  He was pleasant and cooperative with the interview  He states that the Seroquel dose in the morning has helped his anxiety  He has not required PRN medication for anxiety yesterday or today  He feels the current dose is effective  He has been playing cards and interacting appropriately on the unit  He reports that his depression is improving  Brandon Delarosa denies acute suicidal/self-harm ideation/intent/plan  Brandon Delarosa remains pleasant, cooperative and social, no agitation or aggression noted on exam or in report  Brandon Delarosa denies HI/AH/VH, and does not appear overtly manic  No overt delusions or paranoia are verbalized  Brandon Delarosa remains adherent to his current psychotropic medication regimen and denies any side effects from medications, as well as none noted on exam     Vitals:  Vitals:    12/14/22 0749   BP: 139/76   Pulse: 63   Resp: 16   Temp: 99 °F (37 2 °C)   SpO2: 100%       Laboratory Results:    I have personally reviewed all pertinent laboratory/tests results    Most Recent Labs:   Lab Results   Component Value Date    WBC 5 06 12/09/2022    RBC 4 64 12/09/2022    HGB 15 0 12/09/2022    HCT 44 8 12/09/2022     12/09/2022    RDW 13 3 12/09/2022    NEUTROABS 3 09 12/09/2022    SODIUM 140 12/09/2022    K 3 5 12/09/2022     12/09/2022    CO2 27 12/09/2022    BUN 15 12/09/2022    CREATININE 0 84 12/09/2022    GLUC 87 12/09/2022    GLUF 87 12/09/2022    CALCIUM 9 3 12/09/2022    AST 25 06/19/2022    ALT 36 06/19/2022    ALKPHOS 99 06/19/2022    TP 7 9 06/19/2022    ALB 4 4 06/19/2022    TBILI 0 84 06/19/2022    CHOLESTEROL 180 12/09/2022    HDL 65 12/09/2022    TRIG 122 12/09/2022    LDLCALC 91 12/09/2022    NONHDLC 115 12/09/2022    [id number] 2 818 08/10/2021    RPR Non-Reactive 12/09/2022    HGBA1C 6 5 10/27/2022     02/09/2016       Psychiatric Review of Systems:  Behavior over the last 24 hours:  improved  Sleep: improving  Appetite: improving  Medication side effects: denies and none noted on exam   ROS: no complaints, denies shortness of breath or chest pain and all other systems are negative for acute changes    Mental Status Evaluation:  Appearance:  casually dressed, adequate grooming   Behavior:  pleasant, cooperative, calm   Speech:  normal rate and volume   Mood:  euthymic   Affect:  constricted   Thought Process:  goal directed, linear   Thought Content:  no overt delusions, no overt paranoia noted on exam   Perceptual Disturbances: none   Risk Potential: Suicidal ideation - None  Homicidal ideation - None  Potential for aggression - No   Memory:  recent and remote memory grossly intact   Sensorium  person, place and situation      Consciousness:  alert and awake   Attention: attention span and concentration are age appropriate   Insight:  improving   Judgment: improving   Gait/Station: normal gait/station   Motor Activity: no abnormal movements   Progress Toward Goals:   Romana Gondola is progressing towards goals of inpatient psychiatric treatment by continued medication compliance and is attending therapeutic modalities on the milieu  However, the patient continues to require inpatient psychiatric hospitalization for continued medication management and titration to optimize symptom reduction, improve sleep hygiene, and demonstrate adequate self-care      Assessment/Plan   Principal Problem:    MDD (major depressive disorder), recurrent episode, severe ([address] )  Active Problems:    HLA B27 positive    Anxiety    Hyperlipidemia    Hypertension    Gastroesophageal reflux disease    History of colon polyps    Centrilobular emphysema (HCC)    Ankylosing spondylitis of cervical region [facility])    Primary adenocarcinoma of upper lobe of right lung (HCC)    Moderate [facility] use disorder ([facility] )      Recommended Treatment: Treatment plan and medication changes discussed and per the attending physician the plan is: 1  Continue with group therapy, milieu therapy and occupational therapy  2  Behavioral Health checks every 7 minutes  3  Continue frequent safety checks and vitals per unit protocol  4  Continue with SLIM medical management as indicated  5  Continue with current medication regimen  6  Will review labs in the a m  7 Disposition Planning: Discharge planning and efforts remain ongoing    Behavioral Health Medications: all current active meds have been reviewed and continue current psychiatric medications    Current Facility-Administered Medications   Medication Dose Route Frequency Provider Last Rate   • aluminum-magnesium hydroxide-simethicone  30 mL Oral Q4H PRN Bassam Maria MD     • amLODIPine  5 mg Oral Daily Bassam Maria MD     • [START ON 1/28/2023] cholecalciferol  1,000 Units Oral Daily Flower Sam MD     • ergocalciferol  50,000 Units Oral Weekly Flower Sam MD     • famotidine  20 mg Oral BID Bassam Maria MD     • folic acid  1 mg Oral Daily Giselle De La Cruz PA-C     • haloperidol lactate  5 mg Intramuscular Q4H PRN Max 4/day Bassam Maria MD     • hydrOXYzine HCL  25 mg Oral Q6H PRN Max 4/day Bassam Maria MD     • LORazepam  1 mg Intramuscular Q6H PRN Max 3/day Bassam Maria MD     • LORazepam  1 mg Oral Q8H PRN Bobby Araiza MD     • melatonin  3 mg Oral HS Bassam Maria MD     • multivitamin-minerals  1 tablet Oral Daily Giselle De La Cruz PA-C     • naltrexone  50 mg Oral Daily Samia Maddox MD     • nicotine polacrilex  4 mg Oral Q2H PRN Lamin Crocker MD     • pravastatin  40 mg Oral Daily With Azar De La Cruz PA-C     • QUEtiapine  100 mg Oral HS Samia Maddox MD     • QUEtiapine  50 mg Oral Daily CHOLO Aguirre     • risperiDONE  0 25 mg Oral Q4H PRN Max 6/day Lamin Crocker MD     • risperiDONE  0 5 mg Oral Q4H PRN Max 3/day Lamin Crocker MD     • risperiDONE  1 mg Oral Q2H PRN Max 3/day Lamin Crocker MD     • thiamine  100 mg Oral Daily Giselle De La Cruz PA-C     • topiramate  100 mg Oral BID Samia Maddox MD     • traZODone  100 mg Oral HS Lamin Crocker MD         Risks / Benefits of Treatment:  Risks, benefits, and possible side effects of medications explained to patient and patient verbalizes understanding and agreement for treatment  Counseling / Coordination of Care:  Patient's progress reviewed with nursing staff  Medications, treatment progress and treatment plan reviewed with patient  Supportive counseling provided to the patient  Total floor/unit time spent today 25 minutes  Greater than 50% of total time was spent with the patient and / or family counseling and / or coordination of care  A description of the counseling / coordination of care: medication education, treatment plan, supportive therapy

## 2022-12-14 NOTE — NURSING NOTE
Pt medicated for c/o increased anxiety & restlessness @ 1747 with Risperidone 1 mg po as requested by pt   Simon - 16 & Agitation - 20 @ this time  Pt c/o that anxiety is due to impending discharge on 12/16/22

## 2022-12-14 NOTE — NURSING NOTE
Pt up ad nelsy & gait is steady  Pt denies any hallucinations, suicidaL or homicidal ideations  Q 7 min checks maintained to monitor pt's behavior & safety  Pt c/o mild anxiety & mild depression over requesting discharge as soon as possible  Pt is pleasant & socializes with other patients  Pt is cooperative & compliant with medications

## 2022-12-14 NOTE — PROGRESS NOTES
Progress Note - Adelaide Fabry III 45 y o  male MRN: [id number]    Unit/Bed#: Claudeen Cullens 209-02 Encounter: [id number]        Subjective:   Patient seen and examined at bedside after reviewing the chart and discussing the case with the caring staff  Patient examined at bedside  Patient has no acute issues  Physical Exam   Vitals: Blood pressure 139/76, pulse 63, temperature 99 °F (37 2 °C), temperature source Temporal, resp  rate 16, height 6' (1 829 m), weight 81 6 kg (180 lb), SpO2 100 %  ,Body mass index is 24 41 kg/m²  Constitutional: He appears well-developed  HEENT: PERR, EOMI, MMM  Cardiovascular: Normal rate and regular rhythm  Pulmonary/Chest: Effort normal and breath sounds normal    Abdomen: Soft, + BS, NT    Assessment/Plan:  Adelaide Fabry III is a(n) 46 y o  male with MDD      1  Cardiac with history of HTN, HLD  Patient is on amlodipine 5 mg daily  Crestor not available in hospital   Pravastatin 40 mg daily  2  GERD  Continue famotidine 20 mg twice daily, omeprazole 40 mg daily  3  Alcohol abuse  Patient started on multivitamin, thiamine, folic acid  And is also on ReVia  4  Ankylosing spondylitis  Stable  Patient reports following with outpatient rheumatology  5  DJD/OA  Patient may take Tylenol as needed  6  Primary adenocarcinoma of RUL s/p lobectomy 6/7/2021  Patient is being followed by cardiothoracic surgery  CT chest 10/3/2022 without evidence of recurrent tumor or thoracic metastatic disease  7   New vitamin D deficiency  I will put the patient on vitamin D bolus doses for 8 weeks followed by vitamin D3 1000 units daily

## 2022-12-14 NOTE — NURSING NOTE
Patient was visible on the milieu this evening, socializing w/ peers  Patient denies all psych symptoms  Patient is cooperative, pleasant, and med compliant  Will CTM  Continuous patient safety checks ongoing

## 2022-12-14 NOTE — PROGRESS NOTES
12/14/22 1400   Activity/Group Checklist   Group   ([employer] [facility] Art Therapy)   Attendance Attended   Attendance Duration (min) Greater than 60   Interactions Interacted appropriately   Affect/Mood Appropriate;Bright;Calm   Goals Achieved Identified feelings; Identified relapse prevention strategies; Discussed coping strategies; Increased hopefulness; Able to listen to others; Identified resources and support systems; Able to engage in interactions; Able to reflect/comment on own behavior;Able to manage/cope with feelings

## 2022-12-14 NOTE — NURSING NOTE
Pt states " I feel better & have less anxiety now"  Pt received Risperidone 1 mg po @ 8485 with good results obtained  Q 7 min checks maintained to monitor pt's behavior & safety

## 2022-12-14 NOTE — PLAN OF CARE
Problem: Ineffective Coping  Goal: Participates in unit activities  Description: Interventions:  - Provide therapeutic environment   - Provide required programming   - Redirect inappropriate behaviors   Outcome: Progressing   Patient is out for groups and openly interactive

## 2022-12-15 ENCOUNTER — TELEPHONE (OUTPATIENT)
Dept: PSYCHIATRY | Facility: CLINIC | Age: 52
End: 2022-12-15

## 2022-12-15 RX ORDER — QUETIAPINE FUMARATE 50 MG/1
50 TABLET, FILM COATED ORAL DAILY
Qty: 30 TABLET | Refills: 0 | Status: SHIPPED | OUTPATIENT
Start: 2022-12-15 | End: 2022-12-21 | Stop reason: SDUPTHER

## 2022-12-15 RX ORDER — FAMOTIDINE 20 MG/1
20 TABLET, FILM COATED ORAL 2 TIMES DAILY
Qty: 30 TABLET | Refills: 0 | Status: SHIPPED | OUTPATIENT
Start: 2022-12-15

## 2022-12-15 RX ORDER — THIAMINE MONONITRATE (VIT B1) 100 MG
100 TABLET ORAL DAILY
Qty: 30 TABLET | Refills: 0 | Status: SHIPPED | OUTPATIENT
Start: 2022-12-15 | End: 2023-01-14

## 2022-12-15 RX ORDER — AMLODIPINE BESYLATE 5 MG/1
5 TABLET ORAL DAILY
Qty: 30 TABLET | Refills: 0 | Status: SHIPPED | OUTPATIENT
Start: 2022-12-15

## 2022-12-15 RX ORDER — ROSUVASTATIN CALCIUM 5 MG/1
5 TABLET, COATED ORAL DAILY
Qty: 90 TABLET | Refills: 0 | Status: SHIPPED | OUTPATIENT
Start: 2022-12-15

## 2022-12-15 RX ORDER — FOLIC ACID 1 MG/1
1 TABLET ORAL DAILY
Qty: 30 TABLET | Refills: 0 | Status: SHIPPED | OUTPATIENT
Start: 2022-12-15 | End: 2023-01-14

## 2022-12-15 RX ORDER — TRAZODONE HYDROCHLORIDE 100 MG/1
100 TABLET ORAL
Qty: 30 TABLET | Refills: 0 | Status: SHIPPED | OUTPATIENT
Start: 2022-12-15 | End: 2023-01-14

## 2022-12-15 RX ORDER — MELATONIN
1000 DAILY
Qty: 30 TABLET | Refills: 0 | Status: SHIPPED | OUTPATIENT
Start: 2023-01-28

## 2022-12-15 RX ORDER — LANOLIN ALCOHOL/MO/W.PET/CERES
3 CREAM (GRAM) TOPICAL
Qty: 30 TABLET | Refills: 0 | Status: SHIPPED | OUTPATIENT
Start: 2022-12-15 | End: 2023-01-14

## 2022-12-15 RX ORDER — QUETIAPINE FUMARATE 100 MG/1
100 TABLET, FILM COATED ORAL
Qty: 30 TABLET | Refills: 0 | Status: SHIPPED | OUTPATIENT
Start: 2022-12-15 | End: 2022-12-21 | Stop reason: DRUGHIGH

## 2022-12-15 RX ORDER — ERGOCALCIFEROL 1.25 MG/1
50000 CAPSULE ORAL WEEKLY
Qty: 4 CAPSULE | Refills: 0 | Status: SHIPPED | OUTPATIENT
Start: 2022-12-17 | End: 2023-01-29

## 2022-12-15 RX ORDER — TOPIRAMATE 100 MG/1
100 TABLET, FILM COATED ORAL 2 TIMES DAILY
Qty: 60 TABLET | Refills: 0 | Status: SHIPPED | OUTPATIENT
Start: 2022-12-15 | End: 2023-01-14

## 2022-12-15 RX ORDER — NALTREXONE HYDROCHLORIDE 50 MG/1
50 TABLET, FILM COATED ORAL DAILY
Qty: 30 TABLET | Refills: 0 | Status: SHIPPED | OUTPATIENT
Start: 2022-12-15 | End: 2023-01-14

## 2022-12-15 RX ADMIN — RISPERIDONE 0.25 MG: 0.25 TABLET ORAL at 20:13

## 2022-12-15 RX ADMIN — QUETIAPINE FUMARATE 100 MG: 100 TABLET ORAL at 21:40

## 2022-12-15 RX ADMIN — FAMOTIDINE 20 MG: 20 TABLET, FILM COATED ORAL at 09:19

## 2022-12-15 RX ADMIN — NALTREXONE HYDROCHLORIDE 50 MG: 50 TABLET, FILM COATED ORAL at 09:19

## 2022-12-15 RX ADMIN — FOLIC ACID 1 MG: 1 TABLET ORAL at 09:19

## 2022-12-15 RX ADMIN — THIAMINE HCL TAB 100 MG 100 MG: 100 TAB at 09:19

## 2022-12-15 RX ADMIN — FAMOTIDINE 20 MG: 20 TABLET, FILM COATED ORAL at 16:55

## 2022-12-15 RX ADMIN — TRAZODONE HYDROCHLORIDE 100 MG: 100 TABLET ORAL at 21:40

## 2022-12-15 RX ADMIN — TOPIRAMATE 100 MG: 100 TABLET, FILM COATED ORAL at 09:19

## 2022-12-15 RX ADMIN — HYDROXYZINE HYDROCHLORIDE 25 MG: 25 TABLET, FILM COATED ORAL at 18:51

## 2022-12-15 RX ADMIN — Medication 3 MG: at 21:40

## 2022-12-15 RX ADMIN — PRAVASTATIN SODIUM 40 MG: 40 TABLET ORAL at 16:56

## 2022-12-15 RX ADMIN — AMLODIPINE BESYLATE 5 MG: 5 TABLET ORAL at 09:20

## 2022-12-15 RX ADMIN — Medication 1 TABLET: at 09:19

## 2022-12-15 RX ADMIN — QUETIAPINE FUMARATE 50 MG: 50 TABLET ORAL at 09:19

## 2022-12-15 RX ADMIN — TOPIRAMATE 100 MG: 100 TABLET, FILM COATED ORAL at 16:56

## 2022-12-15 NOTE — PROGRESS NOTES
Progress Note - Arturo Weldon III 45 y o  male MRN: [id number]    Unit/Bed#: Pam Martinez 209-02 Encounter: [id number]        Subjective:   Patient seen and examined at bedside after reviewing the chart and discussing the case with the caring staff  Patient examined at bedside  Patient has no acute issues  Patient is a possible discharge tomorrow 12/16/2022  Patient is requesting all his prescriptions  I reviewed and reconciled patient's problem list and medications  Physical Exam   Vitals: Blood pressure 147/79, pulse 68, temperature (!) 97 4 °F (36 3 °C), temperature source Temporal, resp  rate 18, height 6' (1 829 m), weight 81 6 kg (180 lb), SpO2 95 %  ,Body mass index is 24 41 kg/m²  Constitutional: He appears well-developed  HEENT: PERR, EOMI, MMM  Cardiovascular: Normal rate and regular rhythm  Pulmonary/Chest: Effort normal and breath sounds normal    Abdomen: Soft, + BS, NT    Assessment/Plan:  Arturo Weldon III is a(n) 46 y o  male with MDD  Medical clearance  Patient is medically cleared for discharge  All scripts will be sent out for the patient      1  Cardiac with history of HTN, HLD  Patient is on amlodipine 5 mg daily  Crestor not available in hospital   Pravastatin 40 mg daily  2  GERD  Continue famotidine 20 mg twice daily, omeprazole 40 mg daily  3  Alcohol abuse  Patient started on multivitamin, thiamine, folic acid  And is also on ReVia  4  Ankylosing spondylitis  Stable  Patient reports following with outpatient rheumatology  5  DJD/OA  Patient may take Tylenol as needed  6  Primary adenocarcinoma of RUL s/p lobectomy 6/7/2021  Patient is being followed by cardiothoracic surgery  CT chest 10/3/2022 without evidence of recurrent tumor or thoracic metastatic disease  7   New vitamin D deficiency  I will put the patient on vitamin D bolus doses for 8 weeks followed by vitamin D3 1000 units daily

## 2022-12-15 NOTE — PROGRESS NOTES
12/15/22 4204   Activity/Group Checklist   Group Community meeting   Attendance Attended   Attendance Duration (min) 46-60   Interactions Interacted appropriately   Affect/Mood Appropriate   Goals Achieved Identified feelings; Able to listen to others; Able to engage in interactions; Able to self-disclose; Able to recieve feedback

## 2022-12-15 NOTE — PROGRESS NOTES
12/15/22 1400   Activity/Group Checklist   Group   (Art Therapy: Community Building and Support)   Attendance Attended   Attendance Duration (min) 46-60   Interactions Interacted appropriately   Affect/Mood Appropriate;Bright;Calm   Goals Achieved Identified feelings; Discussed coping strategies; Able to listen to others; Able to engage in interactions; Able to reflect/comment on own behavior;Able to manage/cope with feelings; Able to recieve feedback; Able to give feedback to another

## 2022-12-15 NOTE — DISCHARGE INSTR - OTHER ORDERS
Marian Omalley, you are being discharged to your home at [address] [id number]  Your phone number is [phone]  Triggers you have identified during your hospitalization that led to your admission include distressed mood and hopelessness  Coping skills you have identified during your hospitalization include spending time with your grandchildren, archery, watching TV  If you are unable to deal with your distressed mood alone please contact your daughter Mal at [phone] or other family support, your psychiatric provider Jojo Rai at [phone],  or your primary care provider, Dr Nina Mcnair at [phone]  If that is not effective and you continue to have a distressed mood, or are in crisis, please contact [facility]: [phone], dial 911 or go to the nearest emergency center  [facility] Crisis: [phone]  [facility]: Phone: [phone]:  [phone]  *Alcohol Anonymous: [address] on [address] [facility] ([facility]) [employer]: 123.885.1554/Website: www karol org  *Substance Abuse and [address] [facility]) [employer], which is a confidential, free, 24-hour-a-day, 365-day-a-year, information service for individuals and family members facing mental health and/or substance use disorders  This service provides referrals to local treatment facilities, support groups, and community-based organizations  Callers can also order free publications and other information  Call 8-014-362-134-870-0312/Website: www [facility] gov  *[employer] 2-1-1: This is a toll free, confidential, 24-hour-a-day service which connects you to a community  in your area who can help you find services and resources that are available to you locally and provide critical services that can improve and save lives    Call: 211  /Website: https://victor hugoHemophilia Resources of [place]usra wheatley/ Isaac Gutierrez or Monica, our Jose and Chidi, will be calling you after your discharge, on the phone number that you provided  They will be available as an additional support, if needed  If you wish to speak with one of them, you may contact Army Martin at [phone] or Albino Horton at [phone]    You are being discharged to

## 2022-12-15 NOTE — PROGRESS NOTES
12/15/22   Team Meeting   Meeting Type Daily Rounds   Team Members Present   Team Members Present Physician;Nurse;   Physician Team Member Dr Wen Clement MD; [id number]   Nursing Team Member [facility] RN; Spencer Redd RN, [facility]   Social Work Team Member [id number]   OT Team Member Denisa Jennings CTRS   Patient/Family Present   Patient Present No   Patient's Family Present No     Pt d/c tomorrow  Focused on taking care of self per pt

## 2022-12-15 NOTE — NURSING NOTE
Patient is pleasant and appropriate in conversation  He attends group and is social with peers  Reports good sleep and good appetite  Denies all psychiatric symptoms  No complaints of pain  Will continue monitoring

## 2022-12-15 NOTE — NURSING NOTE
No complaints or acute behaviors noted overnight  No ETOH withdrawal symptoms noted  CIWA 0  Fluids maintained at bedside  Observed sleeping during q 7 minute safety checks

## 2022-12-15 NOTE — BH TRANSITION RECORD
Contact Information: If you have any questions, concerns, pended studies, tests and/or procedures, or emergencies regarding your inpatient behavioral health visit  Please contact [facility] older adult behavioral health unit [phone] and ask to speak to a , nurse or physician  A contact is available 24 hours/ 7 days a week at this number  Summary of Procedures Performed During your Stay:  Below is a list of major procedures performed during your hospital stay and a summary of results:  - Major Imaging Studies: EKG  Pending Studies (From admission, onward)    None        Please follow up on the above pending studies with your PCP and/or referring provider

## 2022-12-15 NOTE — TELEPHONE ENCOUNTER
Patient due to be discharged  Has been scheduled w   Kathleen Chew for appt 1/17 @ 9a w  4 wk f/u 2/14 @ 11a

## 2022-12-15 NOTE — PLAN OF CARE

## 2022-12-15 NOTE — PROGRESS NOTES
Progress Note - Behavioral Health   This note was not shared with the patient due to reasonable likelihood of causing patient harm  Joanne Bailey III 45 y o  male MRN: [id number]   Unit/Bed#: Romulo Meza 209-02 Encounter: [id number]    Behavior over the last 24 hours: improved  Romana Gondola seen today, appears calm, cooperative and looking forward for his discharge tomorrow  Denies any SI consistently and states he is better in coping with stressors  Agrees to maintain sobriety following discharge  Meds education and discharge plan were discussed  Sleep: improved  Appetite: normal  Medication side effects: denies  ROS: no complaints, all other systems are negative    Mental Status Evaluation:    Appearance:  age appropriate   Behavior:  cooperative   Speech:  normal rate and volume   Mood:  improved   Affect:  appropriate   Thought Process:  goal directed   Associations: intact associations   Thought Content:  no overt delusions   Perceptual Disturbances: none   Risk Potential: Suicidal ideation - None  Homicidal ideation - None   Sensorium:  oriented to person, place and time/date   Memory:  recent and remote memory grossly intact   Consciousness:  alert and awake   Attention: attention span and concentration are age appropriate   Insight:  improved   Judgment: fair   Gait/Station: normal gait/station   Motor Activity: no abnormal movements     Vital signs in last 24 hours:    Temp:  [97 4 °F (36 3 °C)-98 4 °F (36 9 °C)] 97 4 °F (36 3 °C)  HR:  [68-71] 68  Resp:  [16-18] 18  BP: (142-168)/(79-90) 147/79    Laboratory results:   I have personally reviewed all pertinent laboratory/tests results    Most Recent Labs:   Lab Results   Component Value Date    WBC 5 06 12/09/2022    RBC 4 64 12/09/2022    HGB 15 0 12/09/2022    HCT 44 8 12/09/2022     12/09/2022    RDW 13 3 12/09/2022    NEUTROABS 3 09 12/09/2022    SODIUM 140 12/09/2022    K 3 5 12/09/2022     12/09/2022    CO2 27 12/09/2022    BUN 15 12/09/2022 CREATININE 0 84 12/09/2022    GLUC 87 12/09/2022    GLUF 87 12/09/2022    CALCIUM 9 3 12/09/2022    AST 25 06/19/2022    ALT 36 06/19/2022    ALKPHOS 99 06/19/2022    TP 7 9 06/19/2022    ALB 4 4 06/19/2022    TBILI 0 84 06/19/2022    CHOLESTEROL 180 12/09/2022    HDL 65 12/09/2022    TRIG 122 12/09/2022    LDLCALC 91 12/09/2022    NONHDLC 115 12/09/2022    [id number] 2 818 08/10/2021    RPR Non-Reactive 12/09/2022    HGBA1C 6 5 10/27/2022     02/09/2016       Assessment/Plan   Principal Problem:    MDD (major depressive disorder), recurrent episode, severe (HCC)  Active Problems:    HLA B27 positive    Anxiety    Hyperlipidemia    Hypertension    Gastroesophageal reflux disease    History of colon polyps    Centrilobular emphysema (HCC)    Ankylosing spondylitis of cervical region [facility])    Primary adenocarcinoma of upper lobe of right lung (HCC)    Moderate alcohol use disorder ([facility] )    Recommended Treatment:     Planned medication and treatment changes:     All current active medications have been reviewed  Encourage group therapy, milieu therapy and occupational therapy  [address] checks every 7 minutes  Discharge planned for tomorrow  Current Facility-Administered Medications   Medication Dose Route Frequency Provider Last Rate   • aluminum-magnesium hydroxide-simethicone  30 mL Oral Q4H PRN Joaquín Nino MD     • amLODIPine  5 mg Oral Daily Joaquín Nino MD     • [START ON 1/28/2023] cholecalciferol  1,000 Units Oral Daily Clayton Shearer MD     • ergocalciferol  50,000 Units Oral Weekly Clayton Shearer MD     • famotidine  20 mg Oral BID Joaquín Nino MD     • folic acid  1 mg Oral Daily Giselle De La Cruz PA-C     • haloperidol lactate  5 mg Intramuscular Q4H PRN Max 4/day Joaquín Nino MD     • hydrOXYzine HCL  25 mg Oral Q6H PRN Max 4/day Joaquín Nino MD     • LORazepam  1 mg Intramuscular Q6H PRN Max 3/day Joaquín Nino MD     • LORazepam  1 mg Oral Q8H PRN Ronal Pointkatherine MD     • melatonin  3 mg Oral HS Mat Urban MD     • multivitamin-minerals  1 tablet Oral Daily Giselle De La Cruz PA-C     • naltrexone  50 mg Oral Daily Autumn Martin MD     • nicotine polacrilex  4 mg Oral Q2H PRN Mat Urban MD     • pravastatin  40 mg Oral Daily With Azar De La Cruz PA-C     • QUEtiapine  100 mg Oral HS Autumn Martin MD     • QUEtiapine  50 mg Oral Daily CHOLO Cowart     • risperiDONE  0 25 mg Oral Q4H PRN Max 6/day Mat Urban MD     • risperiDONE  0 5 mg Oral Q4H PRN Max 3/day Mat Urban MD     • risperiDONE  1 mg Oral Q2H PRN Max 3/day Mat Urban MD     • thiamine  100 mg Oral Daily Giselle De La Cruz PA-C     • topiramate  100 mg Oral BID Autumn Martin MD     • traZODone  100 mg Oral HS Mat Urban MD         Risks / Benefits of Treatment:    Risks, benefits, and possible side effects of medications explained to patient and patient verbalizes understanding and agreement for treatment  Counseling / Coordination of Care:    Patient's progress discussed with staff in treatment team meeting  Medications, treatment progress and treatment plan reviewed with patient  Supportive therapy provided to patient  Discharge plan discussed with patient      Joanie Bacon MD 12/15/22

## 2022-12-15 NOTE — NURSING NOTE
No complaints of anxiety or depression noted  Affect brighter tonight  Denied any suicidal ideations or withdrawal symptoms this evening  Complaint with medications and evening snack  Maintained on q 7 minute safety checks

## 2022-12-16 VITALS
DIASTOLIC BLOOD PRESSURE: 66 MMHG | BODY MASS INDEX: 24.38 KG/M2 | TEMPERATURE: 98 F | SYSTOLIC BLOOD PRESSURE: 150 MMHG | WEIGHT: 180 LBS | HEIGHT: 72 IN | HEART RATE: 63 BPM | RESPIRATION RATE: 17 BRPM | OXYGEN SATURATION: 96 %

## 2022-12-16 PROBLEM — M45.2 ANKYLOSING SPONDYLITIS OF CERVICAL REGION (HCC): Status: RESOLVED | Noted: 2021-06-07 | Resolved: 2022-12-16

## 2022-12-16 PROBLEM — F33.2 MDD (MAJOR DEPRESSIVE DISORDER), RECURRENT EPISODE, SEVERE (HCC): Status: RESOLVED | Noted: 2022-10-06 | Resolved: 2022-12-16

## 2022-12-16 PROBLEM — F10.20 MODERATE ALCOHOL USE DISORDER (HCC): Chronic | Status: RESOLVED | Noted: 2022-12-08 | Resolved: 2022-12-16

## 2022-12-16 RX ADMIN — FOLIC ACID 1 MG: 1 TABLET ORAL at 08:15

## 2022-12-16 RX ADMIN — NALTREXONE HYDROCHLORIDE 50 MG: 50 TABLET, FILM COATED ORAL at 08:15

## 2022-12-16 RX ADMIN — TOPIRAMATE 100 MG: 100 TABLET, FILM COATED ORAL at 08:15

## 2022-12-16 RX ADMIN — PRAVASTATIN SODIUM 40 MG: 40 TABLET ORAL at 16:03

## 2022-12-16 RX ADMIN — Medication 1 TABLET: at 08:15

## 2022-12-16 RX ADMIN — THIAMINE HCL TAB 100 MG 100 MG: 100 TAB at 08:15

## 2022-12-16 RX ADMIN — FAMOTIDINE 20 MG: 20 TABLET, FILM COATED ORAL at 08:15

## 2022-12-16 RX ADMIN — AMLODIPINE BESYLATE 5 MG: 5 TABLET ORAL at 08:15

## 2022-12-16 RX ADMIN — QUETIAPINE FUMARATE 50 MG: 50 TABLET ORAL at 08:15

## 2022-12-16 NOTE — PROGRESS NOTES
12/16/22   Team Meeting   Meeting Type Daily Rounds   Team Members Present   Team Members Present Physician;Nurse;; Occupational Therapist   Physician Team Member Dr Akua Montero MD; [id number]   Nursing Team Member Winston Rebolledo RN; Martine Olguin RN, [facility]   Social Work Team Member [id number]   OT Team Member Re LUZ   Patient/Family Present   Patient Present No   Patient's Family Present No     Pt reported mild anx last night  Risperidone effective  Pt denied all  D/C today 4:30 p m

## 2022-12-16 NOTE — PROGRESS NOTES
12/16/22 1400   Activity/Group Checklist   Group   (Creative Expressions Team Building)   Attendance Attended   Attendance Duration (min) Greater than 60   Interactions Interacted appropriately   Affect/Mood Appropriate;Bright;Calm   Goals Achieved Identified feelings; Identified triggers; Identified relapse prevention strategies; Discussed coping strategies; Discussed discharge plans; Increased hopefulness; Identified resources and support systems; Able to listen to others; Able to engage in interactions; Able to manage/cope with feelings; Able to recieve feedback

## 2022-12-16 NOTE — NURSING NOTE
Patient was observed to be visible in the community this evening  He was pleasant and cooperative during staff interactions, however became restless and mildly agitated informing he was getting worked up over his upcoming discharge tomorrow  PRN Risperdal given at 2013 was effective in relieving agitation this evening   Denies SI, HI and hallucinations  Continuous safety rounding in progress

## 2022-12-16 NOTE — PROGRESS NOTES
Progress Note - Vicky Benton III 45 y o  male MRN: [id number]    Unit/Bed#: [facility] 209-02 Encounter: [id number]        Subjective:   Patient seen and examined at bedside after reviewing the chart and discussing the case with the caring staff  Patient examined at bedside  Patient has no acute issues  Patient is discharged today 12/16/2022  Physical Exam   Vitals: Blood pressure 117/70, pulse 66, temperature 97 6 °F (36 4 °C), temperature source Temporal, resp  rate 18, height 6' (1 829 m), weight 81 6 kg (180 lb), SpO2 96 %  ,Body mass index is 24 41 kg/m²  Constitutional: Patient in no acute distress  HEENT: PERR, EOMI, MMM  Cardiovascular: Normal rate and regular rhythm  Pulmonary/Chest: Effort normal and breath sounds normal    Abdomen: Soft, + BS, NT    Assessment/Plan:  Vicky Benton III is a(n) 46 y o  male with MDD  Medical clearance  Patient is medically cleared for discharge       1  Cardiac with history of HTN, HLD  Patient is on amlodipine 5 mg daily  Crestor not available in hospital   Pravastatin 40 mg daily  2  GERD  Continue famotidine 20 mg twice daily, omeprazole 40 mg daily  3  Alcohol abuse  Patient started on multivitamin, thiamine, folic acid, ReVia 50 mg daily  4  Ankylosing spondylitis  Stable  Patient reports following with outpatient rheumatology  5  DJD/OA  Patient may take Tylenol as needed  6  Primary adenocarcinoma of RUL s/p lobectomy 6/7/2021  Patient is being followed by cardiothoracic surgery  CT chest 10/3/2022 without evidence of recurrent tumor or thoracic metastatic disease  7   Vitamin D deficiency  Patient started on vitamin D bolus doses for 8 weeks followed by vitamin D3 1000 units daily  The patient was discussed with Dr Maria E Tubbs and he is in agreement with the above note

## 2022-12-16 NOTE — DISCHARGE SUMMARY
Discharge Summary - [address]  [facility] III 45 y o  male MRN: [id number]  Unit/Bed#: [facility] 209-02 Encounter: [id number]  This note was not shared with the patient due to reasonable likelihood of causing patient harm     Admission Date: 12/8/2022         Discharge Date: 12/16/2022    Attending Psychiatrist: Samia Maddox MD    Reason for Admission/HPI: Major depressive disorder [F32 9]    History of Present Illness:       Aamir Chao III is a 46 y o  male with a history of depression, anxiety and alcohol use, h/o lung surgery due to nodule who was admitted to the inpatient older adult psychiatric unit on a voluntary 61 51 81 commitment basis due to significant worsening of depression, anxiety, unstable mood and alcohol abuse  UDS was negative  On evaluation in the inpatient psychiatric unit Lucero Melchor reports increased depressed mood, anxiety, panic attacks, decreased sleep and appetite, increase hopeless and helplessness along with excessive alcohol misuse almost on a daily basis  Patient admits to intermittent episodes of passive wish to die but denies any active plan or intention to hurt himslef and is able to contract for safety  Denies any current paranoia, homicidal ideation or hallucination  States feeling anxious and restless that could be related to his recent  increased alcohol use but denies any past history of severe alcohol withdrawal  Major stressors are relationship issues with his wife, possible divorce and medical conditions  Patient agrees to be compliant with medication and treatment plan  Hospital Course: The patient was admitted to the inpatient psychiatric unit and started on every 7 minutes precautions  During the hospitalization the patient was attending individual therapy, group therapy, milieu therapy and occupational therapy  Psychiatric medications were titrated over the hospital stay   To address mood instability, anxiety symptoms, potential alcohol withdrawal symptoms and insomnia the patient was started on mood stabilizer Topamax, antipsychotic medication Seroquel, hypnotic medication Trazodone and Melatonin and medications to control alcohol withdrawal Naltrexone  Medication doses were titrated during the hospital course  Prior to beginning of treatment medications risks and benefits and possible side effects including risk of parkinsonian symptoms, Tardive Dyskinesia and metabolic syndrome related to treatment with antipsychotic medications, risk of cardiovascular events in elderly related to treatment with antipsychotic medications, risk of suicidality and serotonin syndrome related to treatment with antidepressants and risks of misuse, abuse or dependence, sedation and respiratory depression related to treatment with benzodiazepine medications were reviewed with the patient  The patient verbalized understanding and agreement for treatment  Patient's symptoms improved gradually over the hospital course  At the end of treatment the patient was doing well  Mood was stable at the time of discharge  The patient denied suicidal ideation, intent or plan at the time of discharge and denied homicidal ideation, intent or plan at the time of discharge  There was no overt psychosis at the time of discharge  Sleep and appetite were improved  The patient was tolerating medications and was not reporting any significant side effects at the time of discharge  Since the patient was doing well at the end of the hospitalization, treatment team felt that the patient could be safely discharged to outpatient care  The outpatient follow up was arranged by the unit  upon discharge      Mental Status at time of Discharge:     Appearance:  age appropriate   Behavior:  cooperative   Speech:  normal volume   Mood:  improved   Affect:  mood-congruent   Thought Process:  goal directed   Thought Content:  normal   Perceptual Disturbances: None   Risk Potential: Suicidal Ideations none, HI none   Sensorium:  person, place, time/date and situation   Cognition:  recent and remote memory grossly intact   Consciousness:  alert and awake    Attention: attention span and concentration were age appropriate   Insight:  improved   Judgment: fair   Gait/Station: normal gait/station   Motor Activity: no abnormal movements     Admission Diagnosis:Major depressive disorder [F32 9]    Discharge Diagnosis:   Principal Problem (Resolved):    MDD (major depressive disorder), recurrent episode, severe ([facility] )  Active Problems:    HLA B27 positive    Hyperlipidemia    Hypertension    Gastroesophageal reflux disease    History of colon polyps    Centrilobular emphysema ([facility] )    Primary adenocarcinoma of upper lobe of right lung ([facility] )  Resolved Problems:    Anxiety    Ankylosing spondylitis of cervical region ([facility] )    Moderate alcohol use disorder ([place] [id number] )      Lab results:  Admission on 12/08/2022   Component Date Value   • Vitamin B-12 12/09/2022 1,088 (H)    • Folate 12/09/2022 >20 0 (H)    • Vit D, 25-Hydroxy 12/09/2022 26 9 (L)    • RPR 12/09/2022 Non-Reactive    • Sodium 12/09/2022 140    • Potassium 12/09/2022 3 5    • Chloride 12/09/2022 105    • CO2 12/09/2022 27    • ANION GAP 12/09/2022 8    • BUN 12/09/2022 15    • Creatinine 12/09/2022 0 84    • Glucose 12/09/2022 87    • Glucose, Fasting 12/09/2022 87    • Calcium 12/09/2022 9 3    • eGFR 12/09/2022 99    • WBC 12/09/2022 5 06    • RBC 12/09/2022 4 64    • Hemoglobin 12/09/2022 15 0    • Hematocrit 12/09/2022 44 8    • MCV 12/09/2022 97    • MCH 12/09/2022 32 3    • MCHC 12/09/2022 33 5    • RDW 12/09/2022 13 3    • MPV 12/09/2022 8 8 (L)    • Platelets 53/64/2139 264    • nRBC 12/09/2022 0    • Neutrophils Relative 12/09/2022 61    • Immat GRANS % 12/09/2022 0    • Lymphocytes Relative 12/09/2022 23    • Monocytes Relative 12/09/2022 12    • Eosinophils Relative 12/09/2022 3    • Basophils Relative 12/09/2022 1    • Neutrophils Absolute 12/09/2022 3 09    • Immature Grans Absolute 12/09/2022 0 01    • Lymphocytes Absolute 12/09/2022 1 14    • Monocytes Absolute 12/09/2022 0 61    • Eosinophils Absolute 12/09/2022 0 14    • Basophils Absolute 12/09/2022 0 07    • Cholesterol 12/09/2022 180    • Triglycerides 12/09/2022 122    • HDL, Direct 12/09/2022 65    • LDL Calculated 12/09/2022 91    • Non-HDL-Chol (CHOL-HDL) 12/09/2022 115    • Ventricular Rate 12/09/2022 78    • Atrial Rate 12/09/2022 78    • WV Interval 12/09/2022 142    • QRSD Interval 12/09/2022 148    • QT Interval 12/09/2022 406    • QTC Interval 12/09/2022 462    • P Axis 12/09/2022 69    • QRS Axis 12/09/2022 -54    • T Wave Axis 12/09/2022 36        Discharge Medications:  Current Discharge Medication List      START taking these medications    Details   cholecalciferol (VITAMIN D3) 1,000 units tablet Take 1 tablet (1,000 Units total) by mouth daily Do not start before January 28, 2023  Qty: 30 tablet, Refills: 0    Associated Diagnoses: Vitamin D deficiency      ergocalciferol (VITAMIN D2) 50,000 units Take 1 capsule (50,000 Units total) by mouth once a week for 7 doses Do not start before December 17, 2022  Qty: 4 capsule, Refills: 0    Associated Diagnoses: Vitamin D deficiency      folic acid (FOLVITE) 1 mg tablet Take 1 tablet (1 mg total) by mouth daily  Qty: 30 tablet, Refills: 0    Associated Diagnoses: Moderate alcohol use disorder (HCC)      melatonin 3 mg Take 1 tablet (3 mg total) by mouth daily at bedtime  Qty: 30 tablet, Refills: 0    Associated Diagnoses: Primary insomnia; MDD (major depressive disorder), recurrent episode, severe (HCC)      naltrexone (REVIA) 50 mg tablet Take 1 tablet (50 mg total) by mouth daily  Qty: 30 tablet, Refills: 0    Associated Diagnoses: Moderate alcohol use disorder (HCC)      thiamine (VITAMIN B1) 100 mg tablet Take 1 tablet (100 mg total) by mouth daily  Qty: 30 tablet, Refills: 0    Associated Diagnoses:  Moderate alcohol use disorder (HCC) Current Discharge Medication List      STOP taking these medications       clonazePAM (KlonoPIN) 0 5 mg tablet Comments:   Reason for Stopping:         omeprazole (PriLOSEC) 40 MG capsule Comments:   Reason for Stopping:              Current Discharge Medication List      CONTINUE these medications which have CHANGED    Details   amLODIPine (NORVASC) 5 mg tablet Take 1 tablet (5 mg total) by mouth daily  Qty: 30 tablet, Refills: 0    Associated Diagnoses: Essential hypertension      famotidine (PEPCID) 20 mg tablet Take 1 tablet (20 mg total) by mouth 2 (two) times a day  Qty: 30 tablet, Refills: 0    Associated Diagnoses: Abdominal pain      Multiple Vitamins-Minerals (Multi Complete) CAPS Take 1 capsule by mouth daily  Qty: 30 capsule, Refills: 0    Associated Diagnoses: Vitamin D deficiency      !! QUEtiapine (SEROquel) 100 mg tablet Take 1 tablet (100 mg total) by mouth daily at bedtime  Qty: 30 tablet, Refills: 0    Associated Diagnoses: MDD (major depressive disorder), recurrent episode, severe (HCC)      !! QUEtiapine (SEROquel) 50 mg tablet Take 1 tablet (50 mg total) by mouth daily  Qty: 30 tablet, Refills: 0    Associated Diagnoses: MDD (major depressive disorder), recurrent episode, severe (HCC)      rosuvastatin (CRESTOR) 5 mg tablet Take 1 tablet (5 mg total) by mouth daily  Qty: 90 tablet, Refills: 0    Associated Diagnoses: Coronary artery disease of native heart with stable angina pectoris, unspecified vessel or lesion type (HCC)      topiramate (TOPAMAX) 100 mg tablet Take 1 tablet (100 mg total) by mouth 2 (two) times a day  Qty: 60 tablet, Refills: 0    Associated Diagnoses: MDD (major depressive disorder), recurrent episode, severe ([facility] ); Anxiety      traZODone (DESYREL) 100 mg tablet Take 1 tablet (100 mg total) by mouth daily at bedtime  Qty: 30 tablet, Refills: 0    Associated Diagnoses: Primary insomnia       !! - Potential duplicate medications found  Please discuss with provider  Current Discharge Medication List      CONTINUE these medications which have NOT CHANGED    Details   ibuprofen (MOTRIN) 600 mg tablet Take 1 tablet (600 mg total) by mouth every 6 (six) hours as needed for mild pain  Qty: 20 tablet, Refills: 0    Associated Diagnoses: Acute parotitis              Discharge instructions/Information to patient and family:   See after visit summary for information provided to patient and family  Provisions for Follow-Up Care:  See after visit summary for information related to follow-up care and any pertinent home health orders  Discharge Statement   I spent 30 minutes discharging the patient  This time was spent on the day of discharge  I had direct contact with the patient on the day of discharge  Additional documentation is required if more than 30 minutes were spent on discharge

## 2022-12-16 NOTE — NURSING NOTE
PRN Risperdal given at 2013 was effective in relieving agitation per patient  He presents as more calm and focused

## 2022-12-16 NOTE — NURSING NOTE
Pt is pleasant & socializes with other patients  Pt to be discharged home today  Pt denies any depression & c/o mild anxiety due to discharge  Pt denies any hallucinations, suicidal or homicidal ideations  Q 7 min checks maintained to monitor pt's behavior & safety  Pt up ad nelsy & gait is steady  Pt is cooperative & compliant with medications

## 2022-12-16 NOTE — NURSING NOTE
Pt instructed on discharge instructions & medications; & verbalized understanding of instructions  Pt awaiting transportation home with his son this evening

## 2022-12-16 NOTE — PROGRESS NOTES
12/16/22 0713   Activity/Group Checklist   Group Community meeting   Attendance Attended   Attendance Duration (min) 46-60   Interactions Interacted appropriately   Affect/Mood Appropriate   Goals Achieved Identified feelings; Able to listen to others; Able to engage in interactions; Able to self-disclose; Able to recieve feedback

## 2022-12-16 NOTE — NURSING NOTE
Patient was observed to be sleeping throughout the overnight hours  No complaints voiced  No acute behaviors observed  He remains in bed sleeping at this time  Continuous safety rounding in progress

## 2022-12-16 NOTE — NURSING NOTE
Patient c/o increased anxiety, stating he feels a bit worked up and restless  He informs the PRN Atarax given this evening was not effective in relieving his symptoms  Performed an agitation scale rating with a result of 20  Administered PRN Risperdal 0 25 mg as per patient request   Will monitor for medication effectiveness

## 2022-12-16 NOTE — NURSING NOTE
Pt discharged via ambulation via main entrance of hospital accompanied by BHT  Pt transported home via car with his son & belongings sent with pt

## 2022-12-16 NOTE — PLAN OF CARE
Problem: Depression  Goal: Verbalize thoughts and feelings  Description: Interventions:  - Assess and re-assess patient's level of risk   - Engage patient in 1:1 interactions, daily, for a minimum of 15 minutes   - Encourage patient to express feelings, fears, frustrations, hopes   Outcome: Progressing  Goal: Refrain from isolation  Description: Interventions:  - Develop a trusting relationship   - Encourage socialization   Outcome: Progressing  Goal: Refrain from self-neglect  Outcome: Progressing  Goal: Complete daily ADLs, including personal hygiene independently, as able  Description: Interventions:  - Observe, teach, and assist patient with ADLS  -  Monitor and promote a balance of rest/activity, with adequate nutrition and elimination   Outcome: Progressing     Problem: Anxiety  Goal: Anxiety is at manageable level  Description: Interventions:  - Assess and monitor patient's anxiety level  - Monitor for signs and symptoms (heart palpitations, chest pain, shortness of breath, headaches, nausea, feeling jumpy, restlessness, irritable, apprehensive)  - Collaborate with interdisciplinary team and initiate plan and interventions as ordered    - Alsey patient to unit/surroundings  - Explain treatment plan  - Encourage participation in care  - Encourage verbalization of concerns/fears  - Identify coping mechanisms  - Assist in developing anxiety-reducing skills  - Administer/offer alternative therapies  - Limit or eliminate stimulants  Outcome: Progressing     Problem: Ineffective Coping  Goal: Participates in unit activities  Description: Interventions:  - Provide therapeutic environment   - Provide required programming   - Redirect inappropriate behaviors   Outcome: Progressing     Problem: PAIN - ADULT  Goal: Verbalizes/displays adequate comfort level or baseline comfort level  Description: Interventions:  - Encourage patient to monitor pain and request assistance  - Assess pain using appropriate pain scale  - Administer analgesics based on type and severity of pain and evaluate response  - Implement non-pharmacological measures as appropriate and evaluate response  - Consider cultural and social influences on pain and pain management  - Notify physician/advanced practitioner if interventions unsuccessful or patient reports new pain  Outcome: Adequate for Discharge

## 2022-12-19 ENCOUNTER — TELEPHONE (OUTPATIENT)
Dept: PSYCHIATRY | Facility: CLINIC | Age: 52
End: 2022-12-19

## 2022-12-21 ENCOUNTER — RA CDI HCC (OUTPATIENT)
Dept: OTHER | Facility: HOSPITAL | Age: 52
End: 2022-12-21

## 2022-12-21 ENCOUNTER — TELEPHONE (OUTPATIENT)
Dept: FAMILY MEDICINE CLINIC | Facility: CLINIC | Age: 52
End: 2022-12-21

## 2022-12-21 ENCOUNTER — TRANSITIONAL CARE MANAGEMENT (OUTPATIENT)
Dept: FAMILY MEDICINE CLINIC | Facility: CLINIC | Age: 52
End: 2022-12-21

## 2022-12-21 DIAGNOSIS — F33.2 MDD (MAJOR DEPRESSIVE DISORDER), RECURRENT EPISODE, SEVERE (HCC): ICD-10-CM

## 2022-12-21 RX ORDER — QUETIAPINE FUMARATE 50 MG/1
50 TABLET, FILM COATED ORAL 2 TIMES DAILY
Qty: 60 TABLET | Refills: 0 | Status: SHIPPED | OUTPATIENT
Start: 2022-12-21 | End: 2023-01-20

## 2022-12-21 NOTE — TELEPHONE ENCOUNTER
Pt coming for TCM appt on 12/28, said he was given seroquel 50mg BID while admitted, looking to get this refilled until he comes in for visit to [id number]

## 2022-12-21 NOTE — PROGRESS NOTES
[facility]  coding opportunities       Chart reviewed, no opportunity found:   [address]        Patients Insurance     Medicare Insurance: [employer]

## 2022-12-28 ENCOUNTER — APPOINTMENT (EMERGENCY)
Dept: RADIOLOGY | Facility: HOSPITAL | Age: 52
End: 2022-12-28

## 2022-12-28 ENCOUNTER — OFFICE VISIT (OUTPATIENT)
Dept: FAMILY MEDICINE CLINIC | Facility: CLINIC | Age: 52
End: 2022-12-28

## 2022-12-28 ENCOUNTER — APPOINTMENT (EMERGENCY)
Dept: CT IMAGING | Facility: HOSPITAL | Age: 52
End: 2022-12-28

## 2022-12-28 ENCOUNTER — HOSPITAL ENCOUNTER (EMERGENCY)
Facility: HOSPITAL | Age: 52
Discharge: HOME | End: 2022-12-28
Attending: EMERGENCY MEDICINE

## 2022-12-28 VITALS
HEIGHT: 72 IN | BODY MASS INDEX: 23.76 KG/M2 | HEART RATE: 76 BPM | SYSTOLIC BLOOD PRESSURE: 126 MMHG | TEMPERATURE: 98.5 F | RESPIRATION RATE: 18 BRPM | DIASTOLIC BLOOD PRESSURE: 78 MMHG | WEIGHT: 175.4 LBS

## 2022-12-28 VITALS
TEMPERATURE: 99.4 F | RESPIRATION RATE: 20 BRPM | DIASTOLIC BLOOD PRESSURE: 87 MMHG | HEART RATE: 106 BPM | OXYGEN SATURATION: 95 % | SYSTOLIC BLOOD PRESSURE: 111 MMHG

## 2022-12-28 DIAGNOSIS — R91.8 PULMONARY NODULES: ICD-10-CM

## 2022-12-28 DIAGNOSIS — M79.622 LEFT UPPER ARM PAIN: Primary | ICD-10-CM

## 2022-12-28 DIAGNOSIS — S52.022A OLECRANON FRACTURE, LEFT, CLOSED, INITIAL ENCOUNTER: Primary | ICD-10-CM

## 2022-12-28 DIAGNOSIS — J43.2 CENTRILOBULAR EMPHYSEMA (HCC): ICD-10-CM

## 2022-12-28 DIAGNOSIS — F33.1 MAJOR DEPRESSIVE DISORDER, RECURRENT EPISODE, MODERATE (HCC): ICD-10-CM

## 2022-12-28 DIAGNOSIS — R07.81 RIB PAIN: ICD-10-CM

## 2022-12-28 DIAGNOSIS — S22.31XA CLOSED FRACTURE OF ONE RIB OF RIGHT SIDE, INITIAL ENCOUNTER: ICD-10-CM

## 2022-12-28 PROBLEM — F41.0 PANIC ATTACK: Status: RESOLVED | Noted: 2022-10-06 | Resolved: 2022-12-28

## 2022-12-28 PROBLEM — R10.11 RIGHT UPPER QUADRANT ABDOMINAL PAIN: Status: RESOLVED | Noted: 2022-07-12 | Resolved: 2022-12-28

## 2022-12-28 LAB
ABO GROUP BLD: NORMAL
ALBUMIN SERPL BCP-MCNC: 4.1 G/DL (ref 3.5–5)
ALP SERPL-CCNC: 90 U/L (ref 46–116)
ALT SERPL W P-5'-P-CCNC: 46 U/L (ref 12–78)
ANION GAP SERPL CALCULATED.3IONS-SCNC: 18 MMOL/L (ref 4–13)
APTT PPP: 23 SECONDS (ref 23–37)
AST SERPL W P-5'-P-CCNC: 42 U/L (ref 5–45)
BASOPHILS # BLD AUTO: 0.07 THOUSANDS/ÂΜL (ref 0–0.1)
BASOPHILS NFR BLD AUTO: 1 % (ref 0–1)
BILIRUB SERPL-MCNC: 0.88 MG/DL (ref 0.2–1)
BLD GP AB SCN SERPL QL: NEGATIVE
BUN SERPL-MCNC: 14 MG/DL (ref 5–25)
CALCIUM SERPL-MCNC: 8.4 MG/DL (ref 8.3–10.1)
CHLORIDE SERPL-SCNC: 96 MMOL/L (ref 96–108)
CK MB SERPL-MCNC: 2 NG/ML (ref 0–5)
CK MB SERPL-MCNC: <1 % (ref 0–2.5)
CK SERPL-CCNC: 252 U/L (ref 39–308)
CO2 SERPL-SCNC: 18 MMOL/L (ref 21–32)
CREAT SERPL-MCNC: 1.04 MG/DL (ref 0.6–1.3)
EOSINOPHIL # BLD AUTO: 0.01 THOUSAND/ÂΜL (ref 0–0.61)
EOSINOPHIL NFR BLD AUTO: 0 % (ref 0–6)
ERYTHROCYTE [DISTWIDTH] IN BLOOD BY AUTOMATED COUNT: 12.5 % (ref 11.6–15.1)
GFR SERPL CREATININE-BSD FRML MDRD: 81 ML/MIN/1.73SQ M
GLUCOSE SERPL-MCNC: 191 MG/DL (ref 65–140)
HCT VFR BLD AUTO: 47 % (ref 36.5–49.3)
HGB BLD-MCNC: 16.4 G/DL (ref 12–17)
IMM GRANULOCYTES # BLD AUTO: 0.04 THOUSAND/UL (ref 0–0.2)
IMM GRANULOCYTES NFR BLD AUTO: 0 % (ref 0–2)
INR PPP: 0.94 (ref 0.84–1.19)
LYMPHOCYTES # BLD AUTO: 0.96 THOUSANDS/ÂΜL (ref 0.6–4.47)
LYMPHOCYTES NFR BLD AUTO: 9 % (ref 14–44)
MCH RBC QN AUTO: 32.3 PG (ref 26.8–34.3)
MCHC RBC AUTO-ENTMCNC: 34.9 G/DL (ref 31.4–37.4)
MCV RBC AUTO: 93 FL (ref 82–98)
MONOCYTES # BLD AUTO: 0.7 THOUSAND/ÂΜL (ref 0.17–1.22)
MONOCYTES NFR BLD AUTO: 6 % (ref 4–12)
NEUTROPHILS # BLD AUTO: 9.16 THOUSANDS/ÂΜL (ref 1.85–7.62)
NEUTS SEG NFR BLD AUTO: 84 % (ref 43–75)
NRBC BLD AUTO-RTO: 0 /100 WBCS
PLATELET # BLD AUTO: 332 THOUSANDS/UL (ref 149–390)
PMV BLD AUTO: 8.2 FL (ref 8.9–12.7)
POTASSIUM SERPL-SCNC: 4 MMOL/L (ref 3.5–5.3)
PROT SERPL-MCNC: 7.5 G/DL (ref 6.4–8.4)
PROTHROMBIN TIME: 12.8 SECONDS (ref 11.6–14.5)
RBC # BLD AUTO: 5.07 MILLION/UL (ref 3.88–5.62)
RH BLD: POSITIVE
SODIUM SERPL-SCNC: 132 MMOL/L (ref 135–147)
SPECIMEN EXPIRATION DATE: NORMAL
WBC # BLD AUTO: 10.94 THOUSAND/UL (ref 4.31–10.16)

## 2022-12-28 RX ORDER — FENTANYL CITRATE 50 UG/ML
75 INJECTION, SOLUTION INTRAMUSCULAR; INTRAVENOUS ONCE
Status: COMPLETED | OUTPATIENT
Start: 2022-12-28 | End: 2022-12-28

## 2022-12-28 RX ORDER — LIDOCAINE 50 MG/G
2 PATCH TOPICAL ONCE
Status: DISCONTINUED | OUTPATIENT
Start: 2022-12-28 | End: 2022-12-28 | Stop reason: HOSPADM

## 2022-12-28 RX ORDER — KETOROLAC TROMETHAMINE 30 MG/ML
9 INJECTION, SOLUTION INTRAMUSCULAR; INTRAVENOUS ONCE
Status: COMPLETED | OUTPATIENT
Start: 2022-12-28 | End: 2022-12-28

## 2022-12-28 RX ORDER — METHOCARBAMOL 500 MG/1
500 TABLET, FILM COATED ORAL 2 TIMES DAILY
Qty: 20 TABLET | Refills: 0 | Status: SHIPPED | OUTPATIENT
Start: 2022-12-28

## 2022-12-28 RX ORDER — FENTANYL CITRATE 50 UG/ML
50 INJECTION, SOLUTION INTRAMUSCULAR; INTRAVENOUS ONCE
Status: COMPLETED | OUTPATIENT
Start: 2022-12-28 | End: 2022-12-28

## 2022-12-28 RX ORDER — LIDOCAINE 50 MG/G
1 PATCH TOPICAL DAILY
Qty: 6 PATCH | Refills: 0 | Status: SHIPPED | OUTPATIENT
Start: 2022-12-28

## 2022-12-28 RX ORDER — OXYCODONE HYDROCHLORIDE 5 MG/1
5 TABLET ORAL EVERY 6 HOURS PRN
Qty: 12 TABLET | Refills: 0 | Status: SHIPPED | OUTPATIENT
Start: 2022-12-28 | End: 2022-12-30 | Stop reason: SDUPTHER

## 2022-12-28 RX ORDER — NAPROXEN 500 MG/1
500 TABLET ORAL 2 TIMES DAILY WITH MEALS
Qty: 30 TABLET | Refills: 0 | Status: SHIPPED | OUTPATIENT
Start: 2022-12-28 | End: 2023-01-11

## 2022-12-28 RX ADMIN — IOHEXOL 100 ML: 350 INJECTION, SOLUTION INTRAVENOUS at 14:33

## 2022-12-28 RX ADMIN — FENTANYL CITRATE 75 MCG: 50 INJECTION, SOLUTION INTRAMUSCULAR; INTRAVENOUS at 14:16

## 2022-12-28 RX ADMIN — KETOROLAC TROMETHAMINE 9 MG: 30 INJECTION, SOLUTION INTRAMUSCULAR; INTRAVENOUS at 15:21

## 2022-12-28 RX ADMIN — FENTANYL CITRATE 50 MCG: 50 INJECTION, SOLUTION INTRAMUSCULAR; INTRAVENOUS at 13:23

## 2022-12-28 RX ADMIN — LIDOCAINE 2 PATCH: 140 PATCH CUTANEOUS at 15:19
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
## 2022-12-28 NOTE — LETTER
[address] [place] [address]  [address] [place] [address]      January 9, 2023    MRN: [id number]     Phone: [phone]     Dear Mr Daniel Nugent recently had a(n)  performed on  at  [address] [place] [address] that was requested by Reggie Jenkins DO  The study was reviewed by a radiologist, which is a physician who specializes in medical imaging  The radiologist issued a report describing his or her findings  In that report there was a finding that the radiologist felt warranted further discussion with your health care provider and that discussion would be beneficial to you  The results were sent to Reggie Jenkins DO on    We recommend that you contact Reggie Jenkins DO at  or set up an appointment to discuss the results of the imaging test  If you have already heard from Reggie Jenkins DO regarding the results of your study, you can disregard this letter  This letter is not meant to alarm you, but intended to encourage you to follow-up on your results with the provider that sent you for the imaging study  In addition, we have enclosed answers to frequently asked questions by other patients who have also received a letter to review results with their health care provider (see page two)  Thank you for choosing [facility] [employer] for your medical imaging needs  FREQUENTLY ASKED QUESTIONS    1  Why am I receiving this letter? [facility] requires us to notify patients who have findings on imaging exams that may require more testing or follow-up with a health professional within the next 3 months  2  How serious is the finding on the imaging test?  This letter is sent to all patients who may need follow-up or more testing within the next 3 months    Receiving this letter does not necessarily mean you have a life-threatening imaging finding or disease  Recommendations in the radiologist’s imaging report are general in nature and it is up to your healthcare provider to say whether those recommendations make sense for your situation  You are strongly encouraged to talk to your health care provider about the results and ask whether additional steps need to be taken  3  Where can I get a copy of the final report for my recent radiology exam?  To get a full copy of the report you can access your records online at [email or web address] or please contact [facility] Medical Records Department at [phone] Monday through Friday between 8 am and 6 pm          4  What do I need to do now? Please contact your health care provider who requested the imaging study to discuss what further actions (if any) are needed  You may have already heard from (your ordering provider) in regard to this test in which case you can disregard this letter  NOTICE IN ACCORDANCE WITH THE [place] STATE “PATIENT TEST RESULT INFORMATION ACT OF 2018”    You are receiving this notice as a result of a determination by your diagnostic imaging service that further discussions of your test results are warranted and would be beneficial to you  The complete results of your test or tests have been or will be sent to the health care practitioner that ordered the test or tests  It is recommended that you contact your health care practitioner to discuss your results as soon as possible

## 2022-12-28 NOTE — Clinical Note
Lakeisha Lio was seen and treated in our emergency department on 12/28/2022  No work until cleared by Family Doctor/Orthopedics        Diagnosis:     Milagro Mcmahan    He may return on this date: If you have any questions or concerns, please don't hesitate to call        Juno Alexander, DO    ______________________________           _______________          _______________  Hospital Representative                              Date                                Time
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
## 2022-12-29 LAB
ATRIAL RATE: 99 BPM
P AXIS: 77 DEGREES
PR INTERVAL: 144 MS
QRS AXIS: 269 DEGREES
QRSD INTERVAL: 138 MS
QT INTERVAL: 370 MS
QTC INTERVAL: 474 MS
T WAVE AXIS: 51 DEGREES
VENTRICULAR RATE: 99 BPM

## 2022-12-30 ENCOUNTER — TELEPHONE (OUTPATIENT)
Dept: FAMILY MEDICINE CLINIC | Facility: CLINIC | Age: 52
End: 2022-12-30

## 2022-12-30 DIAGNOSIS — S52.022A OLECRANON FRACTURE, LEFT, CLOSED, INITIAL ENCOUNTER: ICD-10-CM

## 2022-12-30 RX ORDER — OXYCODONE HYDROCHLORIDE 5 MG/1
5 TABLET ORAL EVERY 6 HOURS PRN
Qty: 12 TABLET | Refills: 0 | Status: SHIPPED | OUTPATIENT
Start: 2022-12-30 | End: 2023-01-11

## 2022-12-30 NOTE — TELEPHONE ENCOUNTER
Patient is going to run out of pain medication (Oxycodone) before he has appt with Ortho next week  Asking if you can give him enough to last until his appt due to does not want to be admitted

## 2022-12-30 NOTE — TELEPHONE ENCOUNTER
Patient had 12 of them filled at [facility] yesterday  Alejo from [employer] said its a 3 day supply and sometimes the PDMP takes 24 hours to update

## 2022-12-30 NOTE — TELEPHONE ENCOUNTER
Where did he get the ER script filled - it was sent to [facility] but it does not show on the pdmp?

## 2023-01-04 ENCOUNTER — OFFICE VISIT (OUTPATIENT)
Dept: OBGYN CLINIC | Facility: CLINIC | Age: 53
End: 2023-01-04

## 2023-01-04 VITALS
WEIGHT: 175.4 LBS | SYSTOLIC BLOOD PRESSURE: 177 MMHG | BODY MASS INDEX: 23.76 KG/M2 | RESPIRATION RATE: 16 BRPM | HEIGHT: 72 IN | DIASTOLIC BLOOD PRESSURE: 102 MMHG | HEART RATE: 92 BPM

## 2023-01-04 DIAGNOSIS — S52.032A DISPLACED FRACTURE OF OLECRANON PROCESS WITH INTRAARTICULAR EXTENSION OF LEFT ULNA, INITIAL ENCOUNTER FOR CLOSED FRACTURE: Primary | ICD-10-CM

## 2023-01-04 RX ORDER — OXYCODONE HYDROCHLORIDE AND ACETAMINOPHEN 5; 325 MG/1; MG/1
1 TABLET ORAL EVERY 4 HOURS PRN
Qty: 20 TABLET | Refills: 0 | Status: SHIPPED | OUTPATIENT
Start: 2023-01-04 | End: 2023-01-11

## 2023-01-04 RX ORDER — NALOXONE HYDROCHLORIDE 4 MG/.1ML
SPRAY NASAL
Qty: 1 EACH | Refills: 1 | Status: SHIPPED | OUTPATIENT
Start: 2023-01-04
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
## 2023-01-04 NOTE — PROGRESS NOTES
Splint application    Date/Time: 1/4/2023 3:47 PM  Performed by: Shelley Serrato DO  Authorized by: Shelley Serrato DO   Universal Protocol:  Consent: Verbal consent obtained    Risks and benefits: risks, benefits and alternatives were discussed  Consent given by: patient      Procedure details:     Laterality:  Left    Location:  Elbow    Elbow:  L elbow    Strapping: yes  Cast type:  Long arm      Splint type:  Long arm    Supplies:  Fiberglass  Post-procedure details:     Pain:  Unchanged    Sensation:  Normal

## 2023-01-05 ENCOUNTER — OFFICE VISIT (OUTPATIENT)
Dept: OBGYN CLINIC | Facility: MEDICAL CENTER | Age: 53
End: 2023-01-05

## 2023-01-05 VITALS
DIASTOLIC BLOOD PRESSURE: 104 MMHG | BODY MASS INDEX: 23.7 KG/M2 | WEIGHT: 175 LBS | SYSTOLIC BLOOD PRESSURE: 160 MMHG | HEIGHT: 72 IN | HEART RATE: 80 BPM

## 2023-01-05 DIAGNOSIS — S52.032A DISPLACED FRACTURE OF OLECRANON PROCESS WITH INTRAARTICULAR EXTENSION OF LEFT ULNA, INITIAL ENCOUNTER FOR CLOSED FRACTURE: ICD-10-CM

## 2023-01-05 RX ORDER — CEFAZOLIN SODIUM 2 G/50ML
2000 SOLUTION INTRAVENOUS ONCE
Status: CANCELLED | OUTPATIENT
Start: 2023-01-11 | End: 2023-01-05
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
## 2023-01-06 ENCOUNTER — TELEPHONE (OUTPATIENT)
Dept: FAMILY MEDICINE CLINIC | Facility: CLINIC | Age: 53
End: 2023-01-06

## 2023-01-06 DIAGNOSIS — S52.032A DISPLACED FRACTURE OF OLECRANON PROCESS WITH INTRAARTICULAR EXTENSION OF LEFT ULNA, INITIAL ENCOUNTER FOR CLOSED FRACTURE: Primary | ICD-10-CM

## 2023-01-06 RX ORDER — OXYCODONE HYDROCHLORIDE 5 MG/1
5 TABLET ORAL EVERY 4 HOURS PRN
Qty: 25 TABLET | Refills: 0 | Status: SHIPPED | OUTPATIENT
Start: 2023-01-06 | End: 2023-01-11

## 2023-01-06 NOTE — PRE-PROCEDURE INSTRUCTIONS
Pre-Surgery Instructions:   Medication Instructions   • amLODIPine (NORVASC) 5 mg tablet Take day of surgery  • famotidine (PEPCID) 20 mg tablet Take day of surgery  • folic acid (FOLVITE) 1 mg tablet Last dose 1/6   • ibuprofen (MOTRIN) 600 mg tablet Stop taking 3 days prior to surgery  • melatonin 3 mg Last dose 1/5   • methocarbamol (ROBAXIN) 500 mg tablet Uses PRN- OK to take day of surgery   • Multiple Vitamins-Minerals (Multi Complete) CAPS Last dose 1/6   • naproxen (Naprosyn) 500 mg tablet Stop taking 3 days prior to surgery  • oxyCODONE-acetaminophen (Percocet) 5-325 mg per tablet Uses PRN- OK to take day of surgery   • QUEtiapine (SEROquel) 50 mg tablet Take day of surgery  • rosuvastatin (CRESTOR) 5 mg tablet Take day of surgery  • topiramate (TOPAMAX) 100 mg tablet Take day of surgery  • traZODone (DESYREL) 100 mg tablet Take night before surgery     Spoke with pt via phone  Advised hospital location will call with arrival time night before surgery  NPO after midnight the night before surgery, including chewing gum and hard candy  A small sip of water is allowed to take approved medications the morning of surgery  Non-vaccinated patients and visitors need to remain masked at all times while in the hospital     Instructed to leave contacts/jewelery/valuables at home  Ok to wear dentures, glasses and hearing aides into the hospital - they will be removed for surgery  No smoking or alcohol consumption 24 hours prior to surgery  Avoid all Aspirin products and OTC Vit/Supp 1 week prior to surgery and avoid NSAIDs 3 days prior to surgery per anesthesia instructions  Tylenol ok to take prn  Showering instructions reviewed for night before and morning of surgery using surgical soap or antibacterial soap  Patient verbalized understanding of all of the above, including medication instructions

## 2023-01-06 NOTE — TELEPHONE ENCOUNTER
Pt called, said he was told by his surgeons to call PCP for refill on oxycodone for upcoming sx  LVM for pt that they were just filled on 1/4 by Dr Shankar Aguilar, pt called back and said he'll be out of them by the weekend and has to wait until next week for the surgery  Asking if you will fill since his surgeon wants him to have

## 2023-01-09 ENCOUNTER — ANESTHESIA EVENT (OUTPATIENT)
Dept: PERIOP | Facility: HOSPITAL | Age: 53
End: 2023-01-09

## 2023-01-11 ENCOUNTER — HOSPITAL ENCOUNTER (OUTPATIENT)
Dept: RADIOLOGY | Facility: HOSPITAL | Age: 53
Setting detail: OUTPATIENT SURGERY
Discharge: HOME | End: 2023-01-11

## 2023-01-11 ENCOUNTER — HOSPITAL ENCOUNTER (OUTPATIENT)
Facility: HOSPITAL | Age: 53
Setting detail: OUTPATIENT SURGERY
Discharge: HOME | End: 2023-01-11
Attending: STUDENT IN AN ORGANIZED HEALTH CARE EDUCATION/TRAINING PROGRAM | Admitting: STUDENT IN AN ORGANIZED HEALTH CARE EDUCATION/TRAINING PROGRAM

## 2023-01-11 ENCOUNTER — ANESTHESIA (OUTPATIENT)
Dept: PERIOP | Facility: HOSPITAL | Age: 53
End: 2023-01-11

## 2023-01-11 VITALS
HEIGHT: 72 IN | BODY MASS INDEX: 24.01 KG/M2 | DIASTOLIC BLOOD PRESSURE: 70 MMHG | OXYGEN SATURATION: 92 % | RESPIRATION RATE: 18 BRPM | WEIGHT: 177.25 LBS | SYSTOLIC BLOOD PRESSURE: 135 MMHG | HEART RATE: 80 BPM | TEMPERATURE: 97.8 F

## 2023-01-11 DIAGNOSIS — F33.2 MDD (MAJOR DEPRESSIVE DISORDER), RECURRENT EPISODE, SEVERE (HCC): ICD-10-CM

## 2023-01-11 DIAGNOSIS — S52.032A DISPLACED FRACTURE OF OLECRANON PROCESS WITH INTRAARTICULAR EXTENSION OF LEFT ULNA, INITIAL ENCOUNTER FOR CLOSED FRACTURE: ICD-10-CM

## 2023-01-11 DIAGNOSIS — Z47.89 AFTERCARE FOLLOWING SURGERY OF THE MUSCULOSKELETAL SYSTEM: Primary | ICD-10-CM

## 2023-01-11 DIAGNOSIS — M79.622 LEFT UPPER ARM PAIN: ICD-10-CM

## 2023-01-11 PROBLEM — J93.9 PNEUMOTHORAX ON RIGHT: Status: RESOLVED | Noted: 2021-06-16 | Resolved: 2023-01-11

## 2023-01-11 LAB
ABO GROUP BLD: NORMAL
BLD GP AB SCN SERPL QL: NEGATIVE
RH BLD: POSITIVE
SPECIMEN EXPIRATION DATE: NORMAL

## 2023-01-11 DEVICE — 2.7MM VA LCKNG SCREW SLF-TPNG WITH T8 STARDRIVE RECESS 12MM: Type: IMPLANTABLE DEVICE | Site: ELBOW | Status: FUNCTIONAL

## 2023-01-11 DEVICE — 2.7MM/3.5MM VA-LCP OLECRANON PL 2H/LT/90MM
Type: IMPLANTABLE DEVICE | Site: ELBOW | Status: FUNCTIONAL
Brand: VA-LCP

## 2023-01-11 DEVICE — 3.5MM CORTEX SCREW SELF-TAPPING 20MM: Type: IMPLANTABLE DEVICE | Site: ELBOW | Status: FUNCTIONAL

## 2023-01-11 DEVICE — 2.7MM VA LCKNG SCREW SLF-TPNG WITH T8 STARDRIVE RECESS 30MM: Type: IMPLANTABLE DEVICE | Site: ELBOW | Status: FUNCTIONAL

## 2023-01-11 DEVICE — 3.5MM CORTEX SCREW SELF-TAPPING 26MM: Type: IMPLANTABLE DEVICE | Site: ELBOW | Status: FUNCTIONAL

## 2023-01-11 DEVICE — 2.7MM VA LCKNG SCREW SLF-TPNG WITH T8 STARDRIVE RECESS 24MM: Type: IMPLANTABLE DEVICE | Site: ELBOW | Status: FUNCTIONAL

## 2023-01-11 DEVICE — 2.7MM VA LCKNG SCREW SLF-TPNG WITH T8 STARDRIVE RECESS 20MM: Type: IMPLANTABLE DEVICE | Site: ELBOW | Status: FUNCTIONAL

## 2023-01-11 DEVICE — 2.7MM METAPHYSEAL SCR SLF-TPNG W/T8 STRDRV RECESS/60MM: Type: IMPLANTABLE DEVICE | Site: ELBOW | Status: FUNCTIONAL

## 2023-01-11 RX ORDER — MIDAZOLAM HYDROCHLORIDE 2 MG/2ML
INJECTION, SOLUTION INTRAMUSCULAR; INTRAVENOUS
Status: COMPLETED | OUTPATIENT
Start: 2023-01-11 | End: 2023-01-11

## 2023-01-11 RX ORDER — OXYCODONE HYDROCHLORIDE 5 MG/1
5 TABLET ORAL EVERY 6 HOURS PRN
Qty: 30 TABLET | Refills: 0 | Status: SHIPPED | OUTPATIENT
Start: 2023-01-11 | End: 2023-01-21

## 2023-01-11 RX ORDER — FENTANYL CITRATE 50 UG/ML
INJECTION, SOLUTION INTRAMUSCULAR; INTRAVENOUS
Status: COMPLETED | OUTPATIENT
Start: 2023-01-11 | End: 2023-01-11

## 2023-01-11 RX ORDER — DEXAMETHASONE SODIUM PHOSPHATE 10 MG/ML
INJECTION, SOLUTION INTRAMUSCULAR; INTRAVENOUS AS NEEDED
Status: DISCONTINUED | OUTPATIENT
Start: 2023-01-11 | End: 2023-01-11

## 2023-01-11 RX ORDER — ASPIRIN 81 MG/1
81 TABLET ORAL 2 TIMES DAILY
Qty: 28 TABLET | Refills: 0 | Status: SHIPPED | OUTPATIENT
Start: 2023-01-11 | End: 2023-01-25

## 2023-01-11 RX ORDER — PROPOFOL 10 MG/ML
INJECTION, EMULSION INTRAVENOUS AS NEEDED
Status: DISCONTINUED | OUTPATIENT
Start: 2023-01-11 | End: 2023-01-11

## 2023-01-11 RX ORDER — CEFAZOLIN SODIUM 2 G/50ML
2000 SOLUTION INTRAVENOUS ONCE
Status: COMPLETED | OUTPATIENT
Start: 2023-01-11 | End: 2023-01-11

## 2023-01-11 RX ORDER — ACETAMINOPHEN 500 MG
1000 TABLET ORAL EVERY 8 HOURS
Qty: 60 TABLET | Refills: 0 | Status: SHIPPED | OUTPATIENT
Start: 2023-01-11 | End: 2023-01-21

## 2023-01-11 RX ORDER — ONDANSETRON 2 MG/ML
INJECTION INTRAMUSCULAR; INTRAVENOUS AS NEEDED
Status: DISCONTINUED | OUTPATIENT
Start: 2023-01-11 | End: 2023-01-11

## 2023-01-11 RX ORDER — EPHEDRINE SULFATE 50 MG/ML
INJECTION INTRAVENOUS AS NEEDED
Status: DISCONTINUED | OUTPATIENT
Start: 2023-01-11 | End: 2023-01-11

## 2023-01-11 RX ORDER — MAGNESIUM HYDROXIDE 1200 MG/15ML
LIQUID ORAL AS NEEDED
Status: DISCONTINUED | OUTPATIENT
Start: 2023-01-11 | End: 2023-01-11 | Stop reason: HOSPADM

## 2023-01-11 RX ORDER — ONDANSETRON 2 MG/ML
4 INJECTION INTRAMUSCULAR; INTRAVENOUS ONCE AS NEEDED
Status: DISCONTINUED | OUTPATIENT
Start: 2023-01-11 | End: 2023-01-11 | Stop reason: HOSPADM

## 2023-01-11 RX ORDER — SODIUM CHLORIDE 9 MG/ML
125 INJECTION, SOLUTION INTRAVENOUS CONTINUOUS
Status: DISCONTINUED | OUTPATIENT
Start: 2023-01-11 | End: 2023-01-11 | Stop reason: HOSPADM

## 2023-01-11 RX ORDER — OMEPRAZOLE 40 MG/1
40 CAPSULE, DELAYED RELEASE ORAL DAILY
COMMUNITY

## 2023-01-11 RX ORDER — LIDOCAINE HYDROCHLORIDE 20 MG/ML
INJECTION, SOLUTION EPIDURAL; INFILTRATION; INTRACAUDAL; PERINEURAL AS NEEDED
Status: DISCONTINUED | OUTPATIENT
Start: 2023-01-11 | End: 2023-01-11

## 2023-01-11 RX ORDER — NAPROXEN 500 MG/1
500 TABLET ORAL 2 TIMES DAILY WITH MEALS
Qty: 20 TABLET | Refills: 0 | Status: SHIPPED | OUTPATIENT
Start: 2023-01-11 | End: 2023-01-21

## 2023-01-11 RX ORDER — ROPIVACAINE HYDROCHLORIDE 5 MG/ML
INJECTION, SOLUTION EPIDURAL; INFILTRATION; PERINEURAL
Status: COMPLETED | OUTPATIENT
Start: 2023-01-11 | End: 2023-01-11

## 2023-01-11 RX ORDER — FENTANYL CITRATE/PF 50 MCG/ML
25 SYRINGE (ML) INJECTION
Status: DISCONTINUED | OUTPATIENT
Start: 2023-01-11 | End: 2023-01-11 | Stop reason: HOSPADM

## 2023-01-11 RX ADMIN — PROPOFOL 100 MG: 10 INJECTION, EMULSION INTRAVENOUS at 10:58

## 2023-01-11 RX ADMIN — EPHEDRINE SULFATE 10 MG: 50 INJECTION, SOLUTION INTRAVENOUS at 09:44

## 2023-01-11 RX ADMIN — SODIUM CHLORIDE: 0.9 INJECTION, SOLUTION INTRAVENOUS at 09:52

## 2023-01-11 RX ADMIN — EPHEDRINE SULFATE 10 MG: 50 INJECTION, SOLUTION INTRAVENOUS at 09:53

## 2023-01-11 RX ADMIN — EPHEDRINE SULFATE 5 MG: 50 INJECTION, SOLUTION INTRAVENOUS at 10:38

## 2023-01-11 RX ADMIN — FENTANYL CITRATE 100 MCG: 50 INJECTION INTRAMUSCULAR; INTRAVENOUS at 08:42

## 2023-01-11 RX ADMIN — EPHEDRINE SULFATE 5 MG: 50 INJECTION, SOLUTION INTRAVENOUS at 10:11

## 2023-01-11 RX ADMIN — LIDOCAINE HYDROCHLORIDE 80 MG: 20 INJECTION, SOLUTION EPIDURAL; INFILTRATION; INTRACAUDAL; PERINEURAL at 09:19

## 2023-01-11 RX ADMIN — PROPOFOL 200 MG: 10 INJECTION, EMULSION INTRAVENOUS at 09:19

## 2023-01-11 RX ADMIN — SODIUM CHLORIDE 125 ML/HR: 0.9 INJECTION, SOLUTION INTRAVENOUS at 09:00

## 2023-01-11 RX ADMIN — ONDANSETRON 4 MG: 2 INJECTION INTRAMUSCULAR; INTRAVENOUS at 10:56

## 2023-01-11 RX ADMIN — MIDAZOLAM 4 MG: 1 INJECTION INTRAMUSCULAR; INTRAVENOUS at 08:42

## 2023-01-11 RX ADMIN — SODIUM CHLORIDE 125 ML/HR: 0.9 INJECTION, SOLUTION INTRAVENOUS at 07:48

## 2023-01-11 RX ADMIN — CEFAZOLIN SODIUM 2000 MG: 2 SOLUTION INTRAVENOUS at 09:06

## 2023-01-11 RX ADMIN — DEXAMETHASONE SODIUM PHOSPHATE 8 MG: 10 INJECTION INTRAMUSCULAR; INTRAVENOUS at 09:36

## 2023-01-11 RX ADMIN — EPHEDRINE SULFATE 15 MG: 50 INJECTION, SOLUTION INTRAVENOUS at 09:34

## 2023-01-11 RX ADMIN — ROPIVACAINE HYDROCHLORIDE 30 ML: 5 INJECTION EPIDURAL; INFILTRATION; PERINEURAL at 08:42

## 2023-01-11 RX ADMIN — SODIUM CHLORIDE: 0.9 INJECTION, SOLUTION INTRAVENOUS at 10:58
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
## 2023-01-11 NOTE — ANESTHESIA POSTPROCEDURE EVALUATION
Post-Op Assessment Note    CV Status:  Stable  Pain Score: 1    Pain management: adequate     Mental Status:  Alert and awake   Hydration Status:  Euvolemic   PONV Controlled:  Controlled   Airway Patency:  Patent   Two or more mitigation strategies used for obstructive sleep apnea   Post Op Vitals Reviewed: Yes      Staff: Anesthesiologist         No notable events documented      BP      Temp      Pulse     Resp      SpO2      /73   Pulse 72   Temp 97 5 °F (36 4 °C)   Resp 13   Ht 6' (1 829 m)   Wt 80 4 kg (177 lb 4 oz)   SpO2 98%   BMI 24 04 kg/m²

## 2023-01-11 NOTE — ANESTHESIA PREPROCEDURE EVALUATION
Procedure:  (ORIF) ELBOW - OLECRANON (Left: Elbow)    Relevant Problems   ANESTHESIA (within normal limits)      CARDIO   (+) Hyperlipidemia   (+) Hypertension   (+) Rib pain   (+) Thoracic back pain      GI/HEPATIC   (+) Gastroesophageal reflux disease      MUSCULOSKELETAL   (+) Thoracic back pain      NEURO/PSYCH   (+) History of colon polyps   (+) Major depressive disorder, recurrent episode, moderate (HCC)      PULMONARY   (+) Centrilobular emphysema (HCC)      Musculoskeletal and Integument   (+) Rib fracture        Physical Exam    Airway    Mallampati score: II  TM Distance: >3 FB  Neck ROM: full     Dental   No notable dental hx     Cardiovascular  Rhythm: regular, Rate: normal, Cardiovascular exam normal    Pulmonary  Pulmonary exam normal Breath sounds clear to auscultation,     Other Findings        Anesthesia Plan  ASA Score- 3     Anesthesia Type- general with ASA Monitors  Additional Monitors:   Airway Plan:           Plan Factors-    Chart reviewed  Existing labs reviewed  Patient summary reviewed  Patient is not a current smoker  Patient not instructed to abstain from smoking on day of procedure  Patient did not smoke on day of surgery  There is medical exclusion for perioperative obstructive sleep apnea risk education  Induction- intravenous  Postoperative Plan-     Informed Consent- Anesthetic plan and risks discussed with patient

## 2023-01-11 NOTE — ANESTHESIA PROCEDURE NOTES
Peripheral Block    Patient location during procedure: holding area  Start time: 1/11/2023 8:19 AM  Reason for block: at surgeon's request and post-op pain management  Staffing  Performed: Anesthesiologist   Anesthesiologist: Antoinette Conn DO  Preanesthetic Checklist  Completed: patient identified, IV checked, site marked, risks and benefits discussed, surgical consent, monitors and equipment checked, pre-op evaluation and timeout performed  Peripheral Block  Patient position: supine  Prep: ChloraPrep  Patient monitoring: heart rate, cardiac monitor, continuous pulse ox and frequent blood pressure checks  Block type: supraclavicular  Laterality: left  Injection technique: single-shot  Procedures: ultrasound guided, Ultrasound guidance required for the procedure to increase accuracy and safety of medication placement and decrease risk of complications    Ultrasound permanent image savedropivacaine (NAROPIN) 0 5 % - Perineural   30 mL - 1/11/2023 8:42:00 AM  midazolam (VERSED) 2 mg/2 mL - Intravenous   4 mg - 1/11/2023 8:42:00 AM  fentaNYL 50 mcg/mL - Intravenous   100 mcg - 1/11/2023 8:42:00 AM  Needle  Needle type: Stimuplex   Needle gauge: 22 G  Needle length: 5 cm  Needle localization: ultrasound guidance  Test dose: negative  Assessment  Injection assessment: incremental injection  Paresthesia pain: none  Heart rate change: no  Slow fractionated injection: yes

## 2023-01-11 NOTE — INTERVAL H&P NOTE
H&P reviewed  After examining the patient I find no changes in the patients condition since the H&P had been written      Vitals:    01/11/23 0838   BP: 124/67   Pulse: 67   Resp:    Temp:    SpO2: 97%

## 2023-01-17 ENCOUNTER — OFFICE VISIT (OUTPATIENT)
Dept: OBGYN CLINIC | Facility: CLINIC | Age: 53
End: 2023-01-17

## 2023-01-17 ENCOUNTER — TELEPHONE (OUTPATIENT)
Dept: PSYCHIATRY | Facility: CLINIC | Age: 53
End: 2023-01-17

## 2023-01-17 VITALS
DIASTOLIC BLOOD PRESSURE: 92 MMHG | HEART RATE: 99 BPM | WEIGHT: 173 LBS | SYSTOLIC BLOOD PRESSURE: 152 MMHG | HEIGHT: 72 IN | BODY MASS INDEX: 23.43 KG/M2

## 2023-01-17 DIAGNOSIS — S52.022D CLOSED FRACTURE OF OLECRANON PROCESS OF LEFT ULNA WITH ROUTINE HEALING, SUBSEQUENT ENCOUNTER: Primary | ICD-10-CM

## 2023-01-17 NOTE — PROGRESS NOTES
ORTHOPEDIC POST OPERATIVE NOTE    Patient: Nasrin New III   Age: 45 y o  Sex: male  Gender: male  Date of Visit: 01/17/23    DOS: 1/11/23  Procedure performed: ORIF left olecrenon    Chief Complaint   Patient presents with   • Left Elbow - Post-op       Subjective:  45 y o  male POD # 6 s/p ORIF left elbow     Pain/Complaints: pain was controlled, but mostly with oxy  Has been taking naproxen and tylenol scheduled around the clock   Numbness/weakness extremity: none    Physical Therapy Progress: not yet started   DVT ppx: continue ASA 81 BID   Abx: N/A   Eating/Drinking improving  Bowel/Bladder: WNL   Denies fever/chills, numbness/tingling, injury/trauma, night sweats    Physical Exam:  Height: 6' (182 9 cm)  Weight - Scale: 78 5 kg (173 lb)  BMI (Calculated): 23 5  BSA (Calculated - m2): 2 sq meters         Vitals:    01/17/23 1310   BP: 152/92   Pulse: 99     Body mass index is 23 46 kg/m²  General: alert and oriented; well nourished/well developed; no apparent distress  Extremity left arm: mild swelling throughout  Dressing/Surgical site: Dressing removed, suture line intact, no drainage or erythema of incision site, staples intact   Motor/Senstation:  strength 5/5 Dorsi/plantar flexion 5/5; SILT distally  Brisk cap refill  Calf: bilateral calves soft, nontender    Radiology: reviewed intraoperative xrays with patient  Impression/Plan: 45 y o  male  POD# 6 s/p Left elbow ORIF (olecanon)  1  S/p ORIF elbow- left olecranon  - Pain - continue current regimen  Encouraged to use icing, elevating, and maintaining the splint to   -Wound Care- No soaking/bathing for another 1-2 weeks  Keep splint dry and intact  - continue Ice packs/elevation   Compression with KE/Spanx   - continue sling if it provides comfort  - WBAT with assistance as needed  - outpatient PT script to be given to patient at future follow up     FOLLOW UP: at 2 weeks post-op      Cassius Kate PA-C   1/17/2023 4:48 PM

## 2023-01-19 ENCOUNTER — TELEPHONE (OUTPATIENT)
Dept: OBGYN CLINIC | Facility: HOSPITAL | Age: 53
End: 2023-01-19

## 2023-01-19 NOTE — TELEPHONE ENCOUNTER
Caller: Patient    Doctor: Dr Shanice Madrigal    Reason for call: Patient calling in stating that he was told by Dr Shanice Madrigal to come in on 1/24/23 to have his staples in his left elbow removed  He noted Dr Shanice Madrigal was going to be out but his PA-C or another doctor would be able to take them out in the [facility] location  Currently I see no schedule for anyone to place patient for appointment    Patient asking to speak to clinical team     Call back#: [phone]

## 2023-01-20 NOTE — TELEPHONE ENCOUNTER
Caller:Self    Doctor: Trevon Robbins    Reason for call: Patient has been scheduled for 1/24    Call back#: [phone]

## 2023-01-24 ENCOUNTER — OFFICE VISIT (OUTPATIENT)
Dept: OBGYN CLINIC | Facility: CLINIC | Age: 53
End: 2023-01-24

## 2023-01-24 VITALS
DIASTOLIC BLOOD PRESSURE: 88 MMHG | BODY MASS INDEX: 23.7 KG/M2 | HEART RATE: 74 BPM | HEIGHT: 72 IN | WEIGHT: 175 LBS | SYSTOLIC BLOOD PRESSURE: 145 MMHG

## 2023-01-24 DIAGNOSIS — Z47.89 AFTERCARE FOLLOWING SURGERY OF THE MUSCULOSKELETAL SYSTEM: ICD-10-CM

## 2023-01-24 DIAGNOSIS — S52.022D CLOSED FRACTURE OF OLECRANON PROCESS OF LEFT ULNA WITH ROUTINE HEALING, SUBSEQUENT ENCOUNTER: Primary | ICD-10-CM

## 2023-02-07 ENCOUNTER — PATIENT MESSAGE (OUTPATIENT)
Dept: CARDIAC SURGERY | Facility: CLINIC | Age: 53
End: 2023-02-07

## 2023-02-13 ENCOUNTER — TELEPHONE (OUTPATIENT)
Dept: OBGYN CLINIC | Facility: HOSPITAL | Age: 53
End: 2023-02-13

## 2023-02-13 ENCOUNTER — DOCUMENTATION (OUTPATIENT)
Dept: OTHER | Facility: HOSPITAL | Age: 53
End: 2023-02-13

## 2023-02-13 DIAGNOSIS — Z87.81 S/P ORIF (OPEN REDUCTION INTERNAL FIXATION) FRACTURE: ICD-10-CM

## 2023-02-13 DIAGNOSIS — Z47.89 AFTERCARE FOLLOWING SURGERY OF THE MUSCULOSKELETAL SYSTEM: Primary | ICD-10-CM

## 2023-02-13 DIAGNOSIS — Z98.890 S/P ORIF (OPEN REDUCTION INTERNAL FIXATION) FRACTURE: ICD-10-CM

## 2023-02-13 RX ORDER — CEPHALEXIN 250 MG/1
CAPSULE ORAL 4 TIMES DAILY
Refills: 0 | Status: CANCELLED
Start: 2023-02-13

## 2023-02-13 RX ORDER — CEPHALEXIN 500 MG/1
500 CAPSULE ORAL EVERY 6 HOURS SCHEDULED
Qty: 28 CAPSULE | Refills: 0 | Status: SHIPPED | OUTPATIENT
Start: 2023-02-13 | End: 2023-04-28 | Stop reason: ALTCHOICE

## 2023-02-14 NOTE — TELEPHONE ENCOUNTER
I lvm to schedule from ortho care request    Where Does it Hurt? Neck  Are you considering joint replacement? No   Are you seeking a second opinion? No  If yes, who is your doctor?

## 2023-02-20 DIAGNOSIS — I10 ESSENTIAL HYPERTENSION: ICD-10-CM

## 2023-02-20 DIAGNOSIS — G43.001 MIGRAINE WITHOUT AURA AND WITH STATUS MIGRAINOSUS, NOT INTRACTABLE: Primary | ICD-10-CM

## 2023-02-20 RX ORDER — TOPIRAMATE 50 MG/1
TABLET, FILM COATED ORAL
Qty: 60 TABLET | Refills: 0 | Status: SHIPPED | OUTPATIENT
Start: 2023-02-20

## 2023-02-20 RX ORDER — AMLODIPINE BESYLATE 5 MG/1
5 TABLET ORAL DAILY
Qty: 30 TABLET | Refills: 0 | Status: SHIPPED | OUTPATIENT
Start: 2023-02-20

## 2023-02-21 ENCOUNTER — APPOINTMENT (OUTPATIENT)
Dept: RADIOLOGY | Facility: MEDICAL CENTER | Age: 53
End: 2023-02-21

## 2023-02-21 ENCOUNTER — OFFICE VISIT (OUTPATIENT)
Dept: OBGYN CLINIC | Facility: CLINIC | Age: 53
End: 2023-02-21

## 2023-02-21 VITALS
HEART RATE: 82 BPM | HEIGHT: 72 IN | WEIGHT: 181 LBS | SYSTOLIC BLOOD PRESSURE: 166 MMHG | BODY MASS INDEX: 24.52 KG/M2 | DIASTOLIC BLOOD PRESSURE: 99 MMHG

## 2023-02-21 DIAGNOSIS — S52.022D CLOSED FRACTURE OF OLECRANON PROCESS OF LEFT ULNA WITH ROUTINE HEALING, SUBSEQUENT ENCOUNTER: ICD-10-CM

## 2023-02-21 DIAGNOSIS — S52.022D CLOSED FRACTURE OF OLECRANON PROCESS OF LEFT ULNA WITH ROUTINE HEALING, SUBSEQUENT ENCOUNTER: Primary | ICD-10-CM

## 2023-02-21 NOTE — PROGRESS NOTES
ORTHOPEDIC POST OPERATIVE NOTE    Patient: Irma Williamson III   Age: 45 y o  Sex: male  Gender: male  Date of Visit: 02/21/23    DOS: 1/11/23  Procedure performed: ORIF left olecrenon    Chief Complaint   Patient presents with   • Left Elbow - Post-op       Subjective:  45 y o  male POD # 6 weeks s/p ORIF left elbow     Pain/Complaints: No complaints of pain   Numbness/weakness extremity: none, range of motion limited    Physical Therapy Progress: Has begun some therapy but unable to continuously participate due to ride issues  DVT ppx: continue ASA 81 BID   Abx: N/A   Eating/Drinking improving  Bowel/Bladder: WNL   Denies fever/chills, numbness/tingling, injury/trauma, night sweats    Physical Exam:  Height: 6' (182 9 cm)  Weight - Scale: 78 5 kg (173 lb)  BMI (Calculated): 23 5  BSA (Calculated - m2): 2 sq meters         Vitals:    01/17/23 1310   BP: 152/92   Pulse: 99     Body mass index is 23 46 kg/m²  General: alert and oriented; well nourished/well developed; no apparent distress  Extremity left arm: mild swelling throughout  Dressing/Surgical site: Mild tenderness and mild erythema over olecranon bursa  Motor/Senstation:  strength 5/5 Dorsi/plantar flexion 5/5; SILT distally  Brisk cap refill  Calf: bilateral calves soft, nontender    Radiology: X-ray 2/21/2023: Well-maintained position and reduction of fracture with intact hardware with no evidence of loosening  Impression/Plan: 45 y o  male  POD 6 weeks s/p Left elbow ORIF (olecanon)  1  S/p ORIF elbow- left olecranon  - Pain - continue current regimen    -Wound Care-management as tolerated  - continue Ice packs/elevation  Compression with KE/Spanx   -Continue sling and brace  - WBAT with assistance as needed  -Stressed importance of physical therapy and Occupational Therapy to left elbow    If patient does not have aggressive therapy will lose significant range of motion due to the surgery  -Patient had a prescription for Keflex that was instructed to finish  -Follow-up if any increased erythema or warmth returns to elbow     FOLLOW UP: at 12 weeks post-op (6 weeks from now)

## 2023-03-16 ENCOUNTER — EVALUATION (OUTPATIENT)
Dept: PHYSICAL THERAPY | Facility: HOME HEALTHCARE | Age: 53
End: 2023-03-16

## 2023-03-16 DIAGNOSIS — S52.022D CLOSED FRACTURE OF OLECRANON PROCESS OF LEFT ULNA WITH ROUTINE HEALING, SUBSEQUENT ENCOUNTER: Primary | ICD-10-CM

## 2023-03-16 NOTE — PROGRESS NOTES
PT Evaluation     Today's date: 3/16/2023  Patient name: Fransisco Winslow III  : 1970  MRN: [id number]  Referring provider: Malachi Mckeon*  Dx:   Encounter Diagnosis     ICD-10-CM    1  Closed fracture of olecranon process of left ulna with routine healing, subsequent encounter  [id number]           Start Time: 1640  Stop Time: 0585  Total time in clinic (min): 35 minutes    Assessment  Assessment details: Pt is a 44 y/o male presenting to OPPT s/p L olecranon fx on 2022 with an ORIF which was performed on 2023  Pt is presenting with significant pain levels, elbow and wrist ROM deficits, strength deficits, and decreased functional mobility, which is affecting his ability to perform ADLs  Pt requires skilled PT in order to improve in pain, strength, ROM, functional mobility, quality of life, and return to PLOF  Thank you! Impairments: abnormal or restricted ROM, abnormal movement, activity intolerance, impaired physical strength, lacks appropriate home exercise program, pain with function, weight-bearing intolerance and poor body mechanics  Understanding of Dx/Px/POC: good   Prognosis: good    Goals  STG: 3-4 weeks  Pt will be independent with HEP in order to continue to progress outside of PT treatment sessions  Pt will improve in quality of life as demonstrated by worst pain levels of 5/10 or less  Pt will improve in functional mobility as demonstrated by L elbow flex/ext ROM of  deg or greater  LT-8 weeks  Pt will improve in quality of life as demonstrated by worst pain levels of 2/10 or less  Pt will improve in functional mobility as demonstrated by strength levels of 4+/5 or greater  Pt will improve in functional mobility as demonstrated by L elbow ROM WFL  Pt will improve in functional mobility as demonstrated by ability to perform full ADLs without any limitations due to pain, strength, or ROM      Plan  Patient would benefit from: skilled physical therapy  Planned modality interventions: cryotherapy  Planned therapy interventions: body mechanics training, flexibility, functional ROM exercises, home exercise program, manual therapy, massage, neuromuscular re-education, patient education, postural training, strengthening, stretching, therapeutic activities and therapeutic exercise  Frequency: 1x week  Duration in weeks: 4  Plan of Care beginning date: 3/16/2023  Plan of Care expiration date: 2023  Treatment plan discussed with: patient        Subjective Evaluation    History of Present Illness  Mechanism of injury: Pt is presenting to OPPT s/p L olecranon fracture which occurred on 2022 with an ORIF which was performed on 2023  Pt was previously NWB for his LUE, but is now WBAT  Pt reports that his elbow has been healing up well, but that now he has a lot of tightness in his elbow  Pt reports that he is unable to fully bend and straighten his elbow due to pain and tightness  Pt does report having some pain if he puts pressure on his elbow or stretch it  Pain  Current pain ratin  At best pain ratin  At worst pain rating: 10  Quality: sharp  Relieving factors: rest and support  Aggravating factors: lifting (fully straightening or bending)      Diagnostic Tests  X-ray: normal  Patient Goals  Patient goals for therapy: decreased pain, increased motion, increased strength, independence with ADLs/IADLs and return to sport/leisure activities  Patient goal: To be to shoot his bow, get back to normal stuff        Objective     Palpation   Left   No palpable tenderness to the biceps  Hypertonic in the triceps, wrist extensors and wrist flexors  Muscle spasm in the triceps, wrist extensors and wrist flexors  Tenderness of the biceps, triceps, wrist extensors and wrist flexors       Neurological Testing     Sensation     Elbow   Left Elbow   Intact: light touch    Right Elbow   Intact: light touch    Active Range of Motion   Left Shoulder   Flexion: WFL  Abduction: WFL  External rotation BTH: C7   Internal rotation BTB: WFL    Right Shoulder   Flexion: WFL  External rotation BTH: WFL  Internal rotation BTB: WFL    Passive Range of Motion     Left Elbow   Flexion: 110 degrees with pain  Extension: 34 degrees     Right Elbow   Flexion: WFL  Extension: WFL    Left Wrist   Wrist flexion: 66 degrees   Wrist extension: 70 degrees   Radial deviation: 30 degrees   Ulnar deviation: 30 degrees     Right Wrist   Wrist flexion: 80 degrees   Wrist extension: 80 degrees   Radial deviation: 30 degrees   Ulnar deviation: 30 degrees     Strength/Myotome Testing     Left Shoulder     Planes of Motion   Flexion: 4+   Abduction: 4+     Isolated Muscles   Upper trapezius: 5     Right Shoulder     Planes of Motion   Flexion: 5   Abduction: 5     Isolated Muscles   Upper trapezius: 5     Left Elbow   Flexion: 4  Extension: 3    Right Elbow   Flexion: 5  Extension: 5    Left Wrist/Hand   Wrist extension: 4+  Wrist flexion: 3+  Radial deviation: 4+  Ulnar deviation: 4-     (2nd hand position)     Trial 1: 112 5    Trial 2: 100    Trial 3: 120    Right Wrist/Hand   Wrist extension: 5  Wrist flexion: 5  Radial deviation: 5  Ulnar deviation: 5     (2nd hand position)     Trial 1: 140    Trial 2: 130    Trial 3: 132 5             Precautions: None    Re-eval Date: 04/16/2023      Manuals 3/16            L elbow, wrist PROM 8'            IASTM L med/lat condyles, triceps NV                                      Neuro Re-Ed             MTP/LTP             Biceps curls             Triceps extension                          Ther Ex             UBE             Elbow flex stretch 3x20"            Elbow ext stretch 3x20"            Wrist flex stretch 3x20"            Wrist ext stretch 3x20"            Wrist sup/pron AROM             Wrist flex AROM             Wrist ext AROM             Wrist RD/UD AROM             Digiflex             Ther Activity Gait Training                                       Modalities

## 2023-03-23 ENCOUNTER — APPOINTMENT (OUTPATIENT)
Dept: PHYSICAL THERAPY | Facility: HOME HEALTHCARE | Age: 53
End: 2023-03-23

## 2023-03-23 ENCOUNTER — TELEPHONE (OUTPATIENT)
Dept: FAMILY MEDICINE CLINIC | Facility: CLINIC | Age: 53
End: 2023-03-23

## 2023-03-23 DIAGNOSIS — F51.01 PRIMARY INSOMNIA: ICD-10-CM

## 2023-03-23 DIAGNOSIS — I10 ESSENTIAL HYPERTENSION: ICD-10-CM

## 2023-03-23 DIAGNOSIS — F33.2 MDD (MAJOR DEPRESSIVE DISORDER), RECURRENT EPISODE, SEVERE (HCC): ICD-10-CM

## 2023-03-23 DIAGNOSIS — G43.001 MIGRAINE WITHOUT AURA AND WITH STATUS MIGRAINOSUS, NOT INTRACTABLE: ICD-10-CM

## 2023-03-23 RX ORDER — TOPIRAMATE 50 MG/1
50 TABLET, FILM COATED ORAL 2 TIMES DAILY
Qty: 60 TABLET | Refills: 0 | Status: SHIPPED | OUTPATIENT
Start: 2023-03-23

## 2023-03-23 RX ORDER — TRAZODONE HYDROCHLORIDE 100 MG/1
100 TABLET ORAL
Qty: 30 TABLET | Refills: 0 | Status: SHIPPED | OUTPATIENT
Start: 2023-03-23 | End: 2023-04-22

## 2023-03-23 RX ORDER — QUETIAPINE FUMARATE 50 MG/1
50 TABLET, FILM COATED ORAL 2 TIMES DAILY
Qty: 60 TABLET | Refills: 0 | Status: SHIPPED | OUTPATIENT
Start: 2023-03-23 | End: 2023-04-22

## 2023-03-23 RX ORDER — AMLODIPINE BESYLATE 5 MG/1
5 TABLET ORAL DAILY
Qty: 30 TABLET | Refills: 0 | Status: SHIPPED | OUTPATIENT
Start: 2023-03-23

## 2023-03-23 NOTE — TELEPHONE ENCOUNTER
----- Message from Socrates Moise DO sent at 3/23/2023 12:07 PM EDT -----  Regarding: awv  He missed last appt  I filled meds today but needs to schedule AWV - in the next month

## 2023-04-03 ENCOUNTER — HOSPITAL ENCOUNTER (OUTPATIENT)
Dept: CT IMAGING | Facility: HOSPITAL | Age: 53
Discharge: HOME | End: 2023-04-03

## 2023-04-03 DIAGNOSIS — C34.11 PRIMARY ADENOCARCINOMA OF UPPER LOBE OF RIGHT LUNG (HCC): ICD-10-CM

## 2023-04-29 DIAGNOSIS — B37.0 THRUSH: Primary | ICD-10-CM

## 2023-04-29 RX ORDER — CLOTRIMAZOLE 10 MG/1
10 LOZENGE ORAL; TOPICAL 3 TIMES DAILY
Qty: 30 TROCHE | Refills: 0 | Status: SHIPPED | OUTPATIENT
Start: 2023-04-29 | End: 2023-05-09

## 2023-07-06 ENCOUNTER — TELEPHONE (OUTPATIENT)
Dept: FAMILY MEDICINE CLINIC | Facility: CLINIC | Age: 53
End: 2023-07-06

## 2023-07-07 ENCOUNTER — TELEPHONE (OUTPATIENT)
Dept: FAMILY MEDICINE CLINIC | Facility: CLINIC | Age: 53
End: 2023-07-07

## 2023-07-07 DIAGNOSIS — F32.1 CURRENT MODERATE EPISODE OF MAJOR DEPRESSIVE DISORDER, UNSPECIFIED WHETHER RECURRENT (HCC): Primary | ICD-10-CM

## 2023-07-07 NOTE — TELEPHONE ENCOUNTER
When he was in the hospital two months ago, they put me on  SERTALINE, 50 mg and he has been taking it for 2 months and needs a refill

## 2024-01-04 ENCOUNTER — OFFICE VISIT (OUTPATIENT)
Dept: FAMILY MEDICINE CLINIC | Facility: CLINIC | Age: 54
End: 2024-01-04
Payer: COMMERCIAL

## 2024-01-04 VITALS
DIASTOLIC BLOOD PRESSURE: 70 MMHG | WEIGHT: 167.2 LBS | BODY MASS INDEX: 22.65 KG/M2 | TEMPERATURE: 97.9 F | SYSTOLIC BLOOD PRESSURE: 126 MMHG | HEART RATE: 62 BPM | RESPIRATION RATE: 18 BRPM | OXYGEN SATURATION: 98 % | HEIGHT: 72 IN

## 2024-01-04 DIAGNOSIS — J43.2 CENTRILOBULAR EMPHYSEMA (HCC): ICD-10-CM

## 2024-01-04 DIAGNOSIS — F10.11 HISTORY OF ETOH ABUSE: ICD-10-CM

## 2024-01-04 DIAGNOSIS — I27.20 MILD PULMONARY HYPERTENSION (HCC): ICD-10-CM

## 2024-01-04 DIAGNOSIS — E55.9 VITAMIN D DEFICIENCY: ICD-10-CM

## 2024-01-04 DIAGNOSIS — I25.118 CORONARY ARTERY DISEASE OF NATIVE HEART WITH STABLE ANGINA PECTORIS, UNSPECIFIED VESSEL OR LESION TYPE (HCC): ICD-10-CM

## 2024-01-04 DIAGNOSIS — J40 BRONCHITIS: Primary | ICD-10-CM

## 2024-01-04 DIAGNOSIS — F33.41 RECURRENT MAJOR DEPRESSIVE DISORDER, IN PARTIAL REMISSION (HCC): ICD-10-CM

## 2024-01-04 DIAGNOSIS — E29.1 HYPOGONADISM IN MALE: ICD-10-CM

## 2024-01-04 DIAGNOSIS — C34.11 PRIMARY ADENOCARCINOMA OF UPPER LOBE OF RIGHT LUNG (HCC): ICD-10-CM

## 2024-01-04 DIAGNOSIS — E78.5 HYPERLIPIDEMIA, UNSPECIFIED HYPERLIPIDEMIA TYPE: ICD-10-CM

## 2024-01-04 PROBLEM — R07.81 RIB PAIN: Status: RESOLVED | Noted: 2022-12-28 | Resolved: 2024-01-04

## 2024-01-04 PROBLEM — M79.622 LEFT UPPER ARM PAIN: Status: RESOLVED | Noted: 2022-12-28 | Resolved: 2024-01-04

## 2024-01-04 PROBLEM — R94.31 ABNORMAL EKG: Status: RESOLVED | Noted: 2022-07-12 | Resolved: 2024-01-04

## 2024-01-04 PROBLEM — F10.929 ALCOHOLIC INTOXICATION (HCC): Status: RESOLVED | Noted: 2023-05-10 | Resolved: 2024-01-04

## 2024-01-04 PROBLEM — F10.929 ALCOHOLIC INTOXICATION (HCC): Status: ACTIVE | Noted: 2023-05-10

## 2024-01-04 PROCEDURE — 99214 OFFICE O/P EST MOD 30 MIN: CPT | Performed by: INTERNAL MEDICINE

## 2024-01-04 RX ORDER — AMOXICILLIN AND CLAVULANATE POTASSIUM 875; 125 MG/1; MG/1
1 TABLET, FILM COATED ORAL 2 TIMES DAILY
Qty: 14 TABLET | Refills: 0 | Status: SHIPPED | OUTPATIENT
Start: 2024-01-04 | End: 2024-01-11

## 2024-01-04 RX ORDER — SERTRALINE HYDROCHLORIDE 100 MG/1
100 TABLET, FILM COATED ORAL DAILY
Qty: 90 TABLET | Refills: 3 | Status: SHIPPED | OUTPATIENT
Start: 2024-01-04

## 2024-01-04 RX ORDER — PREDNISONE 20 MG/1
20 TABLET ORAL DAILY
Qty: 5 TABLET | Refills: 0 | Status: SHIPPED | OUTPATIENT
Start: 2024-01-04

## 2024-01-04 RX ORDER — BENZONATATE 100 MG/1
100 CAPSULE ORAL 3 TIMES DAILY PRN
Qty: 20 CAPSULE | Refills: 0 | Status: SHIPPED | OUTPATIENT
Start: 2024-01-04 | End: 2024-01-11

## 2024-01-04 NOTE — PROGRESS NOTES
Name: Prieto Dunlap III      : 1970      MRN: [id number]  Encounter Provider: Kiera Edwards DO  Encounter Date: 2024   Encounter department: [facility]    Assessment & Plan     1. Hyperlipidemia, unspecified hyperlipidemia type  -     Lipid panel; Future  -     Comprehensive metabolic panel; Future  Rx for fbw Lo fat diet and resume exercise     2. Hypogonadism in male  -     Testosterone; Future  -     CBC (Includes Diff/Plt) (Refl); Future    3. Vitamin D deficiency  -     Vitamin D 25 hydroxy; Future    4. Coronary artery disease of native heart with stable angina pectoris, unspecified vessel or lesion type (HCC)  Recheck labs Stop smoking Recommend schedule Cardiology followup    5. Primary adenocarcinoma of upper lobe of right lung (HCC)  Pt has CT and Ct surgery followup in April     6. Centrilobular emphysema (HCC)  Stop smoking     7. Mild pulmonary hypertension (HCC)  Setup Cardiology followup    8. Bronchitis  -     sertraline (ZOLOFT) 100 mg tablet; Take 1 tablet (100 mg total) by mouth daily  -     predniSONE 20 mg tablet; Take 1 tablet (20 mg total) by mouth daily  -     amoxicillin-clavulanate (AUGMENTIN) 875-125 mg per tablet; Take 1 tablet by mouth 2 (two) times a day for 7 days  -     benzonatate (TESSALON PERLES) 100 mg capsule; Take 1 capsule (100 mg total) by mouth 3 (three) times a day as needed for cough for up to 7 days        Depression Screening and Follow-up Plan: Patient was screened for depression during today's encounter. They screened negative with a PHQ-9 score of 1.    Tobacco Cessation Counseling: Tobacco cessation counseling was provided. The patient is sincerely urged to quit consumption of tobacco. He is not ready to quit tobacco.   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 Negative for chills and fever.   HENT:  Positive for congestion and postnasal drip.    Eyes:  Negative for visual disturbance.   Respiratory:  Positive for cough. Negative for shortness of breath.    Cardiovascular:  Negative for chest pain, palpitations and leg swelling.   Gastrointestinal:  Negative for abdominal distention and abdominal pain.   Musculoskeletal:  Positive for arthralgias.   Neurological:  Negative for dizziness, light-headedness and headaches.   Psychiatric/Behavioral:  Negative for sleep disturbance. The patient is not nervous/anxious.        Current Outpatient Medications on File Prior to Visit   Medication Sig   • amLODIPine (NORVASC) 5 mg tablet Take 1 tablet (5 mg total) by mouth daily   • Multiple Vitamins-Minerals (Multi Complete) CAPS Take 1 capsule by mouth daily   • omeprazole (PriLOSEC) 40 MG capsule Take 40 mg by mouth daily Unsure of strength   • topiramate (TOPAMAX) 50 MG tablet Take 1 tablet (50 mg total) by mouth 2 (two) times a day   • traZODone (DESYREL) 100 mg tablet Take 1 tablet (100 mg total) by mouth daily at bedtime   • [DISCONTINUED] famotidine (PEPCID) 20 mg tablet Take 1 tablet (20 mg total) by mouth 2 (two) times a day   • [DISCONTINUED] sertraline (ZOLOFT) 50 mg tablet Take 1 tablet (50 mg total) by mouth in the morning (Patient taking differently: Take 100 mg by mouth in the morning)   • [DISCONTINUED] topiramate (TOPAMAX) 100 mg tablet Take 1 tablet (100 mg total) by mouth 2 (two) times a day (Patient taking differently: Take 100 mg by mouth daily)   • cholecalciferol (VITAMIN D3) 1,000 units tablet Take  1 tablet (1,000 Units total) by mouth daily Do not start before January 28, 2023. (Patient not taking: Reported on 12/28/2022)   • naloxone (NARCAN) 4 mg/0.1 mL nasal spray Administer 1 spray into a nostril. If no response after 2-3 minutes, give another dose in the other nostril using a new spray. (Patient not taking: Reported on 1/5/2023)   • [DISCONTINUED] aspirin (ECOTRIN LOW STRENGTH) 81 mg EC tablet Take 1 tablet (81 mg total) by mouth 2 (two) times a day for 14 days (Patient not taking: Reported on 1/4/2024)   • [DISCONTINUED] folic acid (FOLVITE) 1 mg tablet Take 1 tablet (1 mg total) by mouth daily (Patient not taking: Reported on 1/4/2024)   • [DISCONTINUED] ibuprofen (MOTRIN) 600 mg tablet Take 1 tablet (600 mg total) by mouth every 6 (six) hours as needed for mild pain (Patient not taking: Reported on 1/4/2024)   • [DISCONTINUED] lidocaine (Lidoderm) 5 % Apply 1 patch topically daily Remove & Discard patch within 12 hours or as directed by MD (Patient not taking: Reported on 1/5/2023)   • [DISCONTINUED] methocarbamol (ROBAXIN) 500 mg tablet Take 1 tablet (500 mg total) by mouth 2 (two) times a day (Patient not taking: Reported on 1/4/2024)   • [DISCONTINUED] naltrexone (REVIA) 50 mg tablet Take 1 tablet (50 mg total) by mouth daily (Patient not taking: Reported on 1/4/2024)   • [DISCONTINUED] naproxen (Naprosyn) 500 mg tablet Take 1 tablet (500 mg total) by mouth 2 (two) times a day with meals for 10 days (Patient not taking: Reported on 1/4/2024)   • [DISCONTINUED] QUEtiapine (SEROquel) 50 mg tablet Take 1 tablet (50 mg total) by mouth 2 (two) times a day (Patient not taking: Reported on 1/4/2024)   • [DISCONTINUED] rosuvastatin (CRESTOR) 5 mg tablet Take 1 tablet (5 mg total) by mouth daily (Patient not taking: Reported on 1/4/2024)   • [DISCONTINUED] thiamine (VITAMIN B1) 100 mg tablet Take 1 tablet (100 mg total) by mouth daily (Patient not taking: Reported on 1/5/2023)       Objective     Pulse 62    Temp 97.9 °F (36.6 °C) (Temporal)   Ht 6' (1.829 m)   Wt 75.8 kg (167 lb 3.2 oz)   SpO2 98%   BMI 22.68 kg/m²     Physical Exam  Vitals and nursing note reviewed.   Constitutional:       General: He is not in acute distress.     Appearance: Normal appearance. He is not ill-appearing, toxic-appearing or diaphoretic.   HENT:      Head: Normocephalic and atraumatic.      Right Ear: External ear normal.      Left Ear: External ear normal.      Nose: Nose normal.      Mouth/Throat:      Mouth: Mucous membranes are moist.   Eyes:      General: No scleral icterus.  Cardiovascular:      Rate and Rhythm: Normal rate and regular rhythm.      Pulses: Normal pulses.   Pulmonary:      Effort: Pulmonary effort is normal. No respiratory distress.      Breath sounds: No stridor. Wheezing present. No rhonchi.      Comments: Decrease bs faint wheeze  Abdominal:      General: Bowel sounds are normal. There is no distension.      Palpations: Abdomen is soft.      Tenderness: There is no abdominal tenderness.   Musculoskeletal:      Cervical back: Normal range of motion and neck supple.      Right lower leg: No edema.      Left lower leg: No edema.   Lymphadenopathy:      Cervical: No cervical adenopathy.   Skin:     General: Skin is warm and dry.      Coloration: Skin is not jaundiced or pale.   Neurological:      General: No focal deficit present.      Mental Status: He is alert and oriented to person, place, and time. Mental status is at baseline.      Cranial Nerves: No cranial nerve deficit.      Sensory: No sensory deficit.   Psychiatric:         Mood and Affect: Mood normal.         Behavior: Behavior normal.         Thought Content: Thought content normal.         Judgment: Judgment normal.     Kiera Edwards DO

## 2024-01-12 DIAGNOSIS — K21.9 GASTROESOPHAGEAL REFLUX DISEASE, UNSPECIFIED WHETHER ESOPHAGITIS PRESENT: Primary | ICD-10-CM

## 2024-01-12 RX ORDER — OMEPRAZOLE 40 MG/1
CAPSULE, DELAYED RELEASE ORAL
Qty: 90 CAPSULE | Refills: 3 | Status: SHIPPED | OUTPATIENT
Start: 2024-01-12

## 2024-02-15 ENCOUNTER — RA CDI HCC (OUTPATIENT)
Dept: OTHER | Facility: HOSPITAL | Age: 54
End: 2024-02-15

## 2024-02-21 ENCOUNTER — HOSPITAL ENCOUNTER (OUTPATIENT)
Dept: NON INVASIVE DIAGNOSTICS | Facility: HOSPITAL | Age: 54
Discharge: HOME | End: 2024-02-21
Payer: COMMERCIAL

## 2024-02-21 VITALS
BODY MASS INDEX: 22.62 KG/M2 | HEART RATE: 80 BPM | SYSTOLIC BLOOD PRESSURE: 126 MMHG | HEIGHT: 72 IN | WEIGHT: 167 LBS | DIASTOLIC BLOOD PRESSURE: 70 MMHG

## 2024-02-21 DIAGNOSIS — R06.02 SOB (SHORTNESS OF BREATH): ICD-10-CM

## 2024-02-21 DIAGNOSIS — I25.118 CORONARY ARTERY DISEASE OF NATIVE HEART WITH STABLE ANGINA PECTORIS, UNSPECIFIED VESSEL OR LESION TYPE (HCC): ICD-10-CM

## 2024-02-21 DIAGNOSIS — R07.89 OTHER CHEST PAIN: ICD-10-CM

## 2024-02-21 PROBLEM — Z00.00 MEDICARE ANNUAL WELLNESS VISIT, SUBSEQUENT: Status: RESOLVED | Noted: 2019-09-30 | Resolved: 2024-02-21

## 2024-02-21 LAB
AORTIC ROOT: 2.9 CM
APICAL FOUR CHAMBER EJECTION FRACTION: 69 %
BSA FOR ECHO PROCEDURE: 1.97 M2
E WAVE DECELERATION TIME: 265 MS
E/A RATIO: 0.93
FRACTIONAL SHORTENING: 32 (ref 28–44)
INTERVENTRICULAR SEPTUM IN DIASTOLE (PARASTERNAL SHORT AXIS VIEW): 1.5 CM
INTERVENTRICULAR SEPTUM: 1.5 CM (ref 0.6–1.1)
LAAS-AP2: 15.6 CM2
LAAS-AP4: 11.2 CM2
LEFT ATRIUM SIZE: 3.3 CM
LEFT ATRIUM VOLUME (MOD BIPLANE): 35 ML
LEFT ATRIUM VOLUME INDEX (MOD BIPLANE): 17.8 ML/M2
LEFT INTERNAL DIMENSION IN SYSTOLE: 2.5 CM (ref 2.1–4)
LEFT VENTRICULAR INTERNAL DIMENSION IN DIASTOLE: 3.7 CM (ref 3.5–6)
LEFT VENTRICULAR POSTERIOR WALL IN END DIASTOLE: 1.4 CM
LEFT VENTRICULAR STROKE VOLUME: 35 ML
LVSV (TEICH): 35 ML
MV E'TISSUE VEL-SEP: 11 CM/S
MV PEAK A VEL: 0.69 M/S
MV PEAK E VEL: 64 CM/S
MV STENOSIS PRESSURE HALF TIME: 77 MS
MV VALVE AREA P 1/2 METHOD: 2.86
RA PRESSURE ESTIMATED: 5 MMHG
RIGHT ATRIUM AREA SYSTOLE A4C: 12 CM2
RIGHT VENTRICLE ID DIMENSION: 2.9 CM
RV PSP: 29 MMHG
SL CV LEFT ATRIUM LENGTH A2C: 4.5 CM
SL CV LV EF: 60
SL CV PED ECHO LEFT VENTRICLE DIASTOLIC VOLUME (MOD BIPLANE) 2D: 57 ML
SL CV PED ECHO LEFT VENTRICLE SYSTOLIC VOLUME (MOD BIPLANE) 2D: 22 ML
TR MAX PG: 24 MMHG
TR PEAK VELOCITY: 2.5 M/S
TRICUSPID ANNULAR PLANE SYSTOLIC EXCURSION: 2.1 CM
TRICUSPID VALVE PEAK REGURGITATION VELOCITY: 2.45 M/S

## 2024-02-21 PROCEDURE — 93306 TTE W/DOPPLER COMPLETE: CPT

## 2024-02-21 PROCEDURE — 93306 TTE W/DOPPLER COMPLETE: CPT | Performed by: INTERNAL MEDICINE

## 2024-02-24 ENCOUNTER — RA CDI HCC (OUTPATIENT)
Dept: OTHER | Facility: HOSPITAL | Age: 54
End: 2024-02-24

## 2024-02-25 NOTE — PROGRESS NOTES
HCC coding opportunities       Chart reviewed, no opportunity found: CHART REVIEWED, NO OPPORTUNITY FOUND     GR       Patients Insurance     Medicare Insurance: [employer]age

## 2024-03-15 ENCOUNTER — APPOINTMENT (OUTPATIENT)
Dept: LAB | Facility: HOSPITAL | Age: 54
End: 2024-03-15
Payer: COMMERCIAL

## 2024-03-15 DIAGNOSIS — E55.9 VITAMIN D DEFICIENCY: ICD-10-CM

## 2024-03-15 DIAGNOSIS — E29.1 HYPOGONADISM IN MALE: ICD-10-CM

## 2024-03-15 DIAGNOSIS — E78.5 HYPERLIPIDEMIA, UNSPECIFIED HYPERLIPIDEMIA TYPE: ICD-10-CM

## 2024-03-15 LAB
25(OH)D3 SERPL-MCNC: 28.6 NG/ML (ref 30–100)
ALBUMIN SERPL BCP-MCNC: 4.5 G/DL (ref 3.5–5)
ALP SERPL-CCNC: 79 U/L (ref 34–104)
ALT SERPL W P-5'-P-CCNC: 20 U/L (ref 7–52)
ANION GAP SERPL CALCULATED.3IONS-SCNC: 8 MMOL/L (ref 4–13)
AST SERPL W P-5'-P-CCNC: 22 U/L (ref 13–39)
BASOPHILS # BLD AUTO: 0.11 THOUSANDS/ÂΜL (ref 0–0.1)
BASOPHILS NFR BLD AUTO: 1 % (ref 0–1)
BILIRUB SERPL-MCNC: 0.8 MG/DL (ref 0.2–1)
BUN SERPL-MCNC: 15 MG/DL (ref 5–25)
CALCIUM SERPL-MCNC: 9.8 MG/DL (ref 8.4–10.2)
CHLORIDE SERPL-SCNC: 105 MMOL/L (ref 96–108)
CHOLEST SERPL-MCNC: 241 MG/DL
CO2 SERPL-SCNC: 27 MMOL/L (ref 21–32)
CREAT SERPL-MCNC: 0.89 MG/DL (ref 0.6–1.3)
EOSINOPHIL # BLD AUTO: 0.14 THOUSAND/ÂΜL (ref 0–0.61)
EOSINOPHIL NFR BLD AUTO: 2 % (ref 0–6)
ERYTHROCYTE [DISTWIDTH] IN BLOOD BY AUTOMATED COUNT: 13.2 % (ref 11.6–15.1)
GFR SERPL CREATININE-BSD FRML MDRD: 96 ML/MIN/1.73SQ M
GLUCOSE P FAST SERPL-MCNC: 103 MG/DL (ref 65–99)
HCT VFR BLD AUTO: 45.2 % (ref 36.5–49.3)
HDLC SERPL-MCNC: 50 MG/DL
HGB BLD-MCNC: 14.8 G/DL (ref 12–17)
IMM GRANULOCYTES # BLD AUTO: 0.02 THOUSAND/UL (ref 0–0.2)
IMM GRANULOCYTES NFR BLD AUTO: 0 % (ref 0–2)
LDLC SERPL CALC-MCNC: 168 MG/DL (ref 0–100)
LYMPHOCYTES # BLD AUTO: 1.64 THOUSANDS/ÂΜL (ref 0.6–4.47)
LYMPHOCYTES NFR BLD AUTO: 21 % (ref 14–44)
MCH RBC QN AUTO: 29.2 PG (ref 26.8–34.3)
MCHC RBC AUTO-ENTMCNC: 32.7 G/DL (ref 31.4–37.4)
MCV RBC AUTO: 89 FL (ref 82–98)
MONOCYTES # BLD AUTO: 0.58 THOUSAND/ÂΜL (ref 0.17–1.22)
MONOCYTES NFR BLD AUTO: 8 % (ref 4–12)
NEUTROPHILS # BLD AUTO: 5.27 THOUSANDS/ÂΜL (ref 1.85–7.62)
NEUTS SEG NFR BLD AUTO: 68 % (ref 43–75)
NONHDLC SERPL-MCNC: 191 MG/DL
NRBC BLD AUTO-RTO: 0 /100 WBCS
PLATELET # BLD AUTO: 362 THOUSANDS/UL (ref 149–390)
PMV BLD AUTO: 8.6 FL (ref 8.9–12.7)
POTASSIUM SERPL-SCNC: 3.9 MMOL/L (ref 3.5–5.3)
PROT SERPL-MCNC: 7.1 G/DL (ref 6.4–8.4)
RBC # BLD AUTO: 5.06 MILLION/UL (ref 3.88–5.62)
SODIUM SERPL-SCNC: 140 MMOL/L (ref 135–147)
TRIGL SERPL-MCNC: 113 MG/DL
WBC # BLD AUTO: 7.76 THOUSAND/UL (ref 4.31–10.16)

## 2024-03-15 PROCEDURE — 80053 COMPREHEN METABOLIC PANEL: CPT

## 2024-03-15 PROCEDURE — 36415 COLL VENOUS BLD VENIPUNCTURE: CPT

## 2024-03-15 PROCEDURE — 82306 VITAMIN D 25 HYDROXY: CPT

## 2024-03-15 PROCEDURE — 85025 COMPLETE CBC W/AUTO DIFF WBC: CPT

## 2024-03-15 PROCEDURE — 80061 LIPID PANEL: CPT

## 2024-03-16 ENCOUNTER — OFFICE VISIT (OUTPATIENT)
Dept: FAMILY MEDICINE CLINIC | Facility: CLINIC | Age: 54
End: 2024-03-16
Payer: COMMERCIAL

## 2024-03-16 VITALS
OXYGEN SATURATION: 98 % | BODY MASS INDEX: 23.38 KG/M2 | TEMPERATURE: 98.2 F | WEIGHT: 172.6 LBS | HEIGHT: 72 IN | HEART RATE: 71 BPM

## 2024-03-16 DIAGNOSIS — Z00.00 MEDICARE ANNUAL WELLNESS VISIT, SUBSEQUENT: ICD-10-CM

## 2024-03-16 DIAGNOSIS — E78.5 HYPERLIPIDEMIA, UNSPECIFIED HYPERLIPIDEMIA TYPE: ICD-10-CM

## 2024-03-16 DIAGNOSIS — F33.1 MAJOR DEPRESSIVE DISORDER, RECURRENT EPISODE, MODERATE (HCC): ICD-10-CM

## 2024-03-16 DIAGNOSIS — R06.09 DYSPNEA ON EXERTION: Primary | ICD-10-CM

## 2024-03-16 DIAGNOSIS — M79.672 LEFT FOOT PAIN: ICD-10-CM

## 2024-03-16 DIAGNOSIS — Z12.5 PROSTATE CANCER SCREENING: ICD-10-CM

## 2024-03-16 PROCEDURE — G0439 PPPS, SUBSEQ VISIT: HCPCS | Performed by: INTERNAL MEDICINE

## 2024-03-16 RX ORDER — ATORVASTATIN CALCIUM 20 MG/1
20 TABLET, FILM COATED ORAL DAILY
Qty: 30 TABLET | Refills: 5 | Status: SHIPPED | OUTPATIENT
Start: 2024-03-16

## 2024-03-16 RX ORDER — CLOTRIMAZOLE 1 %
CREAM (GRAM) TOPICAL 2 TIMES DAILY
Qty: 28 G | Refills: 0 | Status: SHIPPED | OUTPATIENT
Start: 2024-03-16

## 2024-03-16 NOTE — PATIENT INSTRUCTIONS
Medicare Preventive Visit Patient Instructions  Thank you for completing your Welcome to Medicare Visit or Medicare Annual Wellness Visit today. Your next wellness visit will be due in one year (3/17/2025).  The screening/preventive services that you may require over the next 5-10 years are detailed below. Some tests may not apply to you based off risk factors and/or age. Screening tests ordered at today's visit but not completed yet may show as past due. Also, please note that scanned in results may not display below.  Preventive Screenings:  Service Recommendations Previous Testing/Comments   Colorectal Cancer Screening  Colonoscopy    Fecal Occult Blood Test (FOBT)/Fecal Immunochemical Test (FIT)  Fecal DNA/Cologuard Test  Flexible Sigmoidoscopy Age: 45-75 years old   Colonoscopy: every 10 years (May be performed more frequently if at higher risk)  OR  FOBT/FIT: every 1 year  OR  Cologuard: every 3 years  OR  Sigmoidoscopy: every 5 years  Screening may be recommended earlier than age 45 if at higher risk for colorectal cancer. Also, an individualized decision between you and your healthcare provider will decide whether screening between the ages of 76-85 would be appropriate. Colonoscopy: 04/24/2019  FOBT/FIT: Not on file  Cologuard: Not on file  Sigmoidoscopy: Not on file    Screening Current     Prostate Cancer Screening Individualized decision between patient and health care provider in men between ages of 55-69   Medicare will cover every 12 months beginning on the day after your 50th birthday PSA: 0.4 ng/mL           Hepatitis C Screening Once for adults born between 1945 and 1965  More frequently in patients at high risk for Hepatitis C Hep C Antibody: 03/25/2016    Screening Current   Diabetes Screening 1-2 times per year if you're at risk for diabetes or have pre-diabetes Fasting glucose: 103 mg/dL (3/15/2024)  A1C: 5.8 % (5/13/2023)  Screening Current   Cholesterol Screening Once every 5 years if you  don't have a lipid disorder. May order more often based on risk factors. Lipid panel: 03/15/2024  Screening Not Indicated  History Lipid Disorder      Other Preventive Screenings Covered by Medicare:  Abdominal Aortic Aneurysm (AAA) Screening: covered once if your at risk. You're considered to be at risk if you have a family history of AAA or a male between the age of 65-75 who smoking at least 100 cigarettes in your lifetime.  Lung Cancer Screening: covers low dose CT scan once per year if you meet all of the following conditions: (1) Age 55-77; (2) No signs or symptoms of lung cancer; (3) Current smoker or have quit smoking within the last 15 years; (4) You have a tobacco smoking history of at least 20 pack years (packs per day x number of years you smoked); (5) You get a written order from a healthcare provider.  Glaucoma Screening: covered annually if you're considered high risk: (1) You have diabetes OR (2) Family history of glaucoma OR (3)  aged 50 and older OR (4)  American aged 65 and older  Osteoporosis Screening: covered every 2 years if you meet one of the following conditions: (1) Have a vertebral abnormality; (2) On glucocorticoid therapy for more than 3 months; (3) Have primary hyperparathyroidism; (4) On osteoporosis medications and need to assess response to drug therapy.  HIV Screening: covered annually if you're between the age of 15-65. Also covered annually if you are younger than 15 and older than 65 with risk factors for HIV infection. For pregnant patients, it is covered up to 3 times per pregnancy.    Immunizations:  Immunization Recommendations   Influenza Vaccine Annual influenza vaccination during flu season is recommended for all persons aged >= 6 months who do not have contraindications   Pneumococcal Vaccine   * Pneumococcal conjugate vaccine = PCV13 (Prevnar 13), PCV15 (Vaxneuvance), PCV20 (Prevnar 20)  * Pneumococcal polysaccharide vaccine = PPSV23 (Pneumovax)  Adults 19-63 yo with certain risk factors or if 65+ yo  If never received any pneumonia vaccine: recommend Prevnar 20 (PCV20)  Give PCV20 if previously received 1 dose of PCV13 or PPSV23   Hepatitis B Vaccine 3 dose series if at intermediate or high risk (ex: diabetes, end stage renal disease, liver disease)   Respiratory syncytial virus (RSV) Vaccine - COVERED BY MEDICARE PART D  * RSVPreF3 (Arexvy) CDC recommends that adults 60 years of age and older may receive a single dose of RSV vaccine using shared clinical decision-making (SCDM)   Tetanus (Td) Vaccine - COST NOT COVERED BY MEDICARE PART B Following completion of primary series, a booster dose should be given every 10 years to maintain immunity against tetanus. Td may also be given as tetanus wound prophylaxis.   Tdap Vaccine - COST NOT COVERED BY MEDICARE PART B Recommended at least once for all adults. For pregnant patients, recommended with each pregnancy.   Shingles Vaccine (Shingrix) - COST NOT COVERED BY MEDICARE PART B  2 shot series recommended in those 19 years and older who have or will have weakened immune systems or those 50 years and older     Health Maintenance Due:      Topic Date Due   • HIV Screening  Never done   • Colorectal Cancer Screening  04/24/2024   • Hepatitis C Screening  Completed   • Lung Cancer Screening  Discontinued     Immunizations Due:      Topic Date Due   • Pneumococcal Vaccine: Pediatrics (0 to 5 Years) and At-Risk Patients (6 to 64 Years) (1 of 2 - PCV) Never done   • Hepatitis A Vaccine (1 of 2 - Risk 2-dose series) Never done   • Influenza Vaccine (1) 09/01/2023   • COVID-19 Vaccine (3 - 2023-24 season) 09/01/2023     Advance Directives   What are advance directives?  Advance directives are legal documents that state your wishes and plans for medical care. These plans are made ahead of time in case you lose your ability to make decisions for yourself. Advance directives can apply to any medical decision, such as the  treatments you want, and if you want to donate organs.   What are the types of advance directives?  There are many types of advance directives, and each state has rules about how to use them. You may choose a combination of any of the following:  Living will:  This is a written record of the treatment you want. You can also choose which treatments you do not want, which to limit, and which to stop at a certain time. This includes surgery, medicine, IV fluid, and tube feedings.   Durable power of  for healthcare (DPAHC):  This is a written record that states who you want to make healthcare choices for you when you are unable to make them for yourself. This person, called a proxy, is usually a family member or a friend. You may choose more than 1 proxy.  Do not resuscitate (DNR) order:  A DNR order is used in case your heart stops beating or you stop breathing. It is a request not to have certain forms of treatment, such as CPR. A DNR order may be included in other types of advance directives.  Medical directive:  This covers the care that you want if you are in a coma, near death, or unable to make decisions for yourself. You can list the treatments you want for each condition. Treatment may include pain medicine, surgery, blood transfusions, dialysis, IV or tube feedings, and a ventilator (breathing machine).  Values history:  This document has questions about your views, beliefs, and how you feel and think about life. This information can help others choose the care that you would choose.  Why are advance directives important?  An advance directive helps you control your care. Although spoken wishes may be used, it is better to have your wishes written down. Spoken wishes can be misunderstood, or not followed. Treatments may be given even if you do not want them. An advance directive may make it easier for your family to make difficult choices about your care.   Cigarette Smoking and Your Health   Risks to  your health if you smoke:  Nicotine and other chemicals found in tobacco damage every cell in your body. Even if you are a light smoker, you have an increased risk for cancer, heart disease, and lung disease. If you are pregnant or have diabetes, smoking increases your risk for complications.   Benefits to your health if you stop smoking:   You decrease respiratory symptoms such as coughing, wheezing, and shortness of breath.   You reduce your risk for cancers of the lung, mouth, throat, kidney, bladder, pancreas, stomach, and cervix. If you already have cancer, you increase the benefits of chemotherapy. You also reduce your risk for cancer returning or a second cancer from developing.   You reduce your risk for heart disease, blood clots, heart attack, and stroke.   You reduce your risk for lung infections, and diseases such as pneumonia, asthma, chronic bronchitis, and emphysema.  Your circulation improves. More oxygen can be delivered to your body. If you have diabetes, you lower your risk for complications, such as kidney, artery, and eye diseases. You also lower your risk for nerve damage. Nerve damage can lead to amputations, poor vision, and blindness.  You improve your body's ability to heal and to fight infections.  For more information and support to stop smoking:   Smokefree.gov  Phone: 1- 119 - 125-4401  Web Address: www.[employer].gov     © Copyright [employer] 2018 Information is for End User's use only and may not be sold, redistributed or otherwise used for commercial purposes. All illustrations and images included in CareNotes® are the copyrighted property of [employer] or [employer]
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
## 2024-03-28 ENCOUNTER — TELEPHONE (OUTPATIENT)
Dept: HEMATOLOGY ONCOLOGY | Facility: CLINIC | Age: 54
End: 2024-03-28

## 2024-03-28 NOTE — TELEPHONE ENCOUNTER
I am reaching out regarding your appointment with Luisana Fowler  on 4/15/24.    There has been a change to the providers schedule, and we will need to reschedule your appointment.    I left a voicemail explaining to patient that this appointment will need to be rescheduled due to a change in the providers schedule. Appointment has been cancelled. Patient was advised to call [facility] [phone] to reschedule.

## 2024-04-08 ENCOUNTER — HOSPITAL ENCOUNTER (OUTPATIENT)
Dept: CT IMAGING | Facility: HOSPITAL | Age: 54
Discharge: HOME | End: 2024-04-08
Payer: COMMERCIAL

## 2024-04-08 DIAGNOSIS — C34.11 PRIMARY ADENOCARCINOMA OF UPPER LOBE OF RIGHT LUNG (HCC): ICD-10-CM

## 2024-04-08 PROCEDURE — G1004 CDSM NDSC: HCPCS

## 2024-04-08 PROCEDURE — 71250 CT THORAX DX C-: CPT

## 2024-04-09 ENCOUNTER — TELEPHONE (OUTPATIENT)
Dept: HEMATOLOGY ONCOLOGY | Facility: CLINIC | Age: 54
End: 2024-04-09

## 2024-04-09 NOTE — TELEPHONE ENCOUNTER
Appointment Change  Cancel, Reschedule, Change to Virtual      Who are you speaking with? Patient   If it is not the patient, is the caller listed on the communication consent form? Yes   Which provider is the appointment scheduled with? Dr. Felix and Luisana Mcnally PA-C   When was the original appointment scheduled?    Please list date and time 4/15/24   At which location is the appointment scheduled to take place? [place]   Was the appointment rescheduled?     Was the appointment changed from an in person visit to a virtual visit?    If so, please list the details of the change. 4/24/24   What is the reason for the appointment change? provided

## 2024-04-11 ENCOUNTER — APPOINTMENT (OUTPATIENT)
Dept: RADIOLOGY | Facility: MEDICAL CENTER | Age: 54
End: 2024-04-11
Payer: COMMERCIAL

## 2024-04-11 ENCOUNTER — OFFICE VISIT (OUTPATIENT)
Dept: PODIATRY | Facility: CLINIC | Age: 54
End: 2024-04-11
Payer: COMMERCIAL

## 2024-04-11 VITALS
HEART RATE: 66 BPM | SYSTOLIC BLOOD PRESSURE: 120 MMHG | WEIGHT: 175 LBS | BODY MASS INDEX: 23.7 KG/M2 | DIASTOLIC BLOOD PRESSURE: 68 MMHG | HEIGHT: 72 IN

## 2024-04-11 DIAGNOSIS — B35.3 TINEA PEDIS OF LEFT FOOT: ICD-10-CM

## 2024-04-11 DIAGNOSIS — M79.672 LEFT FOOT PAIN: ICD-10-CM

## 2024-04-11 DIAGNOSIS — M79.672 LEFT FOOT PAIN: Primary | ICD-10-CM

## 2024-04-11 DIAGNOSIS — F51.01 PRIMARY INSOMNIA: ICD-10-CM

## 2024-04-11 DIAGNOSIS — I10 ESSENTIAL HYPERTENSION: ICD-10-CM

## 2024-04-11 PROCEDURE — 99204 OFFICE O/P NEW MOD 45 MIN: CPT | Performed by: STUDENT IN AN ORGANIZED HEALTH CARE EDUCATION/TRAINING PROGRAM

## 2024-04-11 PROCEDURE — 73630 X-RAY EXAM OF FOOT: CPT

## 2024-04-11 RX ORDER — TRAZODONE HYDROCHLORIDE 100 MG/1
TABLET ORAL
Qty: 30 TABLET | Refills: 5 | Status: SHIPPED | OUTPATIENT
Start: 2024-04-11

## 2024-04-11 RX ORDER — TERBINAFINE HYDROCHLORIDE 250 MG/1
250 TABLET ORAL DAILY
Qty: 14 TABLET | Refills: 0 | Status: SHIPPED | OUTPATIENT
Start: 2024-04-11 | End: 2024-04-25

## 2024-04-11 RX ORDER — AMLODIPINE BESYLATE 5 MG/1
TABLET ORAL
Qty: 30 TABLET | Refills: 5 | Status: SHIPPED | OUTPATIENT
Start: 2024-04-11

## 2024-04-11 NOTE — PROGRESS NOTES
"Assessment/Plan:     Diagnoses and all orders for this visit:    Left foot pain  -     Ambulatory Referral to Podiatry    Tinea pedis of left foot  -     terbinafine (LamISIL) 250 mg tablet; Take 1 tablet (250 mg total) by mouth daily for 14 days          Imaging Reviewed at this visit (I personally reviewed/independently interpreted images and reports in PACS)  XR left foot WB 3v 4/11/24: No acute osseous abnormalities noted.  Elongated and elevated 1st metatarsal. Adductovarus 5th toe.      IMPRESSION:  Left 4th interdigital space maceration, tinea pedis.   Left foot pain w/ likely h/o infection     PLAN:  I reviewed clinical exam and radiographic imaging (XR) with patient in detail today. I have discussed with the patient the pathophysiology of this diagnosis and reviewed how the examination correlates with this diagnosis.  PCP note from 3/16/24 reviewed   Patient notes h/o skin discoloration, pain, swelling which has largely resolved by today. There is significant maceration and what appears to be tinea pedis btwn toe 4/5 which could have led to imposed bacterial infection however I do not note redness, swelling or fluctuance on exam today. We will treat him for tinea pedis and get MRI in 2 wks if symptoms still present  14d terbinafine rx'd today  Daily betadine btwn toes  F/u 2 wks for recheck; MRI if no improvement    Subjective:      Patient ID: Prieto Dunlap III is a 53 y.o. male.    Prieto presents to clinic today concerning left foot pain. Notes about one month ago, his foot became swollen, tender, discolored and noticed a small \"knot-like thing\" to the bone of his foot (indicates 4th met head). This had started to improve seemingly on own. Went to PCP and was given topical antifungal. Notes no real drainage he remembered but does have a scabbed area.         The following portions of the patient's history were reviewed and updated as appropriate: allergies, current medications, past family history, past " medical history, past social history, past surgical history, and problem list.    Review of Systems   Constitutional:  Negative for activity change, chills and fever.   HENT: Negative.     Respiratory:  Negative for cough, chest tightness and shortness of breath.    Cardiovascular:  Negative for chest pain and leg swelling.   Endocrine: Negative.    Genitourinary: Negative.    Neurological: Negative.  Negative for numbness.   Psychiatric/Behavioral: Negative.  Negative for agitation and behavioral problems.          Objective:      /68   Pulse 66   Ht 6' (1.829 m)   Wt 79.4 kg (175 lb)   BMI 23.73 kg/m²          Physical Exam  Constitutional:       General: He is not in acute distress.     Appearance: Normal appearance. He is not ill-appearing.   Cardiovascular:      Pulses: Normal pulses.      Comments: Bilateral DP & PT pulses 2/4. CRT WNL. Pedal hair present. Feet warm and well perfused.   Pulmonary:      Effort: No respiratory distress.   Musculoskeletal:         General: Tenderness (left 4th interdigital space, 4th metatarsal head) present.      Comments: Bilateral ankle, STJ, TNJ, TMTJ, MTPJ ROM WNL and without pain. LE muscle strength WNL.   Skin:     General: Skin is warm.      Capillary Refill: Capillary refill takes less than 2 seconds.      Findings: Lesion (left 4th interdigital space maceration with superficial apeparing punctate wounds. no erythema, edema, fluctuance, crepitus, purulence.) present. No erythema.   Neurological:      General: No focal deficit present.      Mental Status: He is alert and oriented to person, place, and time.      Sensory: No sensory deficit.      Comments: Gross sensation intact. Denies N/T/B to B/L feet.    Psychiatric:         Mood and Affect: Mood normal.         Behavior: Behavior normal.

## 2024-04-11 NOTE — PATIENT INSTRUCTIONS
Apply betadine and cotton ball btwn toes 4/5 daily.     Foot Pain Home Therapy    Wear supportive shoes at all times. Avoid flip-flops, flat sandals, barefoot walking (never walk barefoot, even at home). Generally avoid shoes that are too flexible and bend in the arch. Your shoes should only slightly bend in the toe area, not the middle. Running sneakers are often the best choice. Soft and wide in toe box  Supportive sneaker brands: Allison, On Cloud, Hoka, Altra, New Balance, Asics, Mizuno  [employer] (discount if mention Dr referred)  Fleet Feet [place]  [facility] [place]   [place] [employer] [place]  Supportive daily shoe brands: Vionic, Orthofeet, Deedee, Dansko, Chacos, Rosario, Teva, Birkenstock  Supportive home shoes: Bindu Kwan (recovery slides)  Look in to over the counter shoe inserts/arch supports such as Dananberg arch supports (take tabs out under 1st toe). These are all available on [employer].com as well as their individual website's.   [employer] [place]: Vasyli+Dananberg 1st Ray Orthotic, Medium, 1st Ray Function, Medium Density, Full-Length Insole, Heat Molding Optional, Best All Around Orthotic, Functional Biomechanical Control for Pain Relief, Black Yellow (03123) : Health & Household

## 2024-04-24 ENCOUNTER — OFFICE VISIT (OUTPATIENT)
Dept: CARDIAC SURGERY | Facility: CLINIC | Age: 54
End: 2024-04-24
Payer: COMMERCIAL

## 2024-04-24 VITALS
TEMPERATURE: 98.8 F | WEIGHT: 167.55 LBS | HEART RATE: 69 BPM | OXYGEN SATURATION: 98 % | SYSTOLIC BLOOD PRESSURE: 145 MMHG | HEIGHT: 72 IN | DIASTOLIC BLOOD PRESSURE: 82 MMHG | RESPIRATION RATE: 14 BRPM | BODY MASS INDEX: 22.69 KG/M2

## 2024-04-24 DIAGNOSIS — J43.2 CENTRILOBULAR EMPHYSEMA (HCC): ICD-10-CM

## 2024-04-24 DIAGNOSIS — C34.11 PRIMARY ADENOCARCINOMA OF UPPER LOBE OF RIGHT LUNG (HCC): Primary | ICD-10-CM

## 2024-04-24 PROCEDURE — 99213 OFFICE O/P EST LOW 20 MIN: CPT | Performed by: THORACIC SURGERY (CARDIOTHORACIC VASCULAR SURGERY)

## 2024-04-25 NOTE — PROGRESS NOTES
Thoracic Follow-Up  Assessment/Plan:   53-year-old male with history of stage I A2 right upper lobe adenocarcinoma status post 6/7/2021 robotic right upper lobectomy doing well with no recurrence    I had a good conversation with the patient and his family about his most recent surveillance scan.  This shows no evidence of recurrent or metastatic disease.  At this point I would recommend just ongoing observation with a repeat scan yearly.  Will order this now and see him back in our office afterwards.  I have asked him to call sooner with any concerning signs or symptoms such as hemoptysis, worsening shortness of breath, prolonged lung infections or other breathing concerns.  They voiced understanding and appreciation    [facility]      Diagnoses and all orders for this visit:    Primary adenocarcinoma of upper lobe of right lung (HCC)  -     CT chest wo contrast; Future  -     Ambulatory Referral to Pulmonology; Future    Centrilobular emphysema (HCC)  -     Ambulatory Referral to Pulmonology; Future          Thoracic History    Cancer Staging   Primary adenocarcinoma of upper lobe of right lung (HCC)  Staging form: Lung, AJCC 8th Edition  - Pathologic stage from 6/7/2021: Stage IA2 (ypT1b, pN0, cM0) - Signed by Emily Powell PA-C on 10/5/2022  Stage prefix: Post-therapy      Oncology History   Primary adenocarcinoma of upper lobe of right lung (HCC)   6/7/2021 -  Cancer Staged     Staging form: Lung, AJCC 8th Edition  - Pathologic stage from 6/7/2021: Stage IA2 (ypT1b, pN0, cM0) - Signed by Emily Powell PA-C on 10/5/2022  Stage prefix: Post-therapy         1/19/2022 Initial Diagnosis     Primary adenocarcinoma of upper lobe of right lung (HCC)                Subjective:    Patient ID: Prieto Dunlap III is a 53 y.o. male.    HPI  Prieto comes back to our office just shy of 3 years out from a right upper lobectomy for a right upper lobe stage I adenocarcinoma.  Overall he is doing well.  He denies any  respiratory symptoms at this time.  No shortness of breath.  No cough.  No hemoptysis.  He is still refraining from smoking.    The following portions of the patient's history were reviewed and updated as appropriate: allergies, current medications, past family history, past medical history, past social history, past surgical history and problem list.    Review of Systems   Constitutional:  Negative for activity change, appetite change, chills, diaphoresis, fatigue, fever and unexpected weight change.   HENT: Negative.     Eyes: Negative.    Respiratory:  Negative for apnea, cough, chest tightness, shortness of breath, wheezing and stridor.    Cardiovascular:  Negative for chest pain, palpitations and leg swelling.   Gastrointestinal:  Negative for abdominal distention, abdominal pain, blood in stool, constipation, diarrhea, nausea and vomiting.   Endocrine: Negative.    Genitourinary: Negative.    Musculoskeletal: Negative.    Skin: Negative.    Allergic/Immunologic: Negative.    Neurological: Negative.    Hematological: Negative.    Psychiatric/Behavioral: Negative.           Objective:   Physical Exam  Vitals and nursing note reviewed.   Constitutional:       General: He is not in acute distress.     Appearance: He is well-developed. He is not diaphoretic.   HENT:      Head: Normocephalic and atraumatic.      Mouth/Throat:      Pharynx: No oropharyngeal exudate.   Eyes:      General: No scleral icterus.     Conjunctiva/sclera: Conjunctivae normal.      Pupils: Pupils are equal, round, and reactive to light.   Neck:      Thyroid: No thyromegaly.      Vascular: No JVD.      Trachea: No tracheal deviation.   Cardiovascular:      Rate and Rhythm: Normal rate and regular rhythm.      Heart sounds: Normal heart sounds. No murmur heard.     No friction rub. No gallop.   Pulmonary:      Effort: Pulmonary effort is normal. No respiratory distress.      Breath sounds: Normal breath sounds. No wheezing or rales.   Chest:       Chest wall: No tenderness.   Abdominal:      General: Bowel sounds are normal. There is no distension.      Palpations: Abdomen is soft. There is no mass.      Tenderness: There is no abdominal tenderness. There is no guarding or rebound.   Musculoskeletal:         General: No tenderness or deformity. Normal range of motion.      Cervical back: Normal range of motion and neck supple.   Skin:     General: Skin is warm and dry.      Coloration: Skin is not pale.      Findings: No erythema or rash.   Neurological:      Mental Status: He is alert and oriented to person, place, and time.   Psychiatric:         Behavior: Behavior normal.         Thought Content: Thought content normal.         Judgment: Judgment normal.     /82 (BP Location: Left arm, Patient Position: Sitting, Cuff Size: Standard)   Pulse 69   Temp 98.8 °F (37.1 °C) (Temporal)   Resp 14   Ht 6' (1.829 m)   Wt 76 kg (167 lb 8.8 oz)   SpO2 98%   BMI 22.72 kg/m²     No Chest XR results available for this patient.   CT chest wo contrast    Result Date: 4/8/2024  Narrative CT CHEST WITHOUT IV CONTRAST INDICATION:   Malignant neoplasm of upper lobe, right bronchus or lung. . COMPARISON: Multiple prior exams, most recent dated 4/3/2023 TECHNIQUE: CT examination of the chest was performed without intravenous contrast. Multiplanar 2D reformatted images were created from the source data. This examination, like all CT scans performed in the [facility], was performed utilizing techniques to minimize radiation dose exposure, including the use of iterative reconstruction and automated exposure control. Radiation dose length product (DLP) for this visit: FINDINGS: LUNGS: Postsurgical changes of right upper lobectomy. Unchanged 6 mm left upper lobe pulmonary nodule (series 3, image 54). Unchanged 6 mm right lower lobe pulmonary nodule) series 3, image 102). Mild to moderate pulmonary emphysema no new pulmonary findings.  There is no  tracheal or endobronchial lesion. PLEURA:  Unremarkable. HEART/GREAT VESSELS: Heart is unremarkable for patient's age.  No thoracic aortic aneurysm. Moderate to severe coronary artery calcification MEDIASTINUM AND SOHA:  Unremarkable. CHEST WALL AND LOWER NECK:  Unremarkable. VISUALIZED STRUCTURES IN THE UPPER ABDOMEN: Gastric bypass. Cholecystectomy. OSSEOUS STRUCTURES:  No acute fracture or destructive osseous lesion.     Impression Unchanged appearance of the chest since prior CT. No new or suspicious findings. Electronically signed: 04/08/2024 10:14 AM Yuriy Nuñez MD    CT chest abdomen pelvis w contrast    Result Date: 5/10/2023  Narrative PROCEDURE INFORMATION: Exam: CT Chest With Contrast; Diagnostic Exam date and time: 5/10/2023 4:52 PM Age: 52 years old Clinical indication: Other: Found on floor; Patient HX: Per tp patient walked in to the er, after registering, he sat him down to do vital signs. Patient was tearful and upset, patient looked up at the ceiling and went limp. Tp came to get this rn, upon going to the lobby i found patient lying on the floor. Patient would not respond to me and was starring; Additional info: Polytrauma, blunt TECHNIQUE: Imaging protocol: Diagnostic computed tomography of the chest with contrast. Radiation optimization: All CT scans at this facility use at least one of these dose optimization techniques: automated exposure control; mA and/or kV adjustment per patient size (includes targeted exams where dose is matched to clinical indication); or iterative reconstruction. Contrast material: OMNIAPQUE 350; Contrast volume: 100 ml; Contrast route: INTRAVENOUS (IV);   REPORTING DATA: Count of CT and Cardiac NM exams in prior 12 months: This patient has received 0 known CTs and 0 known cardiac nuclear medicine studies in the 12 months prior to the current study. COMPARISON: DX XR CHEST 1 VW PORTABLE 5/10/2023 3:23 PM FINDINGS: Trachea: Small secretions distal airways right  middle lobe. Lungs: There are emphysematous changes. No lung contusion. No consolidation. Status post right upper lobe resection. Pleural spaces:  No pneumothorax. No pleural effusion. Heart: Unremarkable. No cardiomegaly. No pericardial effusion. Lymph nodes: Unremarkable. No enlarged lymph nodes. Vasculature: Aorta is unremarkable. No central pulmonary embolus. Bones/joints: Remote fractures anterior right 3rd through 5th ribs. Soft tissues: Unremarkable.     Impression IMPRESSION: 1.   No acute finding. 2.   No central pulmonary embolus. COMMENTS: In the absence of a history or active diagnosis of lung cancer, it is recommended that this patient with emphysema be evaluated for enrollment in a low dose CT lung cancer screening program. ========================= PROCEDURE INFORMATION: Exam: CT Abdomen And Pelvis With Contrast Exam date and time: 5/10/2023 4:52 PM Age: 52 years old Clinical indication: Other: Found on floor; Patient HX: Per tp patient walked in to the er, after registering, he sat him down to do vital signs. Patient was tearful and upset, patient looked up at the ceiling and went limp. Tp came to get this rn, upon going to the lobby i found patient lying on the floor. Patient would not respond to me and was starring; Additional info: Polytrauma, blunt TECHNIQUE: Imaging protocol: Computed tomography of the abdomen and pelvis with contrast. Radiation optimization: All CT scans at this facility use at least one of these dose optimization techniques: automated exposure control; mA and/or kV adjustment per patient size (includes targeted exams where dose is matched to clinical indication); or iterative reconstruction. Contrast material: OMNIAPQUE 350; Contrast volume: 100 ml; Contrast route: INTRAVENOUS (IV);   REPORTING DATA: Count of CT and Cardiac NM exams in prior 12 months: This patient has received 0 known CTs and 0 known cardiac nuclear medicine studies in the 12 months prior to the current  study. COMPARISON: DX XR PELVIS 1 OR 2 VW 5/10/2023 3:24 PM FINDINGS: Liver: There is no evidence of liver injury. Gallbladder and bile ducts: No significant dilation of the biliary system. Status post cholecystectomy. Pancreas: Unremarkable. Spleen: There is no evidence of splenic injury. Adrenal glands: Unremarkable. Kidneys and ureters: There is no evidence of renal injury. No hydronephrosis. Subcentimeter hypodensity right kidney, too small to accurately characterize. Stomach and bowel: No obstruction. No mucosal thickening. Status post gastric bypass. Excluded stomach unremarkable. Gastrojejunostomy and jejunojejunostomy unremarkable. Mild colonic diverticulosis. No obstruction. Appendix: Appendix is not visualised and there are no secondary signs of appendicitis in its expected location. Intraperitoneal space: No free fluid. Vasculature: Diffuse moderate atherosclerotic calcification. Lymph nodes: No enlarged lymph nodes. Urinary bladder: Bladder is decompressed, with apparent diffuse wall thickening. Reproductive: 50 mm transverse measurement prostate gland. Bones/joints: Unremarkable. Soft tissues: Incidental fat containing right inguinal hernia. IMPRESSION: 1.   Circumferential bladder wall thickening, which may be related to underdistention, cystitis or chronic outlet obstruction. Please correlate with urinalysis. 2.   Diverticulosis. 3.   Nonemergent findings as detailed in body of report. COMMENTS: Consistent with the American College of Radiology's Incidental Findings Committee white paper (J Am Mariajose Radiol 2018): Any incidental renal lesion less than 1 cm or classified as too small to characterize, or any incidental cystic renal lesion characterized as simple-appearing, is likely benign. No follow-up imaging is recommended for these lesions per consensus recommendations based on imaging criteria.      I personally reviewed his CT imaging from 4/8/2024 and discussed this with the patient.  This scan  shows no suspicious lesions.  No adenopathy.  No concern for recurrent or metastatic disease.    Miguel Felix MD  Thoracic Surgeon    (Available by [employer])

## 2024-05-21 ENCOUNTER — TELEPHONE (OUTPATIENT)
Age: 54
End: 2024-05-21

## 2024-05-21 DIAGNOSIS — G43.001 MIGRAINE WITHOUT AURA AND WITH STATUS MIGRAINOSUS, NOT INTRACTABLE: ICD-10-CM

## 2024-05-22 RX ORDER — TOPIRAMATE 50 MG/1
TABLET, FILM COATED ORAL
Qty: 60 TABLET | Refills: 5 | Status: SHIPPED | OUTPATIENT
Start: 2024-05-22

## 2024-06-27 ENCOUNTER — VBI (OUTPATIENT)
Dept: ADMINISTRATIVE | Facility: OTHER | Age: 54
End: 2024-06-27

## 2024-07-22 ENCOUNTER — VBI (OUTPATIENT)
Dept: ADMINISTRATIVE | Facility: OTHER | Age: 54
End: 2024-07-22

## 2024-07-22 NOTE — TELEPHONE ENCOUNTER
07/22/24 10:43 AM     Chart reviewed for Diabetic Eye Exam was/were not submitted to the patient's insurance.     Ania Lorenzo MA   [facility]

## 2024-09-17 ENCOUNTER — VBI (OUTPATIENT)
Dept: ADMINISTRATIVE | Facility: OTHER | Age: 54
End: 2024-09-17

## 2024-09-17 NOTE — TELEPHONE ENCOUNTER
09/17/24 9:56 AM     Chart reviewed for Diabetic Eye Exam was/were not submitted to the patient's insurance.     Ania Lorenzo MA   [facility]

## 2024-10-14 DIAGNOSIS — E78.5 HYPERLIPIDEMIA, UNSPECIFIED HYPERLIPIDEMIA TYPE: ICD-10-CM

## 2024-10-14 RX ORDER — ATORVASTATIN CALCIUM 20 MG/1
TABLET, FILM COATED ORAL
Qty: 30 TABLET | Refills: 5 | Status: SHIPPED | OUTPATIENT
Start: 2024-10-14

## 2024-11-14 DIAGNOSIS — F51.01 PRIMARY INSOMNIA: ICD-10-CM

## 2024-11-14 DIAGNOSIS — I10 ESSENTIAL HYPERTENSION: ICD-10-CM

## 2024-11-14 RX ORDER — TRAZODONE HYDROCHLORIDE 100 MG/1
TABLET ORAL
Qty: 30 TABLET | Refills: 2 | Status: SHIPPED | OUTPATIENT
Start: 2024-11-14

## 2024-11-14 RX ORDER — AMLODIPINE BESYLATE 5 MG/1
TABLET ORAL
Qty: 30 TABLET | Refills: 2 | Status: SHIPPED | OUTPATIENT
Start: 2024-11-14

## 2024-12-19 DIAGNOSIS — G43.001 MIGRAINE WITHOUT AURA AND WITH STATUS MIGRAINOSUS, NOT INTRACTABLE: ICD-10-CM

## 2024-12-20 RX ORDER — TOPIRAMATE 50 MG/1
TABLET, FILM COATED ORAL
Qty: 60 TABLET | Refills: 5 | Status: SHIPPED | OUTPATIENT
Start: 2024-12-20

## 2025-01-16 DIAGNOSIS — K21.9 GASTROESOPHAGEAL REFLUX DISEASE, UNSPECIFIED WHETHER ESOPHAGITIS PRESENT: ICD-10-CM

## 2025-01-16 DIAGNOSIS — J40 BRONCHITIS: ICD-10-CM

## 2025-01-16 RX ORDER — OMEPRAZOLE 40 MG/1
CAPSULE, DELAYED RELEASE ORAL
Qty: 90 CAPSULE | Refills: 1 | Status: SHIPPED | OUTPATIENT
Start: 2025-01-16

## 2025-01-16 RX ORDER — SERTRALINE HYDROCHLORIDE 100 MG/1
TABLET, FILM COATED ORAL
Qty: 30 TABLET | Refills: 0 | Status: SHIPPED | OUTPATIENT
Start: 2025-01-16

## 2025-01-16 NOTE — TELEPHONE ENCOUNTER
Patient needs an appointment. Please contact the patient to schedule an appointment.courtesy refill given

## 2025-02-18 ENCOUNTER — TELEPHONE (OUTPATIENT)
Dept: FAMILY MEDICINE CLINIC | Facility: CLINIC | Age: 55
End: 2025-02-18

## 2025-02-18 DIAGNOSIS — F51.01 PRIMARY INSOMNIA: ICD-10-CM

## 2025-02-18 DIAGNOSIS — I10 ESSENTIAL HYPERTENSION: ICD-10-CM

## 2025-02-18 DIAGNOSIS — J40 BRONCHITIS: ICD-10-CM

## 2025-02-18 NOTE — TELEPHONE ENCOUNTER
R/S AS PER PROV REQ, NOT IN OFFICE, LEFT VM 2/18. NEEDS TO BE ANNUAL MED WELL..yearly check up, NO SATURDAYS

## 2025-02-19 RX ORDER — AMLODIPINE BESYLATE 5 MG/1
TABLET ORAL
Qty: 30 TABLET | Refills: 0 | Status: SHIPPED | OUTPATIENT
Start: 2025-02-19

## 2025-02-19 RX ORDER — SERTRALINE HYDROCHLORIDE 100 MG/1
TABLET, FILM COATED ORAL
Qty: 30 TABLET | Refills: 0 | Status: SHIPPED | OUTPATIENT
Start: 2025-02-19

## 2025-02-19 RX ORDER — TRAZODONE HYDROCHLORIDE 100 MG/1
TABLET ORAL
Qty: 30 TABLET | Refills: 0 | Status: SHIPPED | OUTPATIENT
Start: 2025-02-19

## 2025-03-13 ENCOUNTER — RA CDI HCC (OUTPATIENT)
Dept: OTHER | Facility: HOSPITAL | Age: 55
End: 2025-03-13

## 2025-03-18 ENCOUNTER — OFFICE VISIT (OUTPATIENT)
Dept: FAMILY MEDICINE CLINIC | Facility: CLINIC | Age: 55
End: 2025-03-18
Payer: COMMERCIAL

## 2025-03-18 VITALS
HEIGHT: 72 IN | WEIGHT: 172.2 LBS | TEMPERATURE: 98.1 F | SYSTOLIC BLOOD PRESSURE: 126 MMHG | OXYGEN SATURATION: 96 % | RESPIRATION RATE: 18 BRPM | HEART RATE: 70 BPM | BODY MASS INDEX: 23.32 KG/M2 | DIASTOLIC BLOOD PRESSURE: 78 MMHG

## 2025-03-18 DIAGNOSIS — Z12.5 SCREENING FOR PROSTATE CANCER: ICD-10-CM

## 2025-03-18 DIAGNOSIS — K63.5 POLYP OF COLON, UNSPECIFIED PART OF COLON, UNSPECIFIED TYPE: ICD-10-CM

## 2025-03-18 DIAGNOSIS — J43.2 CENTRILOBULAR EMPHYSEMA (HCC): ICD-10-CM

## 2025-03-18 DIAGNOSIS — E55.9 VITAMIN D DEFICIENCY: ICD-10-CM

## 2025-03-18 DIAGNOSIS — C34.11 PRIMARY ADENOCARCINOMA OF UPPER LOBE OF RIGHT LUNG (HCC): ICD-10-CM

## 2025-03-18 DIAGNOSIS — I27.20 MILD PULMONARY HYPERTENSION (HCC): ICD-10-CM

## 2025-03-18 DIAGNOSIS — Z00.00 MEDICARE ANNUAL WELLNESS VISIT, SUBSEQUENT: Primary | ICD-10-CM

## 2025-03-18 DIAGNOSIS — F33.1 MAJOR DEPRESSIVE DISORDER, RECURRENT EPISODE, MODERATE (HCC): ICD-10-CM

## 2025-03-18 PROCEDURE — G0439 PPPS, SUBSEQ VISIT: HCPCS | Performed by: INTERNAL MEDICINE

## 2025-03-18 NOTE — ASSESSMENT & PLAN NOTE
Orders:    CBC and Platelet; Future  Pt has followup CT and CT surgery appt in April   Stop smoking!

## 2025-03-18 NOTE — PATIENT INSTRUCTIONS
Medicare Preventive Visit Patient Instructions  Thank you for completing your Welcome to Medicare Visit or Medicare Annual Wellness Visit today. Your next wellness visit will be due in one year (3/19/2026).  The screening/preventive services that you may require over the next 5-10 years are detailed below. Some tests may not apply to you based off risk factors and/or age. Screening tests ordered at today's visit but not completed yet may show as past due. Also, please note that scanned in results may not display below.  Preventive Screenings:  Service Recommendations Previous Testing/Comments   Colorectal Cancer Screening  Colonoscopy    Fecal Occult Blood Test (FOBT)/Fecal Immunochemical Test (FIT)  Fecal DNA/Cologuard Test  Flexible Sigmoidoscopy Age: 45-75 years old   Colonoscopy: every 10 years (May be performed more frequently if at higher risk)  OR  FOBT/FIT: every 1 year  OR  Cologuard: every 3 years  OR  Sigmoidoscopy: every 5 years  Screening may be recommended earlier than age 45 if at higher risk for colorectal cancer. Also, an individualized decision between you and your healthcare provider will decide whether screening between the ages of 76-85 would be appropriate. Colonoscopy: 04/24/2019  FOBT/FIT: Not on file  Cologuard: Not on file  Sigmoidoscopy: Not on file    Screening Current     Prostate Cancer Screening Individualized decision between patient and health care provider in men between ages of 55-69   Medicare will cover every 12 months beginning on the day after your 50th birthday PSA: 0.4 ng/mL           Hepatitis C Screening Once for adults born between 1945 and 1965  More frequently in patients at high risk for Hepatitis C Hep C Antibody: 03/25/2016    Screening Current   Diabetes Screening 1-2 times per year if you're at risk for diabetes or have pre-diabetes Fasting glucose: 103 mg/dL (3/15/2024)  A1C: 5.8 % (5/13/2023)      Cholesterol Screening Once every 5 years if you don't have a lipid  disorder. May order more often based on risk factors. Lipid panel: 03/15/2024  Screening Not Indicated  History Lipid Disorder      Other Preventive Screenings Covered by Medicare:  Abdominal Aortic Aneurysm (AAA) Screening: covered once if your at risk. You're considered to be at risk if you have a family history of AAA or a male between the age of 65-75 who smoking at least 100 cigarettes in your lifetime.  Lung Cancer Screening: covers low dose CT scan once per year if you meet all of the following conditions: (1) Age 55-77; (2) No signs or symptoms of lung cancer; (3) Current smoker or have quit smoking within the last 15 years; (4) You have a tobacco smoking history of at least 20 pack years (packs per day x number of years you smoked); (5) You get a written order from a healthcare provider.  Glaucoma Screening: covered annually if you're considered high risk: (1) You have diabetes OR (2) Family history of glaucoma OR (3)  aged 50 and older OR (4)  American aged 65 and older  Osteoporosis Screening: covered every 2 years if you meet one of the following conditions: (1) Have a vertebral abnormality; (2) On glucocorticoid therapy for more than 3 months; (3) Have primary hyperparathyroidism; (4) On osteoporosis medications and need to assess response to drug therapy.  HIV Screening: covered annually if you're between the age of 15-65. Also covered annually if you are younger than 15 and older than 65 with risk factors for HIV infection. For pregnant patients, it is covered up to 3 times per pregnancy.    Immunizations:  Immunization Recommendations   Influenza Vaccine Annual influenza vaccination during flu season is recommended for all persons aged >= 6 months who do not have contraindications   Pneumococcal Vaccine   * Pneumococcal conjugate vaccine = PCV13 (Prevnar 13), PCV15 (Vaxneuvance), PCV20 (Prevnar 20)  * Pneumococcal polysaccharide vaccine = PPSV23 (Pneumovax) Adults 19-65 yo  with certain risk factors or if 65+ yo  If never received any pneumonia vaccine: recommend Prevnar 20 (PCV20)  Give PCV20 if previously received 1 dose of PCV13 or PPSV23   Hepatitis B Vaccine 3 dose series if at intermediate or high risk (ex: diabetes, end stage renal disease, liver disease)   Respiratory syncytial virus (RSV) Vaccine - COVERED BY MEDICARE PART D  * RSVPreF3 (Arexvy) CDC recommends that adults 60 years of age and older may receive a single dose of RSV vaccine using shared clinical decision-making (SCDM)   Tetanus (Td) Vaccine - COST NOT COVERED BY MEDICARE PART B Following completion of primary series, a booster dose should be given every 10 years to maintain immunity against tetanus. Td may also be given as tetanus wound prophylaxis.   Tdap Vaccine - COST NOT COVERED BY MEDICARE PART B Recommended at least once for all adults. For pregnant patients, recommended with each pregnancy.   Shingles Vaccine (Shingrix) - COST NOT COVERED BY MEDICARE PART B  2 shot series recommended in those 19 years and older who have or will have weakened immune systems or those 50 years and older     Health Maintenance Due:      Topic Date Due   • HIV Screening  Never done   • Colorectal Cancer Screening  04/24/2024   • Hepatitis C Screening  Completed   • Lung Cancer Screening  Discontinued     Immunizations Due:      Topic Date Due   • Pneumococcal Vaccine: Pediatrics (0 to 5 Years) and At-Risk Patients (6 to 64 Years) (1 of 2 - PCV) Never done   • Influenza Vaccine (1) 09/01/2024   • COVID-19 Vaccine (3 - 2024-25 season) 09/01/2024     Advance Directives   What are advance directives?  Advance directives are legal documents that state your wishes and plans for medical care. These plans are made ahead of time in case you lose your ability to make decisions for yourself. Advance directives can apply to any medical decision, such as the treatments you want, and if you want to donate organs.   What are the types of  advance directives?  There are many types of advance directives, and each state has rules about how to use them. You may choose a combination of any of the following:  Living will:  This is a written record of the treatment you want. You can also choose which treatments you do not want, which to limit, and which to stop at a certain time. This includes surgery, medicine, IV fluid, and tube feedings.   Durable power of  for healthcare (DPAHC):  This is a written record that states who you want to make healthcare choices for you when you are unable to make them for yourself. This person, called a proxy, is usually a family member or a friend. You may choose more than 1 proxy.  Do not resuscitate (DNR) order:  A DNR order is used in case your heart stops beating or you stop breathing. It is a request not to have certain forms of treatment, such as CPR. A DNR order may be included in other types of advance directives.  Medical directive:  This covers the care that you want if you are in a coma, near death, or unable to make decisions for yourself. You can list the treatments you want for each condition. Treatment may include pain medicine, surgery, blood transfusions, dialysis, IV or tube feedings, and a ventilator (breathing machine).  Values history:  This document has questions about your views, beliefs, and how you feel and think about life. This information can help others choose the care that you would choose.  Why are advance directives important?  An advance directive helps you control your care. Although spoken wishes may be used, it is better to have your wishes written down. Spoken wishes can be misunderstood, or not followed. Treatments may be given even if you do not want them. An advance directive may make it easier for your family to make difficult choices about your care.   Cigarette Smoking and Your Health   Risks to your health if you smoke:  Nicotine and other chemicals found in tobacco damage  every cell in your body. Even if you are a light smoker, you have an increased risk for cancer, heart disease, and lung disease. If you are pregnant or have diabetes, smoking increases your risk for complications.   Benefits to your health if you stop smoking:   You decrease respiratory symptoms such as coughing, wheezing, and shortness of breath.   You reduce your risk for cancers of the lung, mouth, throat, kidney, bladder, pancreas, stomach, and cervix. If you already have cancer, you increase the benefits of chemotherapy. You also reduce your risk for cancer returning or a second cancer from developing.   You reduce your risk for heart disease, blood clots, heart attack, and stroke.   You reduce your risk for lung infections, and diseases such as pneumonia, asthma, chronic bronchitis, and emphysema.  Your circulation improves. More oxygen can be delivered to your body. If you have diabetes, you lower your risk for complications, such as kidney, artery, and eye diseases. You also lower your risk for nerve damage. Nerve damage can lead to amputations, poor vision, and blindness.  You improve your body's ability to heal and to fight infections.  For more information and support to stop smoking:   Smokefree.gov  Phone: 1- 864 - 999-4474  Web Address: www.[employer].gov     © Copyright [employer] 2018 Information is for End User's use only and may not be sold, redistributed or otherwise used for commercial purposes. All illustrations and images included in CareNotes® are the copyrighted property of [employer] Inc[employer] or [employer]

## 2025-03-18 NOTE — PROGRESS NOTES
Name: Prieto Dunlap III      : 1970      MRN: [id number]  Encounter Provider: Kiera Edwards DO  Encounter Date: 3/18/2025   Encounter department: [facility]    Assessment & Plan  Primary adenocarcinoma of upper lobe of right lung (HCC)    Orders:  •  CBC and Platelet; Future  Pt has followup CT and CT surgery appt in April   Stop smoking!  Mild pulmonary hypertension (HCC)  Pt sxs managed He is compliant with med rx        Centrilobular emphysema (HCC)  Stop smoking encouraged Sxs managed at this time He has CT         Major depressive disorder, recurrent episode, moderate (HCC)  Stable and he is compliant with medications daily          Medicare annual wellness visit, subsequent    Orders:  •  Lipid panel; Future  •  Comprehensive metabolic panel; Future  Rx for fbw   Polyp of colon, unspecified part of colon, unspecified type    Orders:  •  Ambulatory Referral to Gastroenterology; Future  Pt aware overdue for screening colonoscopy and will schedule at [place]   Screening for prostate cancer    Orders:  •  PSA, Total Screen; Future    Vitamin D deficiency    Orders:  •  Vitamin D 25 hydroxy; Future  Pt not taking supplement but does take mvi Recheck level since he has had deficit in past     Depression Screening and Follow-up Plan: Patient was screened for depression during today's encounter. They screened negative with a PHQ-9 score of 0.      Tobacco Cessation Counseling: Tobacco cessation counseling was provided. The patient is sincerely urged to quit consumption of tobacco. He is not ready to quit tobacco.     Preventive health issues were discussed with patient, and age appropriate screening tests were ordered as noted in patient's After Visit Summary. Personalized health advice and appropriate referrals for health education or preventive services given if needed, as noted in patient's After Visit Summary.    History of Present Illness     HPI   Pt doing ok He continues to have  family stressors but managing ok He helps at the  daily He denies any recent illness but has repeat CT and ct surgery eval in April He is still smoking He takes his meds daily No cough or sob Appetite stable   Patient Care Team:  Kiera Edwards,  as PCP - General  Kiera Edwards, DO as PCP - [facility]CP-[facility]  Kiera Edwards[facility] DO [facility] - [facility]  MD Kiera Kahn DO Glenn Freed, DO Kaveh Kousari, MD Yacoub Faroun, MD Yecheskel Schneider, MD as Endoscopist    Review of Systems   Constitutional:  Negative for chills and fever.   HENT: Negative.     Eyes:  Negative for visual disturbance.   Respiratory:  Negative for cough and shortness of breath.    Cardiovascular: Negative.    Gastrointestinal: Negative.    Genitourinary: Negative.    Musculoskeletal:  Positive for arthralgias.   Neurological:  Negative for dizziness, light-headedness and numbness.   Psychiatric/Behavioral:  Negative for sleep disturbance. The patient is not nervous/anxious.      Medical History Reviewed by provider this encounter:  Tobacco  Allergies  Meds  Problems  Med Hx  Surg Hx  Fam Hx       Annual Wellness Visit Questionnaire   Prieto is here for his Subsequent Wellness visit. Last Medicare Wellness visit information reviewed, patient interviewed, no change since last AWV.     Health Risk Assessment:   Patient rates overall health as fair. Patient feels that their physical health rating is slightly worse. Patient is satisfied with their life. Eyesight was rated as same. Hearing was rated as same. Patient feels that their emotional and mental health rating is same. Patients states they are never, rarely angry. Patient states they are never, rarely unusually tired/fatigued. Pain experienced in the last 7 days has been none. Patient states that he has experienced no weight loss or gain in last 6 months.     Depression Screening:   PHQ-9 Score: 0      Fall Risk Screening:   In  the past year, patient has experienced: no history of falling in past year      Home Safety:  Patient does not have trouble with stairs inside or outside of their home. Patient has working smoke alarms and has working carbon monoxide detector. Home safety hazards include: none.     Nutrition:   Current diet is Regular.     Medications:   Patient is currently taking over-the-counter supplements. OTC medications include: see medication list. Patient is able to manage medications.     Activities of Daily Living (ADLs)/Instrumental Activities of Daily Living (IADLs):   Walk and transfer into and out of bed and chair?: Yes  Dress and groom yourself?: Yes    Bathe or shower yourself?: Yes    Feed yourself? Yes  Do your laundry/housekeeping?: Yes  Manage your money, pay your bills and track your expenses?: Yes  Make your own meals?: Yes    Do your own shopping?: Yes    Previous Hospitalizations:   Any hospitalizations or ED visits within the last 12 months?: No      Advance Care Planning:   Living will: No    Durable POA for healthcare: No    Advanced directive: No    Advanced directive counseling given: Yes    ACP document given: Yes    End of Life Decisions reviewed with patient: Yes    Provider agrees with end of life decisions: Yes      Cognitive Screening:   Provider or family/friend/caregiver concerned regarding cognition?: No    PREVENTIVE SCREENINGS      Cardiovascular Screening:    General: History Lipid Disorder and Risks and Benefits Discussed    Due for: Lipid Panel      Diabetes Screening:     General: Screening Not Indicated      Colorectal Cancer Screening:     General: Screening Current    Due for: Colonoscopy - Low Risk      Prostate Cancer Screening:    General: Risks and Benefits Discussed      Osteoporosis Screening:    General: Screening Not Indicated      Abdominal Aortic Aneurysm (AAA) Screening:    Risk factors include: tobacco use        General: Screening Current      Lung Cancer Screening:      General: Screening Not Indicated and History Lung Cancer      Hepatitis C Screening:    General: Screening Current    Screening, Brief Intervention, and Referral to Treatment (SBIRT)     Screening  Typical number of drinks in a day: 0  Typical number of drinks in a week: 2  Interpretation: Low risk drinking behavior.    AUDIT-C Screenin) How often did you have a drink containing alcohol in the past year? never  2) How many drinks did you have on a typical day when you were drinking in the past year? 0  3) How often did you have 6 or more drinks on one occasion in the past year? never    AUDIT-C Score: 0  Interpretation: Score 0-3 (male): Negative screen for alcohol misuse    Single Item Drug Screening:  How often have you used an illegal drug (including marijuana) or a prescription medication for non-medical reasons in the past year? never    Single Item Drug Screen Score: 0  Interpretation: Negative screen for possible drug use disorder    Brief Intervention  Alcohol & drug use screenings were reviewed. No concerns regarding substance use disorder identified.     Other Counseling Topics:   Regular weightbearing exercise and calcium and vitamin D intake.     Social Drivers of Health     Financial Resource Strain: High Risk (2023)    Overall Financial Resource Strain (CARDIA)    • Difficulty of Paying Living Expenses: Hard   Food Insecurity: No Food Insecurity (3/16/2024)    Nursing - Inadequate Food Risk Classification    • Worried About Running Out of Food in the Last Year: Never true    • Ran Out of Food in the Last Year: Never true   Transportation Needs: No Transportation Needs (3/16/2024)    PRAPARE - Transportation    • Lack of Transportation (Medical): No    • Lack of Transportation (Non-Medical): No   Housing Stability: High Risk (3/16/2024)    Housing Stability Vital Sign    • Unable to Pay for Housing in the Last Year: No    • Number of Times Moved in the Last Year: 2    • Homeless in the Last  Year: No    Received from [email or web address]     No results found.    Objective   /78   Pulse 70   Temp 98.1 °F (36.7 °C) (Temporal)   Resp 18   Ht 6' (1.829 m)   Wt 78.1 kg (172 lb 3.2 oz)   SpO2 96%   BMI 23.35 kg/m²     Physical Exam  Vitals and nursing note reviewed.   Constitutional:       General: He is not in acute distress.     Appearance: Normal appearance. He is not ill-appearing, toxic-appearing or diaphoretic.   HENT:      Head: Normocephalic and atraumatic.      Right Ear: External ear normal.      Left Ear: External ear normal.      Nose: Nose normal.      Mouth/Throat:      Mouth: Mucous membranes are moist.   Eyes:      General: No scleral icterus.     Extraocular Movements: Extraocular movements intact.      Pupils: Pupils are equal, round, and reactive to light.   Cardiovascular:      Rate and Rhythm: Normal rate and regular rhythm.      Pulses: Normal pulses.   Pulmonary:      Effort: Pulmonary effort is normal. No respiratory distress.      Breath sounds: Normal breath sounds. No wheezing.   Abdominal:      General: Bowel sounds are normal. There is no distension.      Palpations: Abdomen is soft.      Tenderness: There is no abdominal tenderness.   Musculoskeletal:      Cervical back: Normal range of motion and neck supple.      Right lower leg: No edema.      Left lower leg: No edema.   Lymphadenopathy:      Cervical: No cervical adenopathy.   Skin:     General: Skin is warm and dry.      Coloration: Skin is not jaundiced or pale.   Neurological:      General: No focal deficit present.      Mental Status: He is alert and oriented to person, place, and time. Mental status is at baseline.      Cranial Nerves: No cranial nerve deficit.   Psychiatric:         Mood and Affect: Mood normal.         Behavior: Behavior normal.         Thought Content: Thought content normal.         Judgment: Judgment normal.

## 2025-03-20 DIAGNOSIS — E78.5 HYPERLIPIDEMIA, UNSPECIFIED HYPERLIPIDEMIA TYPE: ICD-10-CM

## 2025-03-20 DIAGNOSIS — I10 ESSENTIAL HYPERTENSION: ICD-10-CM

## 2025-03-20 DIAGNOSIS — F51.01 PRIMARY INSOMNIA: ICD-10-CM

## 2025-03-21 RX ORDER — AMLODIPINE BESYLATE 5 MG/1
5 TABLET ORAL DAILY
Qty: 30 TABLET | Refills: 5 | Status: SHIPPED | OUTPATIENT
Start: 2025-03-21

## 2025-03-21 RX ORDER — TRAZODONE HYDROCHLORIDE 100 MG/1
TABLET ORAL
Qty: 30 TABLET | Refills: 5 | Status: SHIPPED | OUTPATIENT
Start: 2025-03-21

## 2025-03-21 RX ORDER — ATORVASTATIN CALCIUM 20 MG/1
20 TABLET, FILM COATED ORAL DAILY
Qty: 30 TABLET | Refills: 5 | Status: SHIPPED | OUTPATIENT
Start: 2025-03-21

## 2025-03-31 ENCOUNTER — TELEPHONE (OUTPATIENT)
Age: 55
End: 2025-03-31

## 2025-03-31 ENCOUNTER — PREP FOR PROCEDURE (OUTPATIENT)
Age: 55
End: 2025-03-31

## 2025-03-31 DIAGNOSIS — Z12.11 SCREENING FOR COLON CANCER: Primary | ICD-10-CM

## 2025-03-31 NOTE — TELEPHONE ENCOUNTER
03/31/25  Screened by: Katt Lisa    Referring Provider     Pre- Screening:     There is no height or weight on file to calculate BMI.23.35  Height-6.0  Weight-172  Has patient been referred for a routine screening Colonoscopy? yes  Is the patient between 45-75 years old? yes      Previous Colonoscopy yes   If yes:    Date:     Facility:     Reason:         Does the patient want to see a Gastroenterologist prior to their procedure OR are they having any GI symptoms? no    Has the patient been hospitalized or had abdominal surgery in the past 6 months? no    Does the patient use supplemental oxygen? no    Does the patient take Coumadin, Lovenox, Plavix, Elliquis, Xarelto, or other blood thinning medication? no    Has the patient had a stroke, cardiac event, or stent placed in the past year? no      If patient is between 45yrs - 49yrs, please advise patient that we will have to confirm benefits & coverage with their insurance company for a routine screening colonoscopy.

## 2025-03-31 NOTE — LETTER
Hello,    Attached are your prep instructions for your upcoming procedure on 5/30/2025. If you have any questions, please give us a call at [phone].    Thank you,     [facility]

## 2025-03-31 NOTE — TELEPHONE ENCOUNTER
Scheduled date of colonoscopy (as of today):5/30/2025  Physician performing colonoscopy:Dr. Obregon  Location of colonoscopy:[facility]  Bowel prep reviewed with patient:sariah/dul  Instructions reviewed with patient by:TANK-sariah/dul prep instructions sent to [facility] under letters  Clearances: n/a

## 2025-04-01 ENCOUNTER — HOSPITAL ENCOUNTER (OUTPATIENT)
Dept: CT IMAGING | Facility: HOSPITAL | Age: 55
Discharge: HOME | End: 2025-04-01
Attending: THORACIC SURGERY (CARDIOTHORACIC VASCULAR SURGERY)
Payer: COMMERCIAL

## 2025-04-01 DIAGNOSIS — C34.11 PRIMARY ADENOCARCINOMA OF UPPER LOBE OF RIGHT LUNG (HCC): ICD-10-CM

## 2025-04-01 PROCEDURE — 71250 CT THORAX DX C-: CPT

## 2025-04-05 DIAGNOSIS — J40 BRONCHITIS: ICD-10-CM

## 2025-04-07 RX ORDER — SERTRALINE HYDROCHLORIDE 100 MG/1
100 TABLET, FILM COATED ORAL DAILY
Qty: 30 TABLET | Refills: 5 | Status: SHIPPED | OUTPATIENT
Start: 2025-04-07

## 2025-04-17 PROBLEM — Z00.00 MEDICARE ANNUAL WELLNESS VISIT, SUBSEQUENT: Status: RESOLVED | Noted: 2019-09-30 | Resolved: 2025-04-17

## 2025-04-30 ENCOUNTER — OFFICE VISIT (OUTPATIENT)
Dept: CARDIAC SURGERY | Facility: CLINIC | Age: 55
End: 2025-04-30
Payer: COMMERCIAL

## 2025-04-30 VITALS
HEART RATE: 74 BPM | RESPIRATION RATE: 15 BRPM | WEIGHT: 169.53 LBS | TEMPERATURE: 98.3 F | SYSTOLIC BLOOD PRESSURE: 135 MMHG | BODY MASS INDEX: 22.96 KG/M2 | OXYGEN SATURATION: 99 % | HEIGHT: 72 IN | DIASTOLIC BLOOD PRESSURE: 78 MMHG

## 2025-04-30 DIAGNOSIS — C34.11 PRIMARY ADENOCARCINOMA OF UPPER LOBE OF RIGHT LUNG (HCC): Primary | ICD-10-CM

## 2025-04-30 PROCEDURE — 99213 OFFICE O/P EST LOW 20 MIN: CPT | Performed by: THORACIC SURGERY (CARDIOTHORACIC VASCULAR SURGERY)

## 2025-04-30 PROCEDURE — G2211 COMPLEX E/M VISIT ADD ON: HCPCS | Performed by: THORACIC SURGERY (CARDIOTHORACIC VASCULAR SURGERY)

## 2025-04-30 NOTE — PROGRESS NOTES
Name: Prieto Dunlap III      : 1970      MRN: [id number]  Encounter Provider: Miguel Felix MD  Encounter Date: 2025   Encounter department: [facility]  :  Assessment & Plan  Primary adenocarcinoma of upper lobe of right lung (HCC)  54-year-old male with history of stage I A2 right upper lobe non-small cell lung cancer status post 2021 robotic right upper lobectomy doing well with no evidence of recurrent disease    I had a good conversation with Prieto and his wife this morning.  He is doing well almost 4 years out from surgery.  His scan shows no evidence of recurrence.  He is unfortunately back to smoking about 10 cigarettes/day.  He understands this increases his risk of a recurrence or a new lung cancer.  He will work on cutting back and hopefully quitting again.  He is on the patch now.    From a lung cancer standpoint he understands that his risk of recurrence is small probably less than 5% at this time.  We still do recommend once yearly surveillance imaging at least until he is 5 years out.  If that scan looks good then he is technically cured of his initial lung cancer.  After that due to his history of smoking along lung cancer we do recommend ongoing yearly surveillance imaging at least once a year.  He would like to do that closer to home.  We will arrange that through his primary after his scan next year.  Voiced understanding appreciation.  He will call us with any worsening shortness of breath, hemoptysis, unexplained weight loss or persistent infection.    Miguel Felix      Orders:    CT chest wo contrast; Future        History of Present Illness   HPI  Prieto Dunlap III is a 54 y.o. male who presents back to our office almost 4 years out from right upper lobectomy.  He is doing well at this time.  He denies any shortness of breath.  They did have recent lung infections were over these at this time.  He has a minor cough and is  unfortunate back to smoking about 10 cigarettes/day.  He has a patch on and is trying to cut back.  He understands the importance of this.  No other complaints.      Review of Systems   Constitutional:  Negative for activity change, appetite change, chills, diaphoresis, fatigue, fever and unexpected weight change.   HENT: Negative.     Eyes: Negative.    Respiratory:  Negative for apnea, cough, chest tightness, shortness of breath, wheezing and stridor.    Cardiovascular:  Negative for chest pain, palpitations and leg swelling.   Gastrointestinal:  Negative for abdominal distention, abdominal pain, blood in stool, constipation, diarrhea, nausea and vomiting.   Endocrine: Negative.    Genitourinary: Negative.    Musculoskeletal: Negative.    Skin: Negative.    Allergic/Immunologic: Negative.    Neurological: Negative.    Hematological: Negative.    Psychiatric/Behavioral: Negative.            Objective   /78 (BP Location: Right arm, Patient Position: Sitting, Cuff Size: Standard)   Pulse 74   Temp 98.3 °F (36.8 °C) (Temporal)   Resp 15   Ht 6' (1.829 m)   Wt 76.9 kg (169 lb 8.5 oz)   SpO2 99%   BMI 22.99 kg/m²      Physical Exam  Vitals and nursing note reviewed.   Constitutional:       General: He is not in acute distress.     Appearance: He is well-developed. He is not diaphoretic.   HENT:      Head: Normocephalic and atraumatic.      Mouth/Throat:      Pharynx: No oropharyngeal exudate.   Eyes:      General: No scleral icterus.     Conjunctiva/sclera: Conjunctivae normal.      Pupils: Pupils are equal, round, and reactive to light.   Neck:      Thyroid: No thyromegaly.      Vascular: No JVD.      Trachea: No tracheal deviation.   Cardiovascular:      Rate and Rhythm: Normal rate and regular rhythm.      Heart sounds: Normal heart sounds. No murmur heard.     No friction rub. No gallop.   Pulmonary:      Effort: Pulmonary effort is normal. No respiratory distress.      Breath sounds: Normal breath  sounds. No wheezing or rales.   Chest:      Chest wall: No tenderness.   Abdominal:      General: Bowel sounds are normal. There is no distension.      Palpations: Abdomen is soft. There is no mass.      Tenderness: There is no abdominal tenderness. There is no guarding or rebound.   Musculoskeletal:         General: No tenderness or deformity. Normal range of motion.      Cervical back: Normal range of motion and neck supple.   Skin:     General: Skin is warm and dry.      Coloration: Skin is not pale.      Findings: No erythema or rash.   Neurological:      Mental Status: He is alert and oriented to person, place, and time.   Psychiatric:         Behavior: Behavior normal.         Thought Content: Thought content normal.         Judgment: Judgment normal.     I personally reviewed the CT imaging in PACS.  This shows normal postoperative changes.  No effusion.  No pneumothorax.  No mediastinal adenopathy.  No suspicion for recurrent or metastatic disease.

## 2025-04-30 NOTE — ASSESSMENT & PLAN NOTE
54-year-old male with history of stage I A2 right upper lobe non-small cell lung cancer status post 6/7/2021 robotic right upper lobectomy doing well with no evidence of recurrent disease    I had a good conversation with Prieto and his wife this morning.  He is doing well almost 4 years out from surgery.  His scan shows no evidence of recurrence.  He is unfortunately back to smoking about 10 cigarettes/day.  He understands this increases his risk of a recurrence or a new lung cancer.  He will work on cutting back and hopefully quitting again.  He is on the patch now.    From a lung cancer standpoint he understands that his risk of recurrence is small probably less than 5% at this time.  We still do recommend once yearly surveillance imaging at least until he is 5 years out.  If that scan looks good then he is technically cured of his initial lung cancer.  After that due to his history of smoking along lung cancer we do recommend ongoing yearly surveillance imaging at least once a year.  He would like to do that closer to home.  We will arrange that through his primary after his scan next year.  Voiced understanding appreciation.  He will call us with any worsening shortness of breath, hemoptysis, unexplained weight loss or persistent infection.    Migule Lohn      Orders:    CT chest wo contrast; Future

## 2025-05-29 ENCOUNTER — NURSE TRIAGE (OUTPATIENT)
Dept: OTHER | Facility: OTHER | Age: 55
End: 2025-05-29

## 2025-05-30 ENCOUNTER — ANESTHESIA (OUTPATIENT)
Dept: PERIOP | Facility: HOSPITAL | Age: 55
End: 2025-05-30
Payer: COMMERCIAL

## 2025-05-30 ENCOUNTER — HOSPITAL ENCOUNTER (OUTPATIENT)
Dept: PERIOP | Facility: HOSPITAL | Age: 55
Setting detail: OUTPATIENT SURGERY
Discharge: HOME | End: 2025-05-30
Attending: STUDENT IN AN ORGANIZED HEALTH CARE EDUCATION/TRAINING PROGRAM
Payer: COMMERCIAL

## 2025-05-30 ENCOUNTER — ANESTHESIA EVENT (OUTPATIENT)
Dept: PERIOP | Facility: HOSPITAL | Age: 55
End: 2025-05-30
Payer: COMMERCIAL

## 2025-05-30 VITALS
TEMPERATURE: 97.9 F | WEIGHT: 169 LBS | HEIGHT: 72 IN | DIASTOLIC BLOOD PRESSURE: 81 MMHG | SYSTOLIC BLOOD PRESSURE: 142 MMHG | BODY MASS INDEX: 22.89 KG/M2 | RESPIRATION RATE: 18 BRPM | HEART RATE: 65 BPM | OXYGEN SATURATION: 99 %

## 2025-05-30 DIAGNOSIS — Z12.11 SCREENING FOR COLON CANCER: ICD-10-CM

## 2025-05-30 PROCEDURE — 88305 TISSUE EXAM BY PATHOLOGIST: CPT | Performed by: PATHOLOGY

## 2025-05-30 PROCEDURE — 45385 COLONOSCOPY W/LESION REMOVAL: CPT | Performed by: STUDENT IN AN ORGANIZED HEALTH CARE EDUCATION/TRAINING PROGRAM

## 2025-05-30 RX ORDER — SODIUM CHLORIDE, SODIUM LACTATE, POTASSIUM CHLORIDE, CALCIUM CHLORIDE 600; 310; 30; 20 MG/100ML; MG/100ML; MG/100ML; MG/100ML
INJECTION, SOLUTION INTRAVENOUS CONTINUOUS PRN
Status: DISCONTINUED | OUTPATIENT
Start: 2025-05-30 | End: 2025-05-30

## 2025-05-30 RX ORDER — LIDOCAINE HYDROCHLORIDE 10 MG/ML
INJECTION, SOLUTION EPIDURAL; INFILTRATION; INTRACAUDAL; PERINEURAL AS NEEDED
Status: DISCONTINUED | OUTPATIENT
Start: 2025-05-30 | End: 2025-05-30

## 2025-05-30 RX ORDER — PROPOFOL 10 MG/ML
INJECTION, EMULSION INTRAVENOUS AS NEEDED
Status: DISCONTINUED | OUTPATIENT
Start: 2025-05-30 | End: 2025-05-30

## 2025-05-30 RX ADMIN — PROPOFOL 120 MCG/KG/MIN: 10 INJECTION, EMULSION INTRAVENOUS at 09:04

## 2025-05-30 RX ADMIN — PROPOFOL 20 MG: 10 INJECTION, EMULSION INTRAVENOUS at 08:53

## 2025-05-30 RX ADMIN — LIDOCAINE HYDROCHLORIDE 50 MG: 10 INJECTION, SOLUTION EPIDURAL; INFILTRATION; INTRACAUDAL; PERINEURAL at 08:50

## 2025-05-30 RX ADMIN — PROPOFOL 30 MG: 10 INJECTION, EMULSION INTRAVENOUS at 08:58

## 2025-05-30 RX ADMIN — PROPOFOL 20 MG: 10 INJECTION, EMULSION INTRAVENOUS at 08:55

## 2025-05-30 RX ADMIN — PROPOFOL 80 MG: 10 INJECTION, EMULSION INTRAVENOUS at 09:03

## 2025-05-30 RX ADMIN — PROPOFOL 130 MG: 10 INJECTION, EMULSION INTRAVENOUS at 08:50

## 2025-05-30 RX ADMIN — SODIUM CHLORIDE, SODIUM LACTATE, POTASSIUM CHLORIDE, AND CALCIUM CHLORIDE: .6; .31; .03; .02 INJECTION, SOLUTION INTRAVENOUS at 08:20

## 2025-05-30 NOTE — TELEPHONE ENCOUNTER
"REASON FOR CONVERSATION: Colonoscopy    SYMPTOMS: Questions about colonoscopy prep    OTHER HEALTH INFORMATION: Test is tomorrow at 9:15    PROTOCOL DISPOSITION: Home Care    CARE ADVICE PROVIDED: Advised to take nothing by mouth except the prep after midnight.    PRACTICE FOLLOW-UP: None needed      Reason for Disposition   Bowel prep for colonoscopy, questions about    Answer Assessment - Initial Assessment Questions  1. DATE/TIME: \"When did you have your colonoscopy?\"       Tomorrow morning     2. MAIN CONCERN: \"What is your main concern right now?\" \"What questions do you have?\"      Inquiring if he is supposed to drink his prep at 3 am since he was told nothing after midnight. Advised that the second half of the prep should be taken 5 hours before the test and nothing by mouth EXCEPT the prep. Patient verbalizes understanding.    Protocols used: Colonoscopy Symptoms and Questions-Adult-    "
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
## 2025-05-30 NOTE — ANESTHESIA POSTPROCEDURE EVALUATION
Post-Op Assessment Note    CV Status:  Stable  Pain Score: 0    Pain management: adequate       Mental Status:  Alert and awake   Hydration Status:  Euvolemic   PONV Controlled:  Controlled   Airway Patency:  Patent     Post Op Vitals Reviewed: Yes    No anethesia notable event occurred.    Staff: CRNA           Last Filed PACU Vitals:  Vitals Value Taken Time   Temp 98.5    Pulse 61 05/30/25 09:16   /74    Resp 18 05/30/25 09:16   SpO2 98 % 05/30/25 09:16   Vitals shown include unfiled device data.

## 2025-05-30 NOTE — ANESTHESIA PREPROCEDURE EVALUATION
Procedure:  COLONOSCOPY    Every day smoker 47 py  Hx of Right sided lung cancer s/p RUL lobectomy    Relevant Problems   CARDIO   (+) Coronary artery disease of native heart with stable angina pectoris, unspecified vessel or lesion type (HCC)   (+) Hyperlipidemia   (+) Hypertension   (+) Mild pulmonary hypertension (HCC)      GI/HEPATIC   (+) Gastroesophageal reflux disease      MUSCULOSKELETAL   (+) Thoracic back pain      NEURO/PSYCH   (+) Major depressive disorder, recurrent episode, moderate (HCC)      PULMONARY   (+) Centrilobular emphysema (HCC)      CT chest 4/1/25  IMPRESSION:     No recurrent or metastatic disease in the chest.     Physical Exam    Airway     Mallampati score: II  TM Distance: >3 FB  Neck ROM: full      Cardiovascular      Dental        Pulmonary      Neurological    He appears awake, alert and oriented x3.      Other Findings      Anesthesia Plan  ASA Score- 3     Anesthesia Type- IV sedation with anesthesia with ASA Monitors.         Additional Monitors:     Airway Plan:     Comment: Recent labs personally reviewed:  Lab Results       Component                Value               Date                       WBC                      7.76                03/15/2024                 HGB                      14.8                03/15/2024                 PLT                      362                 03/15/2024            Lab Results       Component                Value               Date                       NA                       135 (L)             09/23/2015                 K                        3.9                 03/15/2024                 BUN                      15                  03/15/2024                 CREATININE               0.89                03/15/2024                 GLUCOSE                  113                 09/23/2015            Lab Results       Component                Value               Date                       PTT                      23                   12/28/2022             Lab Results       Component                Value               Date                       INR                      0.9                 05/10/2023              Blood type A    Patient was consented for sedation with IV anesthetic. Discussed that we will maintain spontaneous respirations and utilize supplemental O2. I discussed the risks of aspiration, hypoxia, laryngospasm and bronchospasm. I discussed the scenarios related to conversion to general anesthetic. All questions answered.     I, Desi Pennington MD, have personally seen and evaluated the patient prior to anesthetic care.  I have reviewed the pre-anesthetic record, medical history, allergies, medications and any other medical records if appropriate to the anesthetic care.  If a CRNA is involved in the case, I have reviewed the CRNA assessment, if present, and agree. Patient consented for IV Sedation, general anesthesia as back up. Discussed risks of aspiration, IV infiltration, indications for conversion to general anesthesia. All questions and concerns addressed.   .       Plan Factors-Exercise tolerance (METS): >4 METS.    Chart reviewed. EKG reviewed. Imaging results reviewed. Existing labs reviewed. Patient summary reviewed.    Patient is a current smoker.  Patient instructed to abstain from smoking on day of procedure. Patient did not smoke on day of surgery.    Obstructive sleep apnea risk education given perioperatively.        Induction-     Postoperative Plan- .   Monitoring Plan - Monitoring plan - standard ASA monitoring          Informed Consent- Anesthetic plan and risks discussed with patient.  I personally reviewed this patient with the CRNA. Discussed and agreed on the Anesthesia Plan with the CRNA..      NPO Status:  No vitals data found for the desired time range.

## 2025-06-04 PROCEDURE — 88305 TISSUE EXAM BY PATHOLOGIST: CPT | Performed by: PATHOLOGY

## 2025-06-05 ENCOUNTER — RESULTS FOLLOW-UP (OUTPATIENT)
Age: 55
End: 2025-06-05

## 2025-07-09 DIAGNOSIS — K21.9 GASTROESOPHAGEAL REFLUX DISEASE, UNSPECIFIED WHETHER ESOPHAGITIS PRESENT: ICD-10-CM

## 2025-07-10 RX ORDER — OMEPRAZOLE 40 MG/1
40 CAPSULE, DELAYED RELEASE ORAL DAILY
Qty: 90 CAPSULE | Refills: 1 | Status: SHIPPED | OUTPATIENT
Start: 2025-07-10

## (undated) DEVICE — ANTI-FOG SOLUTION WITH FOAM PAD: Brand: DEVON

## (undated) DEVICE — SYRINGE 20ML LL

## (undated) DEVICE — REDUCER: Brand: ENDOWRIST

## (undated) DEVICE — ACE WRAP 4 IN UNSTERILE

## (undated) DEVICE — GLOVE SRG BIOGEL 8

## (undated) DEVICE — LIGAMAX 5 MM ENDOSCOPIC MULTIPLE CLIP APPLIER: Brand: LIGAMAX

## (undated) DEVICE — CYTOLOGY BRUSH: Brand: CYTOLOGY BRUSH

## (undated) DEVICE — TRAY FOLEY 16FR URIMETER SILICONE SURESTEP

## (undated) DEVICE — INTENDED FOR TISSUE SEPARATION, AND OTHER PROCEDURES THAT REQUIRE A SHARP SURGICAL BLADE TO PUNCTURE OR CUT.: Brand: BARD-PARKER ® CARBON RIB-BACK BLADES

## (undated) DEVICE — SPLINT ORTHO-GLASS 3IN X 15FT

## (undated) DEVICE — SPECIMEN CONTAINER STERILE PEEL PACK

## (undated) DEVICE — GLOVE SRG BIOGEL 7.5

## (undated) DEVICE — SYRINGE 10ML LL

## (undated) DEVICE — SYRINGE 3ML LL

## (undated) DEVICE — TROCARS: Brand: KII® BALLOON BLUNT TIP SYSTEM

## (undated) DEVICE — ADHESIVE SKN CLSR HISTOACRYL FLEX 0.5ML LF

## (undated) DEVICE — SUT VICRYL 2-0 SH 27 IN UNDYED J417H

## (undated) DEVICE — CAST PADDING 4 IN SYNTHETIC NON-STRL

## (undated) DEVICE — X-RAY DETECTABLE SPONGES,16 PLY: Brand: VISTEC

## (undated) DEVICE — ARM DRAPE

## (undated) DEVICE — ENDOPATH XCEL UNIVERSAL TROCAR STABLILITY SLEEVES: Brand: ENDOPATH XCEL

## (undated) DEVICE — NEEDLE HYPO 22G X 1-1/2 IN

## (undated) DEVICE — STAPLER 30 RELOAD WHITE: Brand: ENDOWRIST

## (undated) DEVICE — INTENDED FOR TISSUE SEPARATION, AND OTHER PROCEDURES THAT REQUIRE A SHARP SURGICAL BLADE TO PUNCTURE OR CUT.: Brand: BARD-PARKER SAFETY BLADES SIZE 15, STERILE

## (undated) DEVICE — IV EXTENSION TUBING 33 IN

## (undated) DEVICE — 5 MM CURVED DISSECTORS WITH MONOPOLAR CAUTERY: Brand: ENDOPATH

## (undated) DEVICE — STAPLER 45 RELOAD BLUE: Brand: ENDOWRIST

## (undated) DEVICE — HARMONIC 1100 SHEARS, 36CM SHAFT LENGTH: Brand: HARMONIC

## (undated) DEVICE — GLOVE INDICATOR PI UNDERGLOVE SZ 7.5 BLUE

## (undated) DEVICE — SYRINGE 10ML LL CONTROL TOP

## (undated) DEVICE — [HIGH FLOW INSUFFLATOR,  DO NOT USE IF PACKAGE IS DAMAGED,  KEEP DRY,  KEEP AWAY FROM SUNLIGHT,  PROTECT FROM HEAT AND RADIOACTIVE SOURCES.]: Brand: PNEUMOSURE

## (undated) DEVICE — GAUZE SPONGES,16 PLY: Brand: CURITY

## (undated) DEVICE — 3M™ TEGADERM™ TRANSPARENT FILM DRESSING FRAME STYLE, 1624W, 2-3/8 IN X 2-3/4 IN (6 CM X 7 CM), 100/CT 4CT/CASE: Brand: 3M™ TEGADERM™

## (undated) DEVICE — NEEDLE TBNA PERIVIEW FLEX 21GA 20MM

## (undated) DEVICE — SUT VICRYL 0 UR-6 27 IN J603H

## (undated) DEVICE — SYRINGE 10ML SLIP TIP LF

## (undated) DEVICE — 2.7MM VA LCKNG SCREW SLF-TPNG WITH T8 STARDRIVE RECESS 18MM: Type: IMPLANTABLE DEVICE | Site: ELBOW | Status: NON-FUNCTIONAL

## (undated) DEVICE — HEAVY DUTY TABLE COVER: Brand: CONVERTORS

## (undated) DEVICE — NEEDLE 25G X 1 1/2

## (undated) DEVICE — SINGLE USE BIOPSY VALVE MAJ-210: Brand: SINGLE USE BIOPSY VALVE (STERILE)

## (undated) DEVICE — NEURO PATTIES 1/2 X 1 1/2

## (undated) DEVICE — GLOVE SRG BIOGEL 7

## (undated) DEVICE — BRONCHOSCOPE PATIENT INTRODUCER KIT: Brand: BRONCHOSCOPE PATIENT INTRODUCER KIT

## (undated) DEVICE — ENDOPATH 5MM CURVED SCISSORS WITH MONOPOLAR CAUTERY: Brand: ENDOPATH

## (undated) DEVICE — CURVED TIP STAPLER 45: Brand: ENDOWRIST

## (undated) DEVICE — ADHESIVE SKIN HIGH VISCOSITY EXOFIN 1ML

## (undated) DEVICE — DRAPE C-ARM X-RAY

## (undated) DEVICE — TRANSPOSAL ULTRAFLEX DUO/QUAD ULTRA CART MANIFOLD

## (undated) DEVICE — NEEDLE SPINAL 20G X 3.5 LF

## (undated) DEVICE — Device: Brand: BALLOON

## (undated) DEVICE — BETHLEHEM TOTAL HIP, KIT: Brand: CARDINAL HEALTH

## (undated) DEVICE — ENDOPATH XCEL BLADELESS TROCARS WITH STABILITY SLEEVES: Brand: ENDOPATH XCEL

## (undated) DEVICE — SUT VICRYL 0 CT-1 36 IN J946H

## (undated) DEVICE — ADAPTOR TRACH SWIVEL

## (undated) DEVICE — SUT VICRYL 0 CT-1 18 IN J740D

## (undated) DEVICE — Device: Brand: TISSUE RETRIEVAL SYSTEM

## (undated) DEVICE — GAUZE ROLL KITTNER

## (undated) DEVICE — 1820 FOAM BLOCK NEEDLE COUNTER: Brand: DEVON

## (undated) DEVICE — FIRST STEP BEDSIDE KIT - STAND-UP POUCH, ENDOSCOPIC CLEANING PAD - 1 POUCH: Brand: FIRST STEP BEDSIDE KIT - STAND-UP POUCH, ENDOSCOPIC CLEANING PAD

## (undated) DEVICE — 2.5MM DRILL BIT/QC/GOLD/110MM

## (undated) DEVICE — SUT VICRYL 2-0 CT-2 27 IN J269H

## (undated) DEVICE — 10FR FRAZIER SUCTION HANDLE: Brand: CARDINAL HEALTH

## (undated) DEVICE — SUT MONOCRYL 4-0 PS-2 27 IN Y426H

## (undated) DEVICE — SEAL

## (undated) DEVICE — INTENDED FOR TISSUE SEPARATION, AND OTHER PROCEDURES THAT REQUIRE A SHARP SURGICAL BLADE TO PUNCTURE OR CUT.: Brand: BARD-PARKER SAFETY BLADES SIZE 11, STERILE

## (undated) DEVICE — PAD CAST 4 IN COTTON NON STERILE

## (undated) DEVICE — MAYO STAND COVER: Brand: CONVERTORS

## (undated) DEVICE — BETHLEHEM UNIVERSAL OUTPATIENT: Brand: CARDINAL HEALTH

## (undated) DEVICE — PAD GROUNDING ADULT

## (undated) DEVICE — STAPLER 30 RELOAD: Brand: ENDOWRIST

## (undated) DEVICE — 3M™ STERI-DRAPE™ U-DRAPE 1015: Brand: STERI-DRAPE™

## (undated) DEVICE — STAPLER 45 RELOAD: Brand: ENDOWRIST

## (undated) DEVICE — BRONCHOSCOPE MONARCH SNGL USE

## (undated) DEVICE — KERLIX BANDAGE ROLL: Brand: KERLIX

## (undated) DEVICE — CADIERE FORCEPS: Brand: ENDOWRIST

## (undated) DEVICE — SCD SEQUENTIAL COMPRESSION COMFORT SLEEVE MEDIUM KNEE LENGTH: Brand: KENDALL SCD

## (undated) DEVICE — COBAN 6 IN STERILE

## (undated) DEVICE — TUBING SUCTION 5MM X 12 FT

## (undated) DEVICE — SUT VICRYL 3-0 SH 27 IN J416H

## (undated) DEVICE — SINGLE USE SUCTION VALVE MAJ-209: Brand: SINGLE USE SUCTION VALVE (STERILE)

## (undated) DEVICE — 1.6MM KIRSCHNER WIRE W/TROCAR POINT 150MM
Type: IMPLANTABLE DEVICE | Site: ELBOW | Status: NON-FUNCTIONAL
Removed: 2023-01-11

## (undated) DEVICE — SKIN MARKER DUAL TIP WITH RULER CAP, FLEXIBLE RULER AND LABELS: Brand: DEVON

## (undated) DEVICE — TELFA NON-ADHERENT ABSORBENT DRESSING: Brand: TELFA

## (undated) DEVICE — BRONCHOSCOPE SHEATH VALVE: Brand: BRONCHOSCOPE SHEATH VALVE

## (undated) DEVICE — GENERAL ENDOSCOPY PACK: Brand: CONVERTORS

## (undated) DEVICE — LUBRICANT INST ELECTROLUBE ANTISTK WO PAD

## (undated) DEVICE — IMPERVIOUS STOCKINETTE: Brand: DEROYAL

## (undated) DEVICE — NEEDLE 18 G X 1 1/2 SAFETY

## (undated) DEVICE — GLOVE INDICATOR PI UNDERGLOVE SZ 8 BLUE

## (undated) DEVICE — IRRIG ENDO FLO TUBING

## (undated) DEVICE — SUT PROLENE 0 CT-1 30 IN 8424H

## (undated) DEVICE — CHLORAPREP HI-LITE 26ML ORANGE

## (undated) DEVICE — SINGLE TUBING WITH LARGE CONNECTOR FOR THORACIC SUCTION SYSTEM PUMP: Brand: THOPAZ TUBING SINGLE

## (undated) DEVICE — STAPLER 45: Brand: ENDOWRIST

## (undated) DEVICE — 2.0MM DRILL BIT WITH DEPTH MARK/QC/140MM

## (undated) DEVICE — MARYLAND BIPOLAR FORCEPS: Brand: ENDOWRIST

## (undated) DEVICE — STAPLER 45 RELOAD WHITE: Brand: ENDOWRIST

## (undated) DEVICE — GLOVE SRG BIOGEL ECLIPSE 7.5

## (undated) DEVICE — 3000CC GUARDIAN II: Brand: GUARDIAN

## (undated) DEVICE — UTILITY MARKER,BLACK WITH LABELS: Brand: DEVON

## (undated) DEVICE — CANNULA SEAL

## (undated) DEVICE — STAPLER SHEATH: Brand: ENDOWRIST

## (undated) DEVICE — TROCARS: Brand: KII® OPTICAL ACCESS SYSTEM

## (undated) DEVICE — SURGICEL 4 X 8

## (undated) DEVICE — ENDOPATH 5 MM GRASPERS WITH RATCHET HANDLES: Brand: ENDOPATH

## (undated) DEVICE — DRAPE SHEET THREE QUARTER

## (undated) DEVICE — 2000CC GUARDIAN II: Brand: GUARDIAN

## (undated) DEVICE — STERILIZATION TEETH GUARDS

## (undated) DEVICE — STOPCOCK 3-WAY

## (undated) DEVICE — GAUZE SPONGES,8 PLY: Brand: CURITY

## (undated) DEVICE — SPONGE 4 X 4 XRAY 16 PLY STRL LF RFD

## (undated) DEVICE — NAVIGATION PATIENT PATCHES (QUANTITY OF 60): Brand: NAVIGATION PATIENT PATCHES

## (undated) DEVICE — PADDING CAST 3IN COTTON STRL

## (undated) DEVICE — 28 FR STRAIGHT – SOFT PVC CATHETER: Brand: PVC THORACIC CATHETERS

## (undated) DEVICE — CURVED-TIP STAPLER 30: Brand: ENDOWRIST

## (undated) DEVICE — OCCLUSIVE GAUZE STRIP,3% BISMUTH TRIBROMOPHENATE IN PETROLATUM BLEND: Brand: XEROFORM

## (undated) DEVICE — COLUMN DRAPE

## (undated) DEVICE — KIT, BETHLEHEM THORACIC ROBOT: Brand: CARDINAL HEALTH

## (undated) DEVICE — PLUMEPEN PRO 10FT

## (undated) DEVICE — TIP-UP FENESTRATED GRASPER: Brand: ENDOWRIST

## (undated) DEVICE — BRONCHOSCOPE FLUIDICS TUBING: Brand: BRONCHOSCOPE FLUIDICS TUBING

## (undated) DEVICE — VESSEL LOOPS X-RAY DETECTABLE: Brand: DEROYAL

## (undated) DEVICE — TAUT CATH INTRODUCER 4.5 FR

## (undated) DEVICE — SPONGE LAP 18 X 18 IN STRL RFD

## (undated) DEVICE — VISUALIZATION SYSTEM: Brand: CLEARIFY

## (undated) DEVICE — ENDOPOUCH RETRIEVER SPECIMEN RETRIEVAL BAGS: Brand: ENDOPOUCH RETRIEVER